# Patient Record
Sex: MALE | Race: AMERICAN INDIAN OR ALASKA NATIVE | NOT HISPANIC OR LATINO | Employment: UNEMPLOYED | URBAN - METROPOLITAN AREA
[De-identification: names, ages, dates, MRNs, and addresses within clinical notes are randomized per-mention and may not be internally consistent; named-entity substitution may affect disease eponyms.]

---

## 2017-10-06 ENCOUNTER — HOSPITAL ENCOUNTER (OUTPATIENT)
Facility: MEDICAL CENTER | Age: 47
End: 2017-10-06
Attending: FAMILY MEDICINE
Payer: COMMERCIAL

## 2017-10-06 LAB
ALBUMIN SERPL BCP-MCNC: 3.9 G/DL (ref 3.2–4.9)
ALBUMIN/GLOB SERPL: 1.2 G/DL
ALP SERPL-CCNC: 48 U/L (ref 30–99)
ALT SERPL-CCNC: 59 U/L (ref 2–50)
ANION GAP SERPL CALC-SCNC: 8 MMOL/L (ref 0–11.9)
AST SERPL-CCNC: 35 U/L (ref 12–45)
BASOPHILS # BLD AUTO: 0.7 % (ref 0–1.8)
BASOPHILS # BLD: 0.03 K/UL (ref 0–0.12)
BILIRUB SERPL-MCNC: 1.4 MG/DL (ref 0.1–1.5)
BUN SERPL-MCNC: 15 MG/DL (ref 8–22)
CALCIUM SERPL-MCNC: 9.4 MG/DL (ref 8.5–10.5)
CHLORIDE SERPL-SCNC: 103 MMOL/L (ref 96–112)
CHOLEST SERPL-MCNC: 141 MG/DL (ref 100–199)
CO2 SERPL-SCNC: 23 MMOL/L (ref 20–33)
CREAT SERPL-MCNC: 0.87 MG/DL (ref 0.5–1.4)
CREAT UR-MCNC: 222 MG/DL
EOSINOPHIL # BLD AUTO: 0.15 K/UL (ref 0–0.51)
EOSINOPHIL NFR BLD: 3.3 % (ref 0–6.9)
ERYTHROCYTE [DISTWIDTH] IN BLOOD BY AUTOMATED COUNT: 42.1 FL (ref 35.9–50)
EST. AVERAGE GLUCOSE BLD GHB EST-MCNC: 289 MG/DL
GFR SERPL CREATININE-BSD FRML MDRD: >60 ML/MIN/1.73 M 2
GLOBULIN SER CALC-MCNC: 3.3 G/DL (ref 1.9–3.5)
GLUCOSE SERPL-MCNC: 276 MG/DL (ref 65–99)
HBA1C MFR BLD: 11.7 % (ref 0–5.6)
HCT VFR BLD AUTO: 49.5 % (ref 42–52)
HDLC SERPL-MCNC: 34 MG/DL
HGB BLD-MCNC: 17 G/DL (ref 14–18)
IMM GRANULOCYTES # BLD AUTO: 0.01 K/UL (ref 0–0.11)
IMM GRANULOCYTES NFR BLD AUTO: 0.2 % (ref 0–0.9)
LDLC SERPL CALC-MCNC: 59 MG/DL
LYMPHOCYTES # BLD AUTO: 2.16 K/UL (ref 1–4.8)
LYMPHOCYTES NFR BLD: 47 % (ref 22–41)
MCH RBC QN AUTO: 31.5 PG (ref 27–33)
MCHC RBC AUTO-ENTMCNC: 34.3 G/DL (ref 33.7–35.3)
MCV RBC AUTO: 91.8 FL (ref 81.4–97.8)
MICROALBUMIN UR-MCNC: 2.7 MG/DL
MICROALBUMIN/CREAT UR: 12 MG/G (ref 0–30)
MONOCYTES # BLD AUTO: 0.33 K/UL (ref 0–0.85)
MONOCYTES NFR BLD AUTO: 7.2 % (ref 0–13.4)
NEUTROPHILS # BLD AUTO: 1.92 K/UL (ref 1.82–7.42)
NEUTROPHILS NFR BLD: 41.6 % (ref 44–72)
NRBC # BLD AUTO: 0 K/UL
NRBC BLD AUTO-RTO: 0 /100 WBC
PLATELET # BLD AUTO: 264 K/UL (ref 164–446)
PMV BLD AUTO: 11.4 FL (ref 9–12.9)
POTASSIUM SERPL-SCNC: 4.2 MMOL/L (ref 3.6–5.5)
PROT SERPL-MCNC: 7.2 G/DL (ref 6–8.2)
RBC # BLD AUTO: 5.39 M/UL (ref 4.7–6.1)
SODIUM SERPL-SCNC: 134 MMOL/L (ref 135–145)
TRIGL SERPL-MCNC: 242 MG/DL (ref 0–149)
WBC # BLD AUTO: 4.6 K/UL (ref 4.8–10.8)

## 2017-10-06 PROCEDURE — 82043 UR ALBUMIN QUANTITATIVE: CPT

## 2017-10-06 PROCEDURE — 80053 COMPREHEN METABOLIC PANEL: CPT

## 2017-10-06 PROCEDURE — 82570 ASSAY OF URINE CREATININE: CPT

## 2017-10-06 PROCEDURE — 83036 HEMOGLOBIN GLYCOSYLATED A1C: CPT

## 2017-10-06 PROCEDURE — 80061 LIPID PANEL: CPT

## 2017-10-06 PROCEDURE — 85025 COMPLETE CBC W/AUTO DIFF WBC: CPT

## 2018-01-29 ENCOUNTER — HOSPITAL ENCOUNTER (OUTPATIENT)
Dept: LAB | Facility: MEDICAL CENTER | Age: 48
End: 2018-01-29
Attending: FAMILY MEDICINE
Payer: COMMERCIAL

## 2018-01-29 LAB
ALBUMIN SERPL BCP-MCNC: 4.1 G/DL (ref 3.2–4.9)
ALBUMIN/GLOB SERPL: 1.6 G/DL
ALP SERPL-CCNC: 42 U/L (ref 30–99)
ALT SERPL-CCNC: 34 U/L (ref 2–50)
ANION GAP SERPL CALC-SCNC: 9 MMOL/L (ref 0–11.9)
AST SERPL-CCNC: 20 U/L (ref 12–45)
BASOPHILS # BLD AUTO: 0.5 % (ref 0–1.8)
BASOPHILS # BLD: 0.02 K/UL (ref 0–0.12)
BILIRUB SERPL-MCNC: 0.5 MG/DL (ref 0.1–1.5)
BUN SERPL-MCNC: 20 MG/DL (ref 8–22)
CALCIUM SERPL-MCNC: 10 MG/DL (ref 8.5–10.5)
CHLORIDE SERPL-SCNC: 99 MMOL/L (ref 96–112)
CO2 SERPL-SCNC: 25 MMOL/L (ref 20–33)
CREAT SERPL-MCNC: 0.85 MG/DL (ref 0.5–1.4)
EOSINOPHIL # BLD AUTO: 0.12 K/UL (ref 0–0.51)
EOSINOPHIL NFR BLD: 2.8 % (ref 0–6.9)
ERYTHROCYTE [DISTWIDTH] IN BLOOD BY AUTOMATED COUNT: 43.4 FL (ref 35.9–50)
GLOBULIN SER CALC-MCNC: 2.6 G/DL (ref 1.9–3.5)
GLUCOSE SERPL-MCNC: 338 MG/DL (ref 65–99)
HCT VFR BLD AUTO: 48.7 % (ref 42–52)
HGB BLD-MCNC: 16.7 G/DL (ref 14–18)
IMM GRANULOCYTES # BLD AUTO: 0.01 K/UL (ref 0–0.11)
IMM GRANULOCYTES NFR BLD AUTO: 0.2 % (ref 0–0.9)
LYMPHOCYTES # BLD AUTO: 2.18 K/UL (ref 1–4.8)
LYMPHOCYTES NFR BLD: 50.8 % (ref 22–41)
MCH RBC QN AUTO: 32.4 PG (ref 27–33)
MCHC RBC AUTO-ENTMCNC: 34.3 G/DL (ref 33.7–35.3)
MCV RBC AUTO: 94.4 FL (ref 81.4–97.8)
MONOCYTES # BLD AUTO: 0.31 K/UL (ref 0–0.85)
MONOCYTES NFR BLD AUTO: 7.2 % (ref 0–13.4)
NEUTROPHILS # BLD AUTO: 1.65 K/UL (ref 1.82–7.42)
NEUTROPHILS NFR BLD: 38.5 % (ref 44–72)
NRBC # BLD AUTO: 0 K/UL
NRBC BLD-RTO: 0 /100 WBC
PLATELET # BLD AUTO: 232 K/UL (ref 164–446)
PMV BLD AUTO: 11.4 FL (ref 9–12.9)
POTASSIUM SERPL-SCNC: 4.7 MMOL/L (ref 3.6–5.5)
PROT SERPL-MCNC: 6.7 G/DL (ref 6–8.2)
RBC # BLD AUTO: 5.16 M/UL (ref 4.7–6.1)
SODIUM SERPL-SCNC: 133 MMOL/L (ref 135–145)
WBC # BLD AUTO: 4.3 K/UL (ref 4.8–10.8)

## 2018-01-29 PROCEDURE — 83525 ASSAY OF INSULIN: CPT

## 2018-01-29 PROCEDURE — 83036 HEMOGLOBIN GLYCOSYLATED A1C: CPT

## 2018-01-29 PROCEDURE — 80053 COMPREHEN METABOLIC PANEL: CPT

## 2018-01-29 PROCEDURE — 85025 COMPLETE CBC W/AUTO DIFF WBC: CPT

## 2018-01-29 PROCEDURE — 84681 ASSAY OF C-PEPTIDE: CPT

## 2018-01-30 LAB
EST. AVERAGE GLUCOSE BLD GHB EST-MCNC: 332 MG/DL
HBA1C MFR BLD: 13.2 % (ref 0–5.6)

## 2018-01-31 LAB
C PEPTIDE SERPL-MCNC: 2.5 NG/ML (ref 0.8–3.5)
INSULIN P FAST SERPL-ACNC: 9 UIU/ML (ref 3–19)

## 2018-04-05 ENCOUNTER — PATIENT OUTREACH (OUTPATIENT)
Dept: HEALTH INFORMATION MANAGEMENT | Facility: OTHER | Age: 48
End: 2018-04-05

## 2018-04-05 NOTE — PROGRESS NOTES
Attempt #:FINAL    WebIZ Checked & Epic Updated: no  HealthConnect Verified: no  Verify PCP: yes    Communication Preference Obtained: yes     Review Care Team: yes         Care Gap Scheduling (Attempt to Schedule EACH Overdue Care Gap!)  Health Maintenance Due   Topic Date Due   • IMM DTaP/Tdap/Td Vaccine (1 - Tdap) 08/10/1991           MyChart Activation: sent activation code

## 2018-05-29 ENCOUNTER — TELEPHONE (OUTPATIENT)
Dept: MEDICAL GROUP | Facility: LAB | Age: 48
End: 2018-05-29

## 2018-05-29 DIAGNOSIS — M25.569 KNEE PAIN, UNSPECIFIED CHRONICITY, UNSPECIFIED LATERALITY: ICD-10-CM

## 2018-05-29 DIAGNOSIS — M25.519 SHOULDER PAIN, UNSPECIFIED CHRONICITY, UNSPECIFIED LATERALITY: ICD-10-CM

## 2018-05-29 NOTE — TELEPHONE ENCOUNTER
1. Caller Name: Myron                                       Call Back Number: [phone]      Patient approves a detailed voicemail message: yes    Pt called states he was told you will be his new PCP since the county changed his insurance. They no longer accept his old PCP. Pt is requiring a referral to ortho for shoulder and knee pain. He stated he is in [place] til the end of July and Ortho can get him in next week. Shoulder has popping and sharp pain with light weight. Knee pain is caused due to flat feet. Pt states she wears inserts in his shoes.   [facility] Attn- Alejandra Bloom Fax [phone]  Please advise

## 2018-07-03 ENCOUNTER — OFFICE VISIT (OUTPATIENT)
Dept: MEDICAL GROUP | Facility: MEDICAL CENTER | Age: 48
End: 2018-07-03
Payer: COMMERCIAL

## 2018-07-03 VITALS
RESPIRATION RATE: 16 BRPM | OXYGEN SATURATION: 95 % | HEART RATE: 68 BPM | DIASTOLIC BLOOD PRESSURE: 90 MMHG | BODY MASS INDEX: 32.28 KG/M2 | SYSTOLIC BLOOD PRESSURE: 122 MMHG | HEIGHT: 71 IN | TEMPERATURE: 97.4 F | WEIGHT: 230.6 LBS

## 2018-07-03 DIAGNOSIS — I10 ESSENTIAL HYPERTENSION: ICD-10-CM

## 2018-07-03 DIAGNOSIS — Z76.89 ESTABLISHING CARE WITH NEW DOCTOR, ENCOUNTER FOR: ICD-10-CM

## 2018-07-03 DIAGNOSIS — G89.29 CHRONIC RIGHT SHOULDER PAIN: ICD-10-CM

## 2018-07-03 DIAGNOSIS — M25.562 KNEE PAIN, LEFT ANTERIOR: ICD-10-CM

## 2018-07-03 DIAGNOSIS — E78.00 PURE HYPERCHOLESTEROLEMIA: ICD-10-CM

## 2018-07-03 DIAGNOSIS — E11.9 CONTROLLED TYPE 2 DIABETES MELLITUS WITHOUT COMPLICATION, WITHOUT LONG-TERM CURRENT USE OF INSULIN (HCC): ICD-10-CM

## 2018-07-03 DIAGNOSIS — M25.511 CHRONIC RIGHT SHOULDER PAIN: ICD-10-CM

## 2018-07-03 DIAGNOSIS — E66.9 OBESITY (BMI 30-39.9): ICD-10-CM

## 2018-07-03 PROCEDURE — 99202 OFFICE O/P NEW SF 15 MIN: CPT | Performed by: INTERNAL MEDICINE

## 2018-07-03 RX ORDER — LOVASTATIN 20 MG/1
TABLET ORAL
COMMUNITY
End: 2020-01-13

## 2018-07-03 RX ORDER — GLIPIZIDE 10 MG/1
TABLET ORAL
COMMUNITY
End: 2018-07-03

## 2018-07-03 RX ORDER — LOSARTAN POTASSIUM AND HYDROCHLOROTHIAZIDE 25; 100 MG/1; MG/1
TABLET ORAL
COMMUNITY
End: 2020-01-13

## 2018-07-03 RX ORDER — SAXAGLIPTIN 5 MG/1
TABLET, FILM COATED ORAL DAILY
COMMUNITY
End: 2018-07-03

## 2018-07-03 RX ORDER — AMLODIPINE AND BENAZEPRIL HYDROCHLORIDE 10; 40 MG/1; MG/1
CAPSULE ORAL
COMMUNITY
End: 2018-07-03

## 2018-07-03 RX ORDER — GLIPIZIDE 10 MG/1
10 TABLET ORAL 2 TIMES DAILY
COMMUNITY
End: 2018-10-09

## 2018-07-03 ASSESSMENT — PATIENT HEALTH QUESTIONNAIRE - PHQ9: CLINICAL INTERPRETATION OF PHQ2 SCORE: 0

## 2018-07-03 NOTE — LETTER
July 3, 2018        Myron Lilly        To Whom It May Concern:    I evaluated Myron in my office today, he is going to be worked up for possible torn rotator cuff and for right knee pain. He will be unable to perform his usual work duties for an indeterminate amount of time, depending on the outcome of his evaluation. We will have more information following his MRIs and orthopedic consultation.            Sincerely,      Nayeli Adam MD

## 2018-07-03 NOTE — PROGRESS NOTES
Chief Complaint   Patient presents with   • Establish Care   • Shoulder Pain     bilat, more pain with R side x 1 year, need referral to ortho   • Knee Pain     L       HISTORY OF PRESENT ILLNESS: Patient is a 47 y.o. male patient who presents today to discuss the evaluation and management of:          1. Establishing care with new doctor, encounter for    Previously followed by Dr. Fall, not seen for about 6 months. 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     2. Chronic right shoulder pain    Patient is complaining of increasing right shoulder pain over the last several years, now it is at the point that he is unable to use his right arm. He has very limited range of motion and has difficulty even  putting it on the steering wheel to drive.  He was seen by an orthopedist, and was to have an MRI but his insurance changed so it was not done.    3. Knee pain, left anterior    Patient is complaining of knee pain, he was told by his previous fourth toe that it was bone on bone. Again, he was to have an MRI but it was not done. He is unable to do any sort of weightbearing exercise due to pain'      4. Controlled type 2 diabetes mellitus without complication, without long-term current use of insulin (HCC)    Diagnosed about one year ago, she is on multiple medications including glipizide 10 mg twice a day, metformin 1000 mg twice a day, Januvia, and Jardiance.. He is due for fasting blood work. He does check his finger sticks occasionally, when last checked it was 111.    5. Essential hypertension    Currently taking losartan/hydrochlorothiazide 100/25 daily. Diastolic pressure is minimally elevated at 90 today.    6. Pure hypercholesterolemia    On Mevacor 20 mg daily.    7. Obesity (BMI 30-39.9)    Patient has gained at least 10 pounds recently due to his inability to exercise from his knee and shoulder pain. He is unable to swim or do any weightbearing  "exercises.        Patient Active Problem List    Diagnosis Date Noted   • Elevated transaminase level 02/22/2015     Priority: High   • Chronic right shoulder pain 07/03/2018   • Knee pain, left anterior 07/03/2018   • Controlled type 2 diabetes mellitus without complication, without long-term current use of insulin (HCC) 07/03/2018   • Essential hypertension 07/03/2018   • Pure hypercholesterolemia 07/03/2018   • Obesity (BMI 30-39.9) 07/03/2018        Allergies:Patient has no known allergies.    Current meds including changes today  Current Outpatient Prescriptions   Medication Sig Dispense Refill   • SITagliptin (JANUVIA) 100 MG Tab Januvia 100 mg tablet     • Empagliflozin (JARDIANCE) 25 MG Tab Jardiance 25 mg tablet     • losartan-hydrochlorothiazide (HYZAAR) 100-25 MG per tablet losartan 100 mg-hydrochlorothiazide 25 mg tablet     • lovastatin (MEVACOR) 20 MG Tab lovastatin 20 mg tablet     • metformin (GLUCOPHAGE) 1000 MG tablet metformin 1,000 mg tablet     • Empagliflozin 25 MG Tab Take 25 mg by mouth every day.     • glipiZIDE (GLUCOTROL) 10 MG Tab Take 10 mg by mouth 2 times a day.       No current facility-administered medications for this visit.      Social History   Substance Use Topics   • Smoking status: Never Smoker   • Smokeless tobacco: Former User   • Alcohol use Yes      Comment: rare     Social History     Social History Narrative   • No narrative on file       Family History   Problem Relation Age of Onset   • Diabetes Mother    • Diabetes Father    • Hypertension Father    • Diabetes Brother        Review of Systems:  No chest pain, No shortness of breath, No dyspnea on exertion  Gastrointestinal ROS: No abdominal pain, No nausea, vomiting, diarrhea, or constipation        Exam:      Blood pressure 122/90, pulse 68, temperature 36.3 °C (97.4 °F), resp. rate 16, height 1.803 m (5' 11\"), weight 104.6 kg (230 lb 9.6 oz), SpO2 95 %.  General:  Well nourished, well developed male in NAD affect and " mood within normal limits  Head is grossly normal.  Neck: Supple without adenopathy  Pulmonary: Clear to ausculation.  Normal effort. No rales, rhonchi, or wheezing.  Cardiovascular: Regular rate and rhythm without murmur.   Extremities: no clubbing, cyanosis, or edema. Very limited range of motion right shoulder due to pain. Tenderness of biceps tendon insertion. Right knee unable to fully extend      Please note that this dictation was created using voice recognition software. I have made every reasonable attempt to correct obvious errors, but I expect that there are errors of grammar and possibly content that I did not discover before finalizing the note.    Assessment/Plan:  1. Establishing care with new doctor, encounter for        2. Chronic right shoulder pain      - MR-SHOULDER-W/O RIGHT; Future  - REFERRAL TO ORTHOPEDICS    3. Knee pain, left anterior      - MR-KNEE-W/O RIGHT; Future  - REFERRAL TO ORTHOPEDICS    4. Controlled type 2 diabetes mellitus without complication, without long-term current use of insulin (HCC)    -Stable, continue current medications  - HEMOGLOBIN A1C; Future    5. Essential hypertension    Stable, continue Hyzaar. Consider adding calcium channel blocker if BP remains high  - COMP METABOLIC PANEL; Future    6. Pure hypercholesterolemia    Stable, continue lovastatin  - LIPID PROFILE; Future    7. Obesity (BMI 30-39.9)      - Patient identified as having weight management issue.  Appropriate orders and counseling given.    Followup: Return in about 3 months (around 10/3/2018).

## 2018-07-16 ENCOUNTER — HOSPITAL ENCOUNTER (OUTPATIENT)
Dept: RADIOLOGY | Facility: MEDICAL CENTER | Age: 48
End: 2018-07-16
Attending: INTERNAL MEDICINE
Payer: COMMERCIAL

## 2018-07-16 DIAGNOSIS — G89.29 CHRONIC RIGHT SHOULDER PAIN: ICD-10-CM

## 2018-07-16 DIAGNOSIS — M25.562 KNEE PAIN, LEFT ANTERIOR: ICD-10-CM

## 2018-07-16 DIAGNOSIS — M25.511 CHRONIC RIGHT SHOULDER PAIN: ICD-10-CM

## 2018-07-16 PROCEDURE — 73221 MRI JOINT UPR EXTREM W/O DYE: CPT | Mod: RT

## 2018-07-16 PROCEDURE — 73721 MRI JNT OF LWR EXTRE W/O DYE: CPT | Mod: RT

## 2018-07-17 ENCOUNTER — TELEPHONE (OUTPATIENT)
Dept: MEDICAL GROUP | Facility: MEDICAL CENTER | Age: 48
End: 2018-07-17

## 2018-07-17 NOTE — TELEPHONE ENCOUNTER
Phone Number Called: [phone] (home)     Message: Left msg to return call.    Left Message for patient to call back: yes

## 2018-07-17 NOTE — TELEPHONE ENCOUNTER
----- Message from Nayeli Adam M.D. sent at 7/17/2018  2:50 PM PDT -----  Please advise pt his MRI of the shoulder and knee did not show any major tears, there were some minor abnormalities he can discuss with the orthopedic surgeon.   Nayeli Adam M.D. covering for Nayeli Adam M.D.

## 2018-10-02 ENCOUNTER — OFFICE VISIT (OUTPATIENT)
Dept: MEDICAL GROUP | Facility: MEDICAL CENTER | Age: 48
End: 2018-10-02
Payer: COMMERCIAL

## 2018-10-02 VITALS
HEIGHT: 71 IN | RESPIRATION RATE: 16 BRPM | HEART RATE: 84 BPM | DIASTOLIC BLOOD PRESSURE: 90 MMHG | BODY MASS INDEX: 33.46 KG/M2 | TEMPERATURE: 97.1 F | SYSTOLIC BLOOD PRESSURE: 140 MMHG | OXYGEN SATURATION: 95 % | WEIGHT: 239 LBS

## 2018-10-02 DIAGNOSIS — Z23 NEED FOR VACCINATION: ICD-10-CM

## 2018-10-02 DIAGNOSIS — M79.652 THIGH PAIN, MUSCULOSKELETAL, LEFT: ICD-10-CM

## 2018-10-02 PROCEDURE — 90686 IIV4 VACC NO PRSV 0.5 ML IM: CPT | Performed by: INTERNAL MEDICINE

## 2018-10-02 PROCEDURE — 90471 IMMUNIZATION ADMIN: CPT | Performed by: INTERNAL MEDICINE

## 2018-10-02 PROCEDURE — 99213 OFFICE O/P EST LOW 20 MIN: CPT | Mod: 25 | Performed by: INTERNAL MEDICINE

## 2018-10-02 NOTE — LETTER
October 2, 2018        Myron Sanches was seen in my office today for evaluation of left upper thigh pain and swelling.  It is my opinion that he has a partially torn or completely torn left quadriceps muscle.  He should be off of work until the results of his evaluation including MRI are complete.  This should be done on or before October 10, 2018.      Sincerely,          Nayeli Adam MD

## 2018-10-03 ENCOUNTER — PATIENT MESSAGE (OUTPATIENT)
Dept: MEDICAL GROUP | Facility: MEDICAL CENTER | Age: 48
End: 2018-10-03

## 2018-10-03 ENCOUNTER — HOSPITAL ENCOUNTER (OUTPATIENT)
Dept: RADIOLOGY | Facility: MEDICAL CENTER | Age: 48
End: 2018-10-03
Attending: INTERNAL MEDICINE
Payer: COMMERCIAL

## 2018-10-03 DIAGNOSIS — M79.652 THIGH PAIN, MUSCULOSKELETAL, LEFT: ICD-10-CM

## 2018-10-03 DIAGNOSIS — S76.112S QUADRICEPS TENDON RUPTURE, LEFT, SEQUELA: ICD-10-CM

## 2018-10-03 PROCEDURE — 73718 MRI LOWER EXTREMITY W/O DYE: CPT | Mod: LT

## 2018-10-03 NOTE — PROGRESS NOTES
Chief Complaint   Patient presents with   • Muscle Pain     L quad,possible torn 2.5 weeks, had problem 2 years ago     Chief complaint: Left upper thigh pain    HISTORY OF PRESENT ILLNESS: Patient is a 48 y.o. male patient who presents today to discuss the evaluation and management of:          1. Thigh pain, musculoskeletal, left    Patient is complaining of pain in his left upper thigh since September 14, 2018.  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  Since then he has had pain and weakness, he is unable to climb stairs, stand up from a sitting position, and has had a mass in his upper thigh.  He was seen by Workmen's Comp. physician that day and was told he likely had a torn quadriceps muscle/tendon, however that he needed to go to his PCP for imaging.    Patient states he had a very similar injury about 2-1/2 years ago when he was chasing a suspect.  He had a sudden severe pain and heard a large pop or crack.  He was unable to walk or run for about 6 weeks, he had severe swelling and pain similar to what he has now.  He never had imaging or treatment, and it gradually resolved.    2. Need for vaccination    Due for flu shot        Patient Active Problem List    Diagnosis Date Noted   • Elevated transaminase level 02/22/2015     Priority: High   • Thigh pain, musculoskeletal, left 10/02/2018   • Chronic right shoulder pain 07/03/2018   • Knee pain, left anterior 07/03/2018   • Controlled type 2 diabetes mellitus without complication, without long-term current use of insulin (HCC) 07/03/2018   • Essential hypertension 07/03/2018   • Pure hypercholesterolemia 07/03/2018   • Obesity (BMI 30-39.9) 07/03/2018        Allergies:Patient has no known allergies.    Current meds including changes today  Current Outpatient Prescriptions   Medication Sig Dispense Refill   • SITagliptin (JANUVIA) 100 MG Tab Januvia  "100 mg tablet     • Empagliflozin (JARDIANCE) 25 MG Tab Jardiance 25 mg tablet     • losartan-hydrochlorothiazide (HYZAAR) 100-25 MG per tablet losartan 100 mg-hydrochlorothiazide 25 mg tablet     • lovastatin (MEVACOR) 20 MG Tab lovastatin 20 mg tablet     • metformin (GLUCOPHAGE) 1000 MG tablet metformin 1,000 mg tablet     • Empagliflozin 25 MG Tab Take 25 mg by mouth every day.     • glipiZIDE (GLUCOTROL) 10 MG Tab Take 10 mg by mouth 2 times a day.       No current facility-administered medications for this visit.      Social History   Substance Use Topics   • Smoking status: Never Smoker   • Smokeless tobacco: Former User   • Alcohol use Yes      Comment: rare     Social History     Social History Narrative   • No narrative on file       Family History   Problem Relation Age of Onset   • Diabetes Mother    • Diabetes Father    • Hypertension Father    • Diabetes Brother            Exam:      Blood pressure 140/90, pulse 84, temperature 36.2 °C (97.1 °F), temperature source Temporal, resp. rate 16, height 1.803 m (5' 11\"), weight 108.4 kg (239 lb), SpO2 95 %.  General:  Well nourished, well developed male in NAD affect and mood within normal limits  Head is grossly normal.  Extremities: no clubbing, cyanosis, or edema.  Left upper thigh with lemon-sized swelling, he is unable to lift left leg off the exam table due to pain and weakness  Neuro: moves all extremities symmetrically    Please note that this dictation was created using voice recognition software. I have made every reasonable attempt to correct obvious errors, but I expect that there are errors of grammar and possibly content that I did not discover before finalizing the note.    Assessment/Plan:  1. Thigh pain, musculoskeletal, left    -Clinically, patient has a partial or complete tear of his quadriceps tendon.  He requires imaging to document this for purposes of his Workmen's Comp.  Following this we will refer to orthopedics for further " evaluation and treatment.  - MR-FEMUR-W/O LEFT; Future    2. Need for vaccination      - Flu Quad Inj >3 Year Pre-Filled PF    Patient has multiple other medical problems which are poorly managed, including hypertension and diabetes.  He has an existing appointment in 1 week to follow-up regarding these.  He is going to have his previously ordered blood work done prior to the appointment.      Followup: No Follow-up on file.

## 2018-10-04 NOTE — TELEPHONE ENCOUNTER
From: Myron Lilly  To: Nayeli Adam M.D.  Sent: 10/3/2018 6:41 PM PDT  Subject: Test Result Question    I would like to continue to go through my insurance and I can figure out the reimbursement later.    Do I make that appointment? I can’t remeber how it worked last time.    Thank you for getting my results back to me so quickly.

## 2018-10-06 ENCOUNTER — HOSPITAL ENCOUNTER (OUTPATIENT)
Dept: LAB | Facility: MEDICAL CENTER | Age: 48
End: 2018-10-06
Attending: INTERNAL MEDICINE
Payer: COMMERCIAL

## 2018-10-06 DIAGNOSIS — E11.9 CONTROLLED TYPE 2 DIABETES MELLITUS WITHOUT COMPLICATION, WITHOUT LONG-TERM CURRENT USE OF INSULIN (HCC): ICD-10-CM

## 2018-10-06 DIAGNOSIS — E78.00 PURE HYPERCHOLESTEROLEMIA: ICD-10-CM

## 2018-10-06 DIAGNOSIS — I10 ESSENTIAL HYPERTENSION: ICD-10-CM

## 2018-10-06 LAB
ALBUMIN SERPL BCP-MCNC: 4.2 G/DL (ref 3.2–4.9)
ALBUMIN/GLOB SERPL: 1.6 G/DL
ALP SERPL-CCNC: 27 U/L (ref 30–99)
ALT SERPL-CCNC: 38 U/L (ref 2–50)
ANION GAP SERPL CALC-SCNC: 7 MMOL/L (ref 0–11.9)
AST SERPL-CCNC: 30 U/L (ref 12–45)
BILIRUB SERPL-MCNC: 1.4 MG/DL (ref 0.1–1.5)
BUN SERPL-MCNC: 19 MG/DL (ref 8–22)
CALCIUM SERPL-MCNC: 9.6 MG/DL (ref 8.5–10.5)
CHLORIDE SERPL-SCNC: 103 MMOL/L (ref 96–112)
CHOLEST SERPL-MCNC: 110 MG/DL (ref 100–199)
CO2 SERPL-SCNC: 30 MMOL/L (ref 20–33)
CREAT SERPL-MCNC: 1.08 MG/DL (ref 0.5–1.4)
EST. AVERAGE GLUCOSE BLD GHB EST-MCNC: 174 MG/DL
GLOBULIN SER CALC-MCNC: 2.7 G/DL (ref 1.9–3.5)
GLUCOSE SERPL-MCNC: 137 MG/DL (ref 65–99)
HBA1C MFR BLD: 7.7 % (ref 0–5.6)
HDLC SERPL-MCNC: 33 MG/DL
LDLC SERPL CALC-MCNC: 55 MG/DL
POTASSIUM SERPL-SCNC: 3.6 MMOL/L (ref 3.6–5.5)
PROT SERPL-MCNC: 6.9 G/DL (ref 6–8.2)
SODIUM SERPL-SCNC: 140 MMOL/L (ref 135–145)
TRIGL SERPL-MCNC: 111 MG/DL (ref 0–149)

## 2018-10-06 PROCEDURE — 36415 COLL VENOUS BLD VENIPUNCTURE: CPT

## 2018-10-06 PROCEDURE — 80053 COMPREHEN METABOLIC PANEL: CPT

## 2018-10-06 PROCEDURE — 80061 LIPID PANEL: CPT

## 2018-10-06 PROCEDURE — 83036 HEMOGLOBIN GLYCOSYLATED A1C: CPT

## 2018-10-09 ENCOUNTER — OFFICE VISIT (OUTPATIENT)
Dept: MEDICAL GROUP | Facility: MEDICAL CENTER | Age: 48
End: 2018-10-09
Payer: COMMERCIAL

## 2018-10-09 VITALS
OXYGEN SATURATION: 94 % | TEMPERATURE: 97.9 F | DIASTOLIC BLOOD PRESSURE: 82 MMHG | HEIGHT: 71 IN | SYSTOLIC BLOOD PRESSURE: 110 MMHG | RESPIRATION RATE: 16 BRPM | HEART RATE: 80 BPM | BODY MASS INDEX: 33.65 KG/M2 | WEIGHT: 240.4 LBS

## 2018-10-09 DIAGNOSIS — E11.9 CONTROLLED TYPE 2 DIABETES MELLITUS WITHOUT COMPLICATION, WITHOUT LONG-TERM CURRENT USE OF INSULIN (HCC): ICD-10-CM

## 2018-10-09 DIAGNOSIS — E78.00 PURE HYPERCHOLESTEROLEMIA: ICD-10-CM

## 2018-10-09 DIAGNOSIS — E66.9 OBESITY (BMI 30-39.9): ICD-10-CM

## 2018-10-09 DIAGNOSIS — S76.112D QUADRICEPS MUSCLE RUPTURE, LEFT, SUBSEQUENT ENCOUNTER: ICD-10-CM

## 2018-10-09 PROBLEM — M25.562 KNEE PAIN, LEFT ANTERIOR: Status: RESOLVED | Noted: 2018-07-03 | Resolved: 2018-10-09

## 2018-10-09 PROBLEM — M79.652 THIGH PAIN, MUSCULOSKELETAL, LEFT: Status: RESOLVED | Noted: 2018-10-02 | Resolved: 2018-10-09

## 2018-10-09 PROCEDURE — 99214 OFFICE O/P EST MOD 30 MIN: CPT | Performed by: INTERNAL MEDICINE

## 2018-10-09 RX ORDER — GLIPIZIDE 10 MG/1
10 TABLET ORAL DAILY
COMMUNITY
End: 2020-01-13

## 2018-10-09 NOTE — PROGRESS NOTES
Chief Complaint   Patient presents with   • Results     labs     Chief complaint: 3-month follow-up of diabetes and high cholesterol.    HISTORY OF PRESENT ILLNESS: Patient is a 48 y.o. male patient who presents today to discuss the evaluation and management of:          1. Quadriceps muscle rupture, left, subsequent encounter    I saw patient last week for left thigh pain, patient had an MRI which showed a torn left rectus femoris quadriceps tendon.  He is scheduled with orthopedics in 2 days.  He is probably going to require extensive surgery for repair.    2. Controlled type 2 diabetes mellitus without complication, without long-term current use of insulin (HCC)    Patient was diagnosed in 2016, he has had historically very poor control.  He currently is taking 4 medications, including metformin 1000 mg every morning, glipizide 10 mg every morning, Januvia 100 mg every morning, and Jardiance 25 mg every morning.  With this his blood sugar is much better controlled.  Recent hemoglobin A1c was 7.7 with fasting blood sugar 137.    3. Pure hypercholesterolemia      Results for ANNMARIE FONTANA (MRN [id number]) as of 10/9/2018 09:38   Ref. Range 10/6/2018 08:46   Cholesterol,Tot Latest Ref Range: 100 - 199 mg/dL 110   Triglycerides Latest Ref Range: 0 - 149 mg/dL 111   HDL Latest Ref Range: >=40 mg/dL 33 (A)   LDL Latest Ref Range: <100 mg/dL 55     On Mevacor 20 mg daily.    4. Obesity (BMI 30-39.9)    Unfortunately, patient has gained 10 pounds in the last 3 months.  He attributes this to inactivity.  In addition to his torn quadriceps muscle, he has chronic knee pain and left shoulder pain.  He has been undergoing physical therapy for his shoulder and it is actually doing better.        Patient Active Problem List    Diagnosis Date Noted   • Quadriceps muscle rupture, left, subsequent encounter 10/09/2018   • Chronic right shoulder pain 07/03/2018   • Controlled type 2 diabetes mellitus without complication, without  "long-term current use of insulin (HCC) 07/03/2018   • Essential hypertension 07/03/2018   • Pure hypercholesterolemia 07/03/2018   • Obesity (BMI 30-39.9) 07/03/2018        Allergies:Patient has no known allergies.    Current meds including changes today  Current Outpatient Prescriptions   Medication Sig Dispense Refill   • glipiZIDE (GLUCOTROL) 10 MG Tab Take 10 mg by mouth every day.     • SITagliptin (JANUVIA) 100 MG Tab Januvia 100 mg tablet     • Empagliflozin (JARDIANCE) 25 MG Tab Jardiance 25 mg tablet     • losartan-hydrochlorothiazide (HYZAAR) 100-25 MG per tablet losartan 100 mg-hydrochlorothiazide 25 mg tablet     • lovastatin (MEVACOR) 20 MG Tab lovastatin 20 mg tablet     • metformin (GLUCOPHAGE) 1000 MG tablet metformin 1,000 mg tablet       No current facility-administered medications for this visit.      Social History   Substance Use Topics   • Smoking status: Never Smoker   • Smokeless tobacco: Former User   • Alcohol use Yes      Comment: rare     Social History     Social History Narrative   • No narrative on file       Family History   Problem Relation Age of Onset   • Diabetes Mother    • Diabetes Father    • Hypertension Father    • Diabetes Brother        Review of Systems:  No chest pain, No shortness of breath, No dyspnea on exertion  Gastrointestinal ROS: No abdominal pain, No nausea, vomiting, diarrhea, or constipation        Exam:      Blood pressure 110/82, pulse 80, temperature 36.6 °C (97.9 °F), temperature source Temporal, resp. rate 16, height 1.803 m (5' 11\"), weight 109 kg (240 lb 6.4 oz), SpO2 94 %.  General:  Well nourished, well developed male in NAD affect and mood within normal limits  Head is grossly normal.  Neck: Supple without adenopathy  Pulmonary: Clear to ausculation.  Normal effort. No rales, rhonchi, or wheezing.  Cardiovascular: Regular rate and rhythm without murmur.   Extremities: no clubbing, cyanosis, or edema.  Neuro: moves all extremities " symmetrically    Please note that this dictation was created using voice recognition software. I have made every reasonable attempt to correct obvious errors, but I expect that there are errors of grammar and possibly content that I did not discover before finalizing the note.    Assessment/Plan:  1. Quadriceps muscle rupture, left, subsequent encounter    -Has orthopedic follow-up scheduled in 2 days.  Letter written, see media excusing him from work until then    2. Controlled type 2 diabetes mellitus without complication, without long-term current use of insulin (HCC)      -Patient believes he had hepatitis B vaccine when he was in the  many years ago.  -Reasonable control, continue current medications.  At some point when he is able to exercise and lose weight again, he may be able to drop 1 of his medications.  - HEMOGLOBIN A1C; Future  - BASIC METABOLIC PANEL; Future  - MICROALBUMIN CREAT RATIO URINE; Future    3. Pure hypercholesterolemia    Stable, continue Mevacor    4. Obesity (BMI 30-39.9)        Followup: Return in about 6 months (around 4/9/2019).   Patient will have labs done prior to his follow-up in 6 months.

## 2018-10-09 NOTE — LETTER
October 9, 2018        Myron Espinozaos      To whom it may concern:    Myron was seen in my office today.  He was recently diagnosed with a torn rectus femoris (quadriceps) tendon rupture.  He will need to be off from work through October 11, 2018, when he has an orthopedic appointment for further evaluation and treatment.        Sincerely,      Nayeli Adam MD

## 2018-10-18 ENCOUNTER — HOSPITAL ENCOUNTER (OUTPATIENT)
Dept: RADIOLOGY | Facility: MEDICAL CENTER | Age: 48
End: 2018-10-18
Attending: ORTHOPAEDIC SURGERY
Payer: COMMERCIAL

## 2018-10-18 DIAGNOSIS — S76.012A STRAIN OF HIP AND THIGH, LEFT, INITIAL ENCOUNTER: ICD-10-CM

## 2018-10-18 DIAGNOSIS — S76.912A STRAIN OF HIP AND THIGH, LEFT, INITIAL ENCOUNTER: ICD-10-CM

## 2018-10-18 PROCEDURE — 73721 MRI JNT OF LWR EXTRE W/O DYE: CPT | Mod: LT

## 2018-10-25 ENCOUNTER — PATIENT MESSAGE (OUTPATIENT)
Dept: MEDICAL GROUP | Facility: MEDICAL CENTER | Age: 48
End: 2018-10-25

## 2018-10-30 ENCOUNTER — OFFICE VISIT (OUTPATIENT)
Dept: MEDICAL GROUP | Facility: MEDICAL CENTER | Age: 48
End: 2018-10-30
Payer: COMMERCIAL

## 2018-10-30 VITALS
RESPIRATION RATE: 16 BRPM | HEIGHT: 71 IN | TEMPERATURE: 97.9 F | SYSTOLIC BLOOD PRESSURE: 138 MMHG | BODY MASS INDEX: 33.88 KG/M2 | WEIGHT: 242 LBS | OXYGEN SATURATION: 93 % | HEART RATE: 84 BPM | DIASTOLIC BLOOD PRESSURE: 92 MMHG

## 2018-10-30 DIAGNOSIS — S76.112D QUADRICEPS MUSCLE RUPTURE, LEFT, SUBSEQUENT ENCOUNTER: ICD-10-CM

## 2018-10-30 DIAGNOSIS — M62.559 ATROPHY OF QUADRICEPS FEMORIS MUSCLE: ICD-10-CM

## 2018-10-30 PROCEDURE — 99213 OFFICE O/P EST LOW 20 MIN: CPT | Performed by: INTERNAL MEDICINE

## 2018-10-30 NOTE — PROGRESS NOTES
Chief Complaint   Patient presents with   • Muscle Pain     L, FMLA     Chief complaint: Torn left quadriceps muscle  HISTORY OF PRESENT ILLNESS: Patient is a 48 y.o. male patient who presents today to discuss the evaluation and management of:          1. Quadriceps muscle rupture, left, subsequent encounter    Myron is here today to follow-up on his torn left quadriceps muscle.  He saw the orthopedic surgeon, who advised him that because the tendon is intact at both his patella and femur, there is no surgical correction that can be done.  The problem is in the belly of his muscle, that he has shredded/torn the fibers there.  At this time, the only intervention would be physical therapy to try to build up the other 3 muscles of the quadriceps.    2. Atrophy of quadriceps femoris muscle    Physical therapist told patient that he has significant atrophy of his other 3 quadriceps muscles.  This is resulted in instability in his kneecap.  Patient is a , and is required to be able to run, stand for long periods.  He is requesting FMLA for 6 weeks while he is undergoing PT.        Patient Active Problem List    Diagnosis Date Noted   • Atrophy of quadriceps femoris muscle 10/30/2018   • Quadriceps muscle rupture, left, subsequent encounter 10/09/2018   • Chronic right shoulder pain 07/03/2018   • Controlled type 2 diabetes mellitus without complication, without long-term current use of insulin ([facility]) 07/03/2018   • Essential hypertension 07/03/2018   • Pure hypercholesterolemia 07/03/2018   • Obesity (BMI 30-39.9) 07/03/2018        Allergies:Patient has no known allergies.    Current meds including changes today  Current Outpatient Prescriptions   Medication Sig Dispense Refill   • glipiZIDE (GLUCOTROL) 10 MG Tab Take 10 mg by mouth every day.     • SITagliptin (JANUVIA) 100 MG Tab Januvia 100 mg tablet     • Empagliflozin (JARDIANCE) 25 MG Tab Jardiance 25 mg tablet     • losartan-hydrochlorothiazide (HYZAAR)  "100-25 MG per tablet losartan 100 mg-hydrochlorothiazide 25 mg tablet     • lovastatin (MEVACOR) 20 MG Tab lovastatin 20 mg tablet     • metformin (GLUCOPHAGE) 1000 MG tablet metformin 1,000 mg tablet       No current facility-administered medications for this visit.      Social History   Substance Use Topics   • Smoking status: Never Smoker   • Smokeless tobacco: Former User   • Alcohol use Yes      Comment: rare     Social History     Social History Narrative   • No narrative on file       Family History   Problem Relation Age of Onset   • Diabetes Mother    • Diabetes Father    • Hypertension Father    • Diabetes Brother              Exam:      Blood pressure 138/92, pulse 84, temperature 36.6 °C (97.9 °F), temperature source Temporal, resp. rate 16, height 1.803 m (5' 11\"), weight 109.8 kg (242 lb), SpO2 93 %.  General:  Well nourished, well developed male in NAD affect and mood within normal limits  Head is grossly normal.  Extremities: no clubbing, cyanosis, or edema.  Firm mass upper thigh unchanged  Neuro: moves all extremities symmetrically    Please note that this dictation was created using voice recognition software. I have made every reasonable attempt to correct obvious errors, but I expect that there are errors of grammar and possibly content that I did not discover before finalizing the note.    Assessment/Plan:  1. Quadriceps muscle rupture, left, subsequent encounter    [facility] paperwork was filled out, with return to work date set as December 3, 2018.  Patient will let me know if he requires an extension of this.    2. Atrophy of quadriceps femoris muscle        Followup: No Follow-up on file.        "

## 2018-10-31 DIAGNOSIS — Z01.812 PRE-OPERATIVE LABORATORY EXAMINATION: ICD-10-CM

## 2018-10-31 DIAGNOSIS — Z01.810 PRE-OPERATIVE CARDIOVASCULAR EXAMINATION: ICD-10-CM

## 2018-10-31 LAB
ERYTHROCYTE [DISTWIDTH] IN BLOOD BY AUTOMATED COUNT: 41.7 FL (ref 35.9–50)
HCT VFR BLD AUTO: 51.6 % (ref 42–52)
HGB BLD-MCNC: 18.3 G/DL (ref 14–18)
MCH RBC QN AUTO: 32.1 PG (ref 27–33)
MCHC RBC AUTO-ENTMCNC: 35.5 G/DL (ref 33.7–35.3)
MCV RBC AUTO: 90.5 FL (ref 81.4–97.8)
PLATELET # BLD AUTO: 278 K/UL (ref 164–446)
PMV BLD AUTO: 10.6 FL (ref 9–12.9)
RBC # BLD AUTO: 5.7 M/UL (ref 4.7–6.1)
WBC # BLD AUTO: 6.3 K/UL (ref 4.8–10.8)

## 2018-10-31 PROCEDURE — 85027 COMPLETE CBC AUTOMATED: CPT

## 2018-10-31 PROCEDURE — 93005 ELECTROCARDIOGRAM TRACING: CPT

## 2018-10-31 PROCEDURE — 80048 BASIC METABOLIC PNL TOTAL CA: CPT

## 2018-10-31 PROCEDURE — 93010 ELECTROCARDIOGRAM REPORT: CPT | Performed by: INTERNAL MEDICINE

## 2018-10-31 PROCEDURE — 36415 COLL VENOUS BLD VENIPUNCTURE: CPT

## 2018-10-31 RX ORDER — SAXAGLIPTIN 5 MG/1
5 TABLET, FILM COATED ORAL DAILY
COMMUNITY
End: 2020-02-18

## 2018-10-31 NOTE — OR NURSING
Pre admit apt: Pt. Instructed to continue regularly prescribed medications through day before surgery.  Instructed to take the following medications, the day of surgery, with a sip of water per anesthesia protocol: lovastatin

## 2018-11-01 LAB
ANION GAP SERPL CALC-SCNC: 11 MMOL/L (ref 0–11.9)
BUN SERPL-MCNC: 16 MG/DL (ref 8–22)
CALCIUM SERPL-MCNC: 10.2 MG/DL (ref 8.5–10.5)
CHLORIDE SERPL-SCNC: 102 MMOL/L (ref 96–112)
CO2 SERPL-SCNC: 27 MMOL/L (ref 20–33)
CREAT SERPL-MCNC: 1.02 MG/DL (ref 0.5–1.4)
EKG IMPRESSION: NORMAL
GLUCOSE SERPL-MCNC: 158 MG/DL (ref 65–99)
POTASSIUM SERPL-SCNC: 3.4 MMOL/L (ref 3.6–5.5)
SODIUM SERPL-SCNC: 140 MMOL/L (ref 135–145)

## 2018-11-05 ENCOUNTER — HOSPITAL ENCOUNTER (OUTPATIENT)
Facility: MEDICAL CENTER | Age: 48
End: 2018-11-05
Attending: ORTHOPAEDIC SURGERY | Admitting: ORTHOPAEDIC SURGERY
Payer: COMMERCIAL

## 2018-11-05 VITALS
RESPIRATION RATE: 16 BRPM | DIASTOLIC BLOOD PRESSURE: 94 MMHG | HEIGHT: 72 IN | WEIGHT: 241.18 LBS | BODY MASS INDEX: 32.67 KG/M2 | SYSTOLIC BLOOD PRESSURE: 134 MMHG | HEART RATE: 66 BPM | OXYGEN SATURATION: 92 % | TEMPERATURE: 97.2 F

## 2018-11-05 LAB — GLUCOSE BLD-MCNC: 134 MG/DL (ref 65–99)

## 2018-11-05 PROCEDURE — 160009 HCHG ANES TIME/MIN: Performed by: ORTHOPAEDIC SURGERY

## 2018-11-05 PROCEDURE — 160036 HCHG PACU - EA ADDL 30 MINS PHASE I: Performed by: ORTHOPAEDIC SURGERY

## 2018-11-05 PROCEDURE — 160046 HCHG PACU - 1ST 60 MINS PHASE II: Performed by: ORTHOPAEDIC SURGERY

## 2018-11-05 PROCEDURE — 502581 HCHG PACK, SHOULDER ARTHROSCOPY: Performed by: ORTHOPAEDIC SURGERY

## 2018-11-05 PROCEDURE — A6222 GAUZE <=16 IN NO W/SAL W/O B: HCPCS | Performed by: ORTHOPAEDIC SURGERY

## 2018-11-05 PROCEDURE — 160002 HCHG RECOVERY MINUTES (STAT): Performed by: ORTHOPAEDIC SURGERY

## 2018-11-05 PROCEDURE — 501445 HCHG STAPLER, SKIN DISP: Performed by: ORTHOPAEDIC SURGERY

## 2018-11-05 PROCEDURE — A9270 NON-COVERED ITEM OR SERVICE: HCPCS | Performed by: ANESTHESIOLOGY

## 2018-11-05 PROCEDURE — 700101 HCHG RX REV CODE 250

## 2018-11-05 PROCEDURE — 500423 HCHG DRESSING, ABD COMBINE: Performed by: ORTHOPAEDIC SURGERY

## 2018-11-05 PROCEDURE — 160025 RECOVERY II MINUTES (STATS): Performed by: ORTHOPAEDIC SURGERY

## 2018-11-05 PROCEDURE — 500447 HCHG DRESSING, TEGADERM 8X12: Performed by: ORTHOPAEDIC SURGERY

## 2018-11-05 PROCEDURE — 160048 HCHG OR STATISTICAL LEVEL 1-5: Performed by: ORTHOPAEDIC SURGERY

## 2018-11-05 PROCEDURE — 700111 HCHG RX REV CODE 636 W/ 250 OVERRIDE (IP): Performed by: ANESTHESIOLOGY

## 2018-11-05 PROCEDURE — 160035 HCHG PACU - 1ST 60 MINS PHASE I: Performed by: ORTHOPAEDIC SURGERY

## 2018-11-05 PROCEDURE — 500144 HCHG CANNULA KIT, UNIV GREY-SHOULDER: Performed by: ORTHOPAEDIC SURGERY

## 2018-11-05 PROCEDURE — 500112 HCHG BONE WAX: Performed by: ORTHOPAEDIC SURGERY

## 2018-11-05 PROCEDURE — 160029 HCHG SURGERY MINUTES - 1ST 30 MINS LEVEL 4: Performed by: ORTHOPAEDIC SURGERY

## 2018-11-05 PROCEDURE — 160047 HCHG PACU  - EA ADDL 30 MINS PHASE II: Performed by: ORTHOPAEDIC SURGERY

## 2018-11-05 PROCEDURE — 700102 HCHG RX REV CODE 250 W/ 637 OVERRIDE(OP): Performed by: ANESTHESIOLOGY

## 2018-11-05 PROCEDURE — 160041 HCHG SURGERY MINUTES - EA ADDL 1 MIN LEVEL 4: Performed by: ORTHOPAEDIC SURGERY

## 2018-11-05 PROCEDURE — 700111 HCHG RX REV CODE 636 W/ 250 OVERRIDE (IP)

## 2018-11-05 PROCEDURE — 82962 GLUCOSE BLOOD TEST: CPT

## 2018-11-05 PROCEDURE — 501838 HCHG SUTURE GENERAL: Performed by: ORTHOPAEDIC SURGERY

## 2018-11-05 PROCEDURE — 502240 HCHG MISC OR SUPPLY RC 0272: Performed by: ORTHOPAEDIC SURGERY

## 2018-11-05 RX ORDER — CLINDAMYCIN PHOSPHATE 600 MG/50ML
INJECTION, SOLUTION INTRAVENOUS
Status: COMPLETED
Start: 2018-11-05 | End: 2018-11-05

## 2018-11-05 RX ORDER — HYDRALAZINE HYDROCHLORIDE 20 MG/ML
5 INJECTION INTRAMUSCULAR; INTRAVENOUS
Status: DISCONTINUED | OUTPATIENT
Start: 2018-11-05 | End: 2018-11-05 | Stop reason: HOSPADM

## 2018-11-05 RX ORDER — OXYCODONE HCL 5 MG/5 ML
10 SOLUTION, ORAL ORAL
Status: COMPLETED | OUTPATIENT
Start: 2018-11-05 | End: 2018-11-05

## 2018-11-05 RX ORDER — BACITRACIN 50000 [IU]/1
INJECTION, POWDER, FOR SOLUTION INTRAMUSCULAR
Status: DISCONTINUED | OUTPATIENT
Start: 2018-11-05 | End: 2018-11-05 | Stop reason: HOSPADM

## 2018-11-05 RX ORDER — MEPERIDINE HYDROCHLORIDE 25 MG/ML
12.5 INJECTION INTRAMUSCULAR; INTRAVENOUS; SUBCUTANEOUS
Status: DISCONTINUED | OUTPATIENT
Start: 2018-11-05 | End: 2018-11-05 | Stop reason: HOSPADM

## 2018-11-05 RX ORDER — SODIUM CHLORIDE, SODIUM LACTATE, POTASSIUM CHLORIDE, CALCIUM CHLORIDE 600; 310; 30; 20 MG/100ML; MG/100ML; MG/100ML; MG/100ML
INJECTION, SOLUTION INTRAVENOUS ONCE
Status: COMPLETED | OUTPATIENT
Start: 2018-11-05 | End: 2018-11-05

## 2018-11-05 RX ORDER — CLINDAMYCIN PHOSPHATE 600 MG/50ML
600 INJECTION, SOLUTION INTRAVENOUS
Status: COMPLETED | OUTPATIENT
Start: 2018-11-05 | End: 2018-11-05

## 2018-11-05 RX ORDER — HYDROMORPHONE HYDROCHLORIDE 2 MG/ML
0.6 INJECTION, SOLUTION INTRAMUSCULAR; INTRAVENOUS; SUBCUTANEOUS
Status: DISCONTINUED | OUTPATIENT
Start: 2018-11-05 | End: 2018-11-05 | Stop reason: HOSPADM

## 2018-11-05 RX ORDER — LABETALOL HYDROCHLORIDE 5 MG/ML
5 INJECTION, SOLUTION INTRAVENOUS
Status: DISCONTINUED | OUTPATIENT
Start: 2018-11-05 | End: 2018-11-05 | Stop reason: HOSPADM

## 2018-11-05 RX ORDER — CLINDAMYCIN PHOSPHATE 150 MG/ML
600 INJECTION, SOLUTION INTRAVENOUS
Status: DISCONTINUED | OUTPATIENT
Start: 2018-11-05 | End: 2018-11-05

## 2018-11-05 RX ORDER — HYDROMORPHONE HYDROCHLORIDE 2 MG/ML
0.2 INJECTION, SOLUTION INTRAMUSCULAR; INTRAVENOUS; SUBCUTANEOUS
Status: DISCONTINUED | OUTPATIENT
Start: 2018-11-05 | End: 2018-11-05 | Stop reason: HOSPADM

## 2018-11-05 RX ORDER — IPRATROPIUM BROMIDE AND ALBUTEROL SULFATE 2.5; .5 MG/3ML; MG/3ML
3 SOLUTION RESPIRATORY (INHALATION)
Status: DISCONTINUED | OUTPATIENT
Start: 2018-11-05 | End: 2018-11-05 | Stop reason: HOSPADM

## 2018-11-05 RX ORDER — LIDOCAINE HYDROCHLORIDE 10 MG/ML
INJECTION, SOLUTION INFILTRATION; PERINEURAL
Status: COMPLETED
Start: 2018-11-05 | End: 2018-11-05

## 2018-11-05 RX ORDER — MORPHINE SULFATE 10 MG/ML
INJECTION, SOLUTION INTRAMUSCULAR; INTRAVENOUS
Status: DISCONTINUED | OUTPATIENT
Start: 2018-11-05 | End: 2018-11-05 | Stop reason: HOSPADM

## 2018-11-05 RX ORDER — SODIUM CHLORIDE, SODIUM LACTATE, POTASSIUM CHLORIDE, CALCIUM CHLORIDE 600; 310; 30; 20 MG/100ML; MG/100ML; MG/100ML; MG/100ML
INJECTION, SOLUTION INTRAVENOUS CONTINUOUS
Status: DISCONTINUED | OUTPATIENT
Start: 2018-11-05 | End: 2018-11-05 | Stop reason: HOSPADM

## 2018-11-05 RX ORDER — HALOPERIDOL 5 MG/ML
1 INJECTION INTRAMUSCULAR
Status: DISCONTINUED | OUTPATIENT
Start: 2018-11-05 | End: 2018-11-05 | Stop reason: HOSPADM

## 2018-11-05 RX ORDER — DIPHENHYDRAMINE HYDROCHLORIDE 50 MG/ML
12.5 INJECTION INTRAMUSCULAR; INTRAVENOUS
Status: DISCONTINUED | OUTPATIENT
Start: 2018-11-05 | End: 2018-11-05 | Stop reason: HOSPADM

## 2018-11-05 RX ORDER — OXYCODONE HYDROCHLORIDE 10 MG/1
10 TABLET ORAL
Status: DISCONTINUED | OUTPATIENT
Start: 2018-11-05 | End: 2018-11-05 | Stop reason: HOSPADM

## 2018-11-05 RX ORDER — ROPIVACAINE HYDROCHLORIDE 5 MG/ML
INJECTION, SOLUTION EPIDURAL; INFILTRATION; PERINEURAL
Status: DISCONTINUED | OUTPATIENT
Start: 2018-11-05 | End: 2018-11-05 | Stop reason: HOSPADM

## 2018-11-05 RX ORDER — ONDANSETRON 2 MG/ML
4 INJECTION INTRAMUSCULAR; INTRAVENOUS
Status: DISCONTINUED | OUTPATIENT
Start: 2018-11-05 | End: 2018-11-05 | Stop reason: HOSPADM

## 2018-11-05 RX ORDER — OXYCODONE HCL 5 MG/5 ML
5 SOLUTION, ORAL ORAL
Status: COMPLETED | OUTPATIENT
Start: 2018-11-05 | End: 2018-11-05

## 2018-11-05 RX ORDER — HYDROMORPHONE HYDROCHLORIDE 2 MG/ML
0.4 INJECTION, SOLUTION INTRAMUSCULAR; INTRAVENOUS; SUBCUTANEOUS
Status: DISCONTINUED | OUTPATIENT
Start: 2018-11-05 | End: 2018-11-05 | Stop reason: HOSPADM

## 2018-11-05 RX ORDER — OXYCODONE HYDROCHLORIDE 5 MG/1
5 TABLET ORAL
Status: DISCONTINUED | OUTPATIENT
Start: 2018-11-05 | End: 2018-11-05 | Stop reason: HOSPADM

## 2018-11-05 RX ORDER — KETOROLAC TROMETHAMINE 30 MG/ML
INJECTION, SOLUTION INTRAMUSCULAR; INTRAVENOUS
Status: DISCONTINUED | OUTPATIENT
Start: 2018-11-05 | End: 2018-11-05 | Stop reason: HOSPADM

## 2018-11-05 RX ADMIN — OXYCODONE HYDROCHLORIDE 5 MG: 5 SOLUTION ORAL at 11:19

## 2018-11-05 RX ADMIN — SODIUM CHLORIDE, SODIUM LACTATE, POTASSIUM CHLORIDE, CALCIUM CHLORIDE: 600; 310; 30; 20 INJECTION, SOLUTION INTRAVENOUS at 07:54

## 2018-11-05 RX ADMIN — CLINDAMYCIN IN 5 PERCENT DEXTROSE 600 MG: 12 INJECTION, SOLUTION INTRAVENOUS at 07:52

## 2018-11-05 RX ADMIN — LIDOCAINE HYDROCHLORIDE 0.2 ML: 10 INJECTION, SOLUTION INFILTRATION; PERINEURAL at 07:52

## 2018-11-05 ASSESSMENT — PAIN SCALES - GENERAL
PAINLEVEL_OUTOF10: 6
PAINLEVEL_OUTOF10: 5
PAINLEVEL_OUTOF10: 5
PAINLEVEL_OUTOF10: 6
PAINLEVEL_OUTOF10: 5
PAINLEVEL_OUTOF10: 5

## 2018-11-05 NOTE — OR NURSING
1145: Report from ERIN Salinas.  Patient sitting up in bed, reports he has some pain in shoulder but states it is tolerable.  Radial pulse in right arm is 2+, fingers pink and warm, patient can feel sensations in fingers, cap refill <3 seconds.    1200: IS provided to encourage deep breathing and help improve O2 sats.   1205: Report to ERIN Salinas.

## 2018-11-05 NOTE — OR NURSING
1214 PT RECEIVED IN STAGE 2, REPORT RECEIVED.  PT DRESSED, TRANSFERRED TO RECLINER.  1220 VSS. PT REPORTS RIGHT SHOULDER PAIN 5/10 AND TOLERABLE. TOLERATING PO FLUIDS. CONTINUES IS. S.O. AT CHAIRSIDE. 1235 PT TO ROOM AIR. 1315 PT MAINTAINS O2 SAT >90% DURING STAY IN STAGE 2. PT MEETS CRITERIA FOR D/C TO HOME.

## 2018-11-05 NOTE — OR NURSING
1048 To PACU from OR via gurney. Pt drowsy, respirations spontaneous and non-labored. Icepack applied over c/d/i right shoulder surgical dressings, immobilizer in place. Fingers are pink and warm, radial pulse palpated, brisk cap refill observed.  1105 Pt reports 6/10 pain to his right shoulder, denies nausea.  1120 PO and IV medications given.  1135 Pt reports 5/10 tolerable pain, denies nausea. Pt's contact updated.  1145 Report to ERIN Hernandes.  1210 No changes. Pt working with IS, still requiring 1 liter of oxygen. Report to ERIN Davis.

## 2018-11-05 NOTE — DISCHARGE INSTRUCTIONS
ACTIVITY: Rest and take it easy for the first 24 hours.  A responsible adult is recommended to remain with you during that time.  It is normal to feel sleepy.  We encourage you to not do anything that requires balance, judgment or coordination.    MILD FLU-LIKE SYMPTOMS ARE NORMAL. YOU MAY EXPERIENCE GENERALIZED MUSCLE ACHES, THROAT IRRITATION, HEADACHE AND/OR SOME NAUSEA.    FOR 24 HOURS DO NOT:  Drive, operate machinery or run household appliances.  Drink beer or alcoholic beverages.   Make important decisions or sign legal documents.    SPECIAL INSTRUCTIONS:   1. Discharge home   2. Discharge home on home meds   3. Discharge home on Percocet, ASA, Meloxicam   4. Outpatient physical therapy to begin in 2-4 days.   5.  Follow up [facility] 10-14 days   6. Call for Fevers, drainage, redness, shortness of breath, or increasing extremity swelling particularly in the calf.   7. Start Aspirin 325mg per day. Use for thirty days.   8. Remove bandage in 5 days.  Replace sooner for drainage and notify office.   9.  Wear MARKO (compressive stocking) for 2 weeks on both lower extremities.   10. Sling to the right upper extremity    DIET: To avoid nausea, slowly advance diet as tolerated, avoiding spicy or greasy foods for the first day.  Add more substantial food to your diet according to your physician's instructions.  Babies can be fed formula or breast milk as soon as they are hungry.  INCREASE FLUIDS AND FIBER TO AVOID CONSTIPATION.    SURGICAL DRESSING/BATHING: May shower with dressings covered beginning on post op day 2 (Wednesday)    FOLLOW-UP APPOINTMENT:  A follow-up appointment should be arranged with your doctor; call to schedule.    You should CALL YOUR PHYSICIAN if you develop:  Fever greater than 101 degrees F.  Pain not relieved by medication, or persistent nausea or vomiting.  Excessive bleeding (blood soaking through dressing) or unexpected drainage from the wound.  Extreme redness or swelling around the  incision site, drainage of pus or foul smelling drainage.  Inability to urinate or empty your bladder within 8 hours.  Problems with breathing or chest pain.    You should call 911 if you develop problems with breathing or chest pain.  If you are unable to contact your doctor or surgical center, you should go to the nearest emergency room or urgent care center.    Physician's telephone #: [phone] (Dr Weinstein)    If any questions arise, call your doctor.  If your doctor is not available, please feel free to call the Surgical Center at [phone].  The Center is open Monday through Friday from 7AM to 7PM.  You can also call the HEALTH HOTLINE open 24 hours/day, 7 days/week and speak to a nurse at [phone], or toll free at [phone].    A registered nurse may call you a few days after your surgery to see how you are doing after your procedure.    MEDICATIONS: Resume taking daily medication.  Take prescribed pain medication with food.  If no medication is prescribed, you may take non-aspirin pain medication if needed.  PAIN MEDICATION CAN BE VERY CONSTIPATING.  Take a stool softener or laxative such as senokot, pericolace, or milk of magnesia if needed.    Prescription given preop.  Last pain medication given at 11:20 am.    If your physician has prescribed pain medication that includes Acetaminophen (Tylenol), do not take additional Acetaminophen (Tylenol) while taking the prescribed medication.          Depression / Suicide Risk    As you are discharged from this [facility] facility, it is important to learn how to keep safe from harming yourself.    Recognize the warning signs:  · Abrupt changes in personality, positive or negative- including increase in energy   · Giving away possessions  · Change in eating patterns- significant weight changes-  positive or negative  · Change in sleeping patterns- unable to sleep or sleeping all the time   · Unwillingness or inability to  communicate  · Depression  · Unusual sadness, discouragement and loneliness  · Talk of wanting to die  · Neglect of personal appearance   · Rebelliousness- reckless behavior  · Withdrawal from people/activities they love  · Confusion- inability to concentrate     If you or a loved one observes any of these behaviors or has concerns about self-harm, here's what you can do:  · Talk about it- your feelings and reasons for harming yourself  · Remove any means that you might use to hurt yourself (examples: pills, rope, extension cords, firearm)  · Get professional help from the community (Mental Health, Substance Abuse, psychological counseling)  · Do not be alone:Call your Safe Contact- someone whom you trust who will be there for you.  · Call your local CRISIS HOTLINE 056-7218 or 493-810-3021  · Call your local Children's Mobile Crisis Response Team [place] (972) 433-1132 or www.yepme.com  · Call the toll free National Suicide Prevention Hotlines   · National Suicide Prevention Lifeline 982-007-VMAA (2721)  · National Hope Line Network 800-SUICIDE (739-6557)

## 2018-11-05 NOTE — OP REPORT
Date of Surgery:  11/5/2018    PREOPERATIVE DIAGNOSES   Right shoulder pain.  Right shoulder impingement.  Right shoulder acromioclavicular arthritis.  Right shoulder superior labral tear  Right shoulder coracohumeral impingement       POSTOPERATIVE DIAGNOSES:  Right shoulder pain.  Right shoulder impingement.  Right shoulder acromioclavicular arthritis.  Right shoulder superior labral tear  Right shoulder coracohumeral impingement      PROCEDURES:  Right shoulder arthroscopic debridement of the labrum.  Right shoulder arthroscopic partial synovectomy.  Right shoulder open distal clavicle excision.  Right shoulder open subacromial decompression.  Right shoulder arthroscopic lateral coracoid decompression  Right shoulder open biceps tenodesis.        SURGEON:  Wesly Weinstein MD.     ASSISTANT: Paola Jimenez     ANESTHESIA:  General.     ANESTHESIOLOGIST: Dr. VelázquezS:  Stable subacromial decompression.  Stable distal clavicle excision.  Stable arthroscopic labral debridement.  Noncompetent insertional tendinopathy of the biceps on the superior glenoid.  Stable biceps tenodesis.  Stable lateral coracoid decompression       COMPLICATIONS:  None.       PROCEDURE IN DETAIL:  The patient was taken to the operating room where he    underwent general anesthetic and then was positioned on the beach-chair    apparatus.  All bony prominences padded.  Right upper extremity was isolated,    prepped using Hibiclens, alcohol, ChloraPrep, draped using sterile technique.    Perioperative antibiotics were given.  An appropriate time-out was    undertaken.  The spider was utilized for stabilization.  Surgery initiated    with instilling 30 mL of saline at the joint and then placement of a posterior   and an anterior portal under direct localization.     Diagnostic arthroscopy showed intact articular surfaces.     The labrum had significant degenerative changes and therefore was treated with   a shaver chondroplasty and  Oratec/Arthrocare stabilization.The superior labrum had a deficiency in it and was hypermobile secondary to  insertional tendonopathy.  For this reason, a decision was undertaken to tenotomize the biceps intraarticularly.  This was done and stabilized with the shaver and Arthrocare.    Lateral coracoid was then addressed using Oratec/Arthrocare and shaver chondroplasty.  A lateral coracoid decompression using the motorized shaver was compared needed.  There was mild subscapularis tendinopathy at the insertion.    At this point, the cannulas were removed.  The portals were closed using interrupted nylon and the shoulder addressed with a mini open approach.    Skin and subcutaneous tissue were incised.      Subperiosteal dissection of the distal clavicle was followed by resection of the distal 1 cm after elevation of the periosteum.  This was treated with hemostasis, Bovie, periarticular anesthetization and then the fascia over this repaired after distal clavicle    excision using 0 Monocryl.  Periarticular anesthetization was completed as noted.     The deltoid was split in line with the anterolateral corner and subacromial    bursa was identified and excised.  The subacromial decompression had been accomplished with the undersurface decompression of the CA ligament insertion and then an osteotomy of the acromion using an oscillating saw and hand rasp.       The biceps tendon was addressed through the bicipital groove with incision and   creation of a carlos hole proximally.  The bicipital groove was burred as well.  The suture was passed over bone bridges anteriorly, stitched into the biceps   tendon with Krackow whipstitch and then subsequent repair of the biceps into    the keyhole with a tenodesis and external oversew with #1 FiberWire.     Thorough irrigation was undertaken and anesthetization was undertaken using    ropivacaine, morphine and Toradol.  The patient was then placed into arm at    the side position  and closure undertaken after hemostasis obtained.     The deltoid was repaired using 0 Monocryl.  The subcutaneous 0 and 3-0    Monocryl were utilized.  Steri-Strips were used on the skin.  The patient    placed into a sterile bandage, awoken from his anesthetic and taken to the    recovery room, tolerated the procedure very well with no signs of    complications.        ____________________________________     FAY PULIDO MD

## 2018-11-12 ENCOUNTER — APPOINTMENT (OUTPATIENT)
Dept: RADIOLOGY | Facility: MEDICAL CENTER | Age: 48
End: 2018-11-12
Attending: EMERGENCY MEDICINE
Payer: COMMERCIAL

## 2018-11-12 ENCOUNTER — HOSPITAL ENCOUNTER (OUTPATIENT)
Facility: MEDICAL CENTER | Age: 48
End: 2018-11-13
Attending: EMERGENCY MEDICINE | Admitting: HOSPITALIST
Payer: COMMERCIAL

## 2018-11-12 DIAGNOSIS — R06.01 ORTHOPNEA: ICD-10-CM

## 2018-11-12 DIAGNOSIS — R07.9 CHEST PAIN, UNSPECIFIED TYPE: ICD-10-CM

## 2018-11-12 PROBLEM — E78.00 HIGH CHOLESTEROL: Status: ACTIVE | Noted: 2018-11-12

## 2018-11-12 LAB
ALBUMIN SERPL BCP-MCNC: 4.3 G/DL (ref 3.2–4.9)
ALBUMIN/GLOB SERPL: 1.5 G/DL
ALP SERPL-CCNC: 38 U/L (ref 30–99)
ALT SERPL-CCNC: 41 U/L (ref 2–50)
ANION GAP SERPL CALC-SCNC: 7 MMOL/L (ref 0–11.9)
APTT PPP: 24.8 SEC (ref 24.7–36)
AST SERPL-CCNC: 27 U/L (ref 12–45)
BASOPHILS # BLD AUTO: 0.6 % (ref 0–1.8)
BASOPHILS # BLD: 0.03 K/UL (ref 0–0.12)
BILIRUB SERPL-MCNC: 1.2 MG/DL (ref 0.1–1.5)
BNP SERPL-MCNC: 18 PG/ML (ref 0–100)
BUN SERPL-MCNC: 19 MG/DL (ref 8–22)
CALCIUM SERPL-MCNC: 8.7 MG/DL (ref 8.4–10.2)
CHLORIDE SERPL-SCNC: 103 MMOL/L (ref 96–112)
CO2 SERPL-SCNC: 25 MMOL/L (ref 20–33)
CREAT SERPL-MCNC: 0.9 MG/DL (ref 0.5–1.4)
EKG IMPRESSION: NORMAL
EOSINOPHIL # BLD AUTO: 0.31 K/UL (ref 0–0.51)
EOSINOPHIL NFR BLD: 6.4 % (ref 0–6.9)
ERYTHROCYTE [DISTWIDTH] IN BLOOD BY AUTOMATED COUNT: 40.9 FL (ref 35.9–50)
GLOBULIN SER CALC-MCNC: 2.9 G/DL (ref 1.9–3.5)
GLUCOSE BLD-MCNC: 164 MG/DL (ref 65–99)
GLUCOSE BLD-MCNC: 251 MG/DL (ref 65–99)
GLUCOSE SERPL-MCNC: 158 MG/DL (ref 65–99)
HCT VFR BLD AUTO: 47.8 % (ref 42–52)
HGB BLD-MCNC: 16.8 G/DL (ref 14–18)
IMM GRANULOCYTES # BLD AUTO: 0.02 K/UL (ref 0–0.11)
IMM GRANULOCYTES NFR BLD AUTO: 0.4 % (ref 0–0.9)
INR PPP: 0.89 (ref 0.87–1.13)
LIPASE SERPL-CCNC: 43 U/L (ref 7–58)
LYMPHOCYTES # BLD AUTO: 1.97 K/UL (ref 1–4.8)
LYMPHOCYTES NFR BLD: 40.6 % (ref 22–41)
MAGNESIUM SERPL-MCNC: 2.1 MG/DL (ref 1.5–2.5)
MAGNESIUM SERPL-MCNC: 2.2 MG/DL (ref 1.5–2.5)
MCH RBC QN AUTO: 31.6 PG (ref 27–33)
MCHC RBC AUTO-ENTMCNC: 35.1 G/DL (ref 33.7–35.3)
MCV RBC AUTO: 90 FL (ref 81.4–97.8)
MONOCYTES # BLD AUTO: 0.4 K/UL (ref 0–0.85)
MONOCYTES NFR BLD AUTO: 8.2 % (ref 0–13.4)
NEUTROPHILS # BLD AUTO: 2.12 K/UL (ref 1.82–7.42)
NEUTROPHILS NFR BLD: 43.8 % (ref 44–72)
NRBC # BLD AUTO: 0 K/UL
NRBC BLD-RTO: 0 /100 WBC
PHOSPHATE SERPL-MCNC: 2.1 MG/DL (ref 2.5–4.5)
PLATELET # BLD AUTO: 255 K/UL (ref 164–446)
PMV BLD AUTO: 9.9 FL (ref 9–12.9)
POTASSIUM SERPL-SCNC: 3.5 MMOL/L (ref 3.6–5.5)
PROT SERPL-MCNC: 7.2 G/DL (ref 6–8.2)
PROTHROMBIN TIME: 12 SEC (ref 12–14.6)
RBC # BLD AUTO: 5.31 M/UL (ref 4.7–6.1)
SODIUM SERPL-SCNC: 135 MMOL/L (ref 135–145)
T4 FREE SERPL-MCNC: 1.12 NG/DL (ref 0.58–1.64)
TROPONIN I SERPL-MCNC: <0.02 NG/ML (ref 0–0.04)
TSH SERPL DL<=0.005 MIU/L-ACNC: 0.82 UIU/ML (ref 0.38–5.33)
WBC # BLD AUTO: 4.9 K/UL (ref 4.8–10.8)

## 2018-11-12 PROCEDURE — 80053 COMPREHEN METABOLIC PANEL: CPT

## 2018-11-12 PROCEDURE — 700111 HCHG RX REV CODE 636 W/ 250 OVERRIDE (IP): Performed by: HOSPITALIST

## 2018-11-12 PROCEDURE — 36415 COLL VENOUS BLD VENIPUNCTURE: CPT

## 2018-11-12 PROCEDURE — 93005 ELECTROCARDIOGRAM TRACING: CPT | Performed by: EMERGENCY MEDICINE

## 2018-11-12 PROCEDURE — 84484 ASSAY OF TROPONIN QUANT: CPT | Mod: 91

## 2018-11-12 PROCEDURE — A9270 NON-COVERED ITEM OR SERVICE: HCPCS | Performed by: HOSPITALIST

## 2018-11-12 PROCEDURE — 71275 CT ANGIOGRAPHY CHEST: CPT

## 2018-11-12 PROCEDURE — 99219 PR INITIAL OBSERVATION CARE,LEVL II: CPT | Performed by: HOSPITALIST

## 2018-11-12 PROCEDURE — 71045 X-RAY EXAM CHEST 1 VIEW: CPT

## 2018-11-12 PROCEDURE — 99285 EMERGENCY DEPT VISIT HI MDM: CPT

## 2018-11-12 PROCEDURE — 85025 COMPLETE CBC W/AUTO DIFF WBC: CPT

## 2018-11-12 PROCEDURE — 700102 HCHG RX REV CODE 250 W/ 637 OVERRIDE(OP): Performed by: HOSPITALIST

## 2018-11-12 PROCEDURE — 84443 ASSAY THYROID STIM HORMONE: CPT

## 2018-11-12 PROCEDURE — 83690 ASSAY OF LIPASE: CPT

## 2018-11-12 PROCEDURE — G0378 HOSPITAL OBSERVATION PER HR: HCPCS

## 2018-11-12 PROCEDURE — 85610 PROTHROMBIN TIME: CPT

## 2018-11-12 PROCEDURE — 84100 ASSAY OF PHOSPHORUS: CPT

## 2018-11-12 PROCEDURE — 700117 HCHG RX CONTRAST REV CODE 255: Performed by: EMERGENCY MEDICINE

## 2018-11-12 PROCEDURE — 85730 THROMBOPLASTIN TIME PARTIAL: CPT

## 2018-11-12 PROCEDURE — 93005 ELECTROCARDIOGRAM TRACING: CPT

## 2018-11-12 PROCEDURE — 82962 GLUCOSE BLOOD TEST: CPT | Mod: 91

## 2018-11-12 PROCEDURE — 83735 ASSAY OF MAGNESIUM: CPT

## 2018-11-12 PROCEDURE — 83880 ASSAY OF NATRIURETIC PEPTIDE: CPT

## 2018-11-12 PROCEDURE — 84439 ASSAY OF FREE THYROXINE: CPT

## 2018-11-12 RX ORDER — ASPIRIN 81 MG/1
324 TABLET, CHEWABLE ORAL DAILY
Status: DISCONTINUED | OUTPATIENT
Start: 2018-11-12 | End: 2018-11-13 | Stop reason: HOSPADM

## 2018-11-12 RX ORDER — ASPIRIN 325 MG
325 TABLET ORAL DAILY
COMMUNITY
End: 2020-01-13

## 2018-11-12 RX ORDER — HYDROMORPHONE HYDROCHLORIDE 1 MG/ML
0.25 INJECTION, SOLUTION INTRAMUSCULAR; INTRAVENOUS; SUBCUTANEOUS
Status: DISCONTINUED | OUTPATIENT
Start: 2018-11-12 | End: 2018-11-13 | Stop reason: HOSPADM

## 2018-11-12 RX ORDER — ASPIRIN 325 MG
325 TABLET ORAL DAILY
Status: DISCONTINUED | OUTPATIENT
Start: 2018-11-12 | End: 2018-11-13

## 2018-11-12 RX ORDER — HEPARIN SODIUM 5000 [USP'U]/ML
5000 INJECTION, SOLUTION INTRAVENOUS; SUBCUTANEOUS EVERY 8 HOURS
Status: DISCONTINUED | OUTPATIENT
Start: 2018-11-12 | End: 2018-11-13 | Stop reason: HOSPADM

## 2018-11-12 RX ORDER — HYDROCHLOROTHIAZIDE 25 MG/1
25 TABLET ORAL
Status: DISCONTINUED | OUTPATIENT
Start: 2018-11-12 | End: 2018-11-13 | Stop reason: HOSPADM

## 2018-11-12 RX ORDER — AMOXICILLIN 250 MG
2 CAPSULE ORAL 2 TIMES DAILY
Status: DISCONTINUED | OUTPATIENT
Start: 2018-11-12 | End: 2018-11-13 | Stop reason: HOSPADM

## 2018-11-12 RX ORDER — ASPIRIN 600 MG/1
300 SUPPOSITORY RECTAL DAILY
Status: DISCONTINUED | OUTPATIENT
Start: 2018-11-12 | End: 2018-11-13 | Stop reason: HOSPADM

## 2018-11-12 RX ORDER — ACETAMINOPHEN 325 MG/1
650 TABLET ORAL EVERY 6 HOURS PRN
Status: DISCONTINUED | OUTPATIENT
Start: 2018-11-12 | End: 2018-11-13 | Stop reason: HOSPADM

## 2018-11-12 RX ORDER — POLYETHYLENE GLYCOL 3350 17 G/17G
1 POWDER, FOR SOLUTION ORAL
Status: DISCONTINUED | OUTPATIENT
Start: 2018-11-12 | End: 2018-11-13 | Stop reason: HOSPADM

## 2018-11-12 RX ORDER — DEXTROSE MONOHYDRATE 25 G/50ML
25 INJECTION, SOLUTION INTRAVENOUS
Status: DISCONTINUED | OUTPATIENT
Start: 2018-11-12 | End: 2018-11-13 | Stop reason: HOSPADM

## 2018-11-12 RX ORDER — OXYCODONE HYDROCHLORIDE 5 MG/1
5 TABLET ORAL
Status: DISCONTINUED | OUTPATIENT
Start: 2018-11-12 | End: 2018-11-13 | Stop reason: HOSPADM

## 2018-11-12 RX ORDER — BISACODYL 10 MG
10 SUPPOSITORY, RECTAL RECTAL
Status: DISCONTINUED | OUTPATIENT
Start: 2018-11-12 | End: 2018-11-13 | Stop reason: HOSPADM

## 2018-11-12 RX ORDER — LOSARTAN POTASSIUM AND HYDROCHLOROTHIAZIDE 25; 100 MG/1; MG/1
1 TABLET ORAL DAILY
Status: DISCONTINUED | OUTPATIENT
Start: 2018-11-12 | End: 2018-11-12

## 2018-11-12 RX ORDER — ASPIRIN 325 MG
325 TABLET ORAL DAILY
Status: DISCONTINUED | OUTPATIENT
Start: 2018-11-12 | End: 2018-11-13 | Stop reason: HOSPADM

## 2018-11-12 RX ORDER — OXYCODONE HYDROCHLORIDE 5 MG/1
2.5 TABLET ORAL
Status: DISCONTINUED | OUTPATIENT
Start: 2018-11-12 | End: 2018-11-13 | Stop reason: HOSPADM

## 2018-11-12 RX ORDER — OXYCODONE HYDROCHLORIDE AND ACETAMINOPHEN 5; 325 MG/1; MG/1
1-2 TABLET ORAL EVERY 4 HOURS PRN
Status: SHIPPED | COMMUNITY
End: 2018-11-12

## 2018-11-12 RX ORDER — LOVASTATIN 20 MG/1
20 TABLET ORAL
Status: DISCONTINUED | OUTPATIENT
Start: 2018-11-12 | End: 2018-11-13 | Stop reason: HOSPADM

## 2018-11-12 RX ORDER — HYDROCODONE BITARTRATE AND ACETAMINOPHEN 5; 325 MG/1; MG/1
1-2 TABLET ORAL EVERY 4 HOURS PRN
COMMUNITY
End: 2018-11-20

## 2018-11-12 RX ORDER — MELOXICAM 7.5 MG/1
7.5 TABLET ORAL DAILY
COMMUNITY
Start: 2018-11-05 | End: 2023-06-27

## 2018-11-12 RX ORDER — LOSARTAN POTASSIUM 25 MG/1
100 TABLET ORAL
Status: DISCONTINUED | OUTPATIENT
Start: 2018-11-12 | End: 2018-11-13 | Stop reason: HOSPADM

## 2018-11-12 RX ADMIN — IOHEXOL 75 ML: 350 INJECTION, SOLUTION INTRAVENOUS at 12:28

## 2018-11-12 RX ADMIN — INSULIN HUMAN 5 UNITS: 100 INJECTION, SOLUTION PARENTERAL at 20:54

## 2018-11-12 RX ADMIN — OXYCODONE HYDROCHLORIDE 2.5 MG: 5 TABLET ORAL at 17:08

## 2018-11-12 RX ADMIN — LOVASTATIN 20 MG: 20 TABLET ORAL at 20:57

## 2018-11-12 RX ADMIN — HEPARIN SODIUM 5000 UNITS: 5000 INJECTION, SOLUTION INTRAVENOUS; SUBCUTANEOUS at 20:56

## 2018-11-12 ASSESSMENT — ENCOUNTER SYMPTOMS
FEVER: 0
NERVOUS/ANXIOUS: 0
SHORTNESS OF BREATH: 1
BACK PAIN: 0
BLURRED VISION: 0
SORE THROAT: 0
TINGLING: 0
HEADACHES: 0
NAUSEA: 0
CONSTIPATION: 0
CHILLS: 0
DIZZINESS: 0
NECK PAIN: 0
STRIDOR: 0
EYE PAIN: 0
WHEEZING: 0
PHOTOPHOBIA: 0
MEMORY LOSS: 0
COUGH: 0
SENSORY CHANGE: 0
ORTHOPNEA: 0
DOUBLE VISION: 0
DEPRESSION: 0
PALPITATIONS: 0
CLAUDICATION: 0
HEARTBURN: 0
SPEECH CHANGE: 0
TREMORS: 0
MYALGIAS: 0
BLOOD IN STOOL: 0
VOMITING: 0
HEMOPTYSIS: 0
PND: 1
WEAKNESS: 0
SPUTUM PRODUCTION: 0

## 2018-11-12 ASSESSMENT — LIFESTYLE VARIABLES
EVER_SMOKED: NEVER
EVER_SMOKED: NEVER
ALCOHOL_USE: NO

## 2018-11-12 ASSESSMENT — PAIN SCALES - GENERAL
PAINLEVEL_OUTOF10: 0
PAINLEVEL_OUTOF10: 2
PAINLEVEL_OUTOF10: 6
PAINLEVEL_OUTOF10: 0
PAINLEVEL_OUTOF10: 4

## 2018-11-12 ASSESSMENT — PATIENT HEALTH QUESTIONNAIRE - PHQ9
2. FEELING DOWN, DEPRESSED, IRRITABLE, OR HOPELESS: NOT AT ALL
1. LITTLE INTEREST OR PLEASURE IN DOING THINGS: NOT AT ALL
1. LITTLE INTEREST OR PLEASURE IN DOING THINGS: NOT AT ALL
SUM OF ALL RESPONSES TO PHQ9 QUESTIONS 1 AND 2: 0
SUM OF ALL RESPONSES TO PHQ9 QUESTIONS 1 AND 2: 0
2. FEELING DOWN, DEPRESSED, IRRITABLE, OR HOPELESS: NOT AT ALL

## 2018-11-12 ASSESSMENT — COGNITIVE AND FUNCTIONAL STATUS - GENERAL
MOBILITY SCORE: 24
SUGGESTED CMS G CODE MODIFIER DAILY ACTIVITY: CH
SUGGESTED CMS G CODE MODIFIER MOBILITY: CH
DAILY ACTIVITIY SCORE: 24

## 2018-11-12 NOTE — ED NOTES
Med rec updated and complete  Allergies reviewed  Pt reports that he does not known the strengths of his ASPIRIN or pain medications.  Called Andrea @ [phone] to verify medications.  Pt reports no antibiotics in the last 30 days, that he had to take at home.  Pt reports no vitamins.

## 2018-11-12 NOTE — PROGRESS NOTES
Pt arrived to unit via gurney. Ambulated from gurney to bed. Tele monitor applied, vitals taken. Pt assessed. A&O x4. Admit profile and med rec complete. Discussed POC with pt, including stress test. Welcome folder provided and discussed. Communication board filled out. Questions and concerns addressed, verbalized understanding. Fall precautions in place. Pt demonstrates ability to use call light appropriately. Pt left in lowest position.

## 2018-11-12 NOTE — ASSESSMENT & PLAN NOTE
Controlled last A1c 1 month ago was 7.7  Continue Insulin-sliding scale, accu-checks and hypoglycemia protocol.  We have held his home dose of metformin, saxagliptin and Empagliflozin

## 2018-11-12 NOTE — ED PROVIDER NOTES
ED Provider Note    CHIEF COMPLAINT  Chief Complaint   Patient presents with   • Shortness of Breath     For past 4 days. Denies CP. Denies recent illness. Pt post-op rotator cuff sx 1 week ago.        HPI  Myron Lilly is a 48 y.o. male who presents some intermittent chest pressure and shortness of breath for the last 4 days.  Patient states that the shortness of breath is worse with laying flat and exertion.  He denies any leg swelling.  He is status post rotator cuff repair 1 week ago.  He is diabetic.  He states he has been walking around and wearing compression stockings.  He denies any productive cough fevers or chills.  He has not coughed up any blood.  He has no heart history.  He denies any fevers or chills.  Nothing seems to make the pain better.  Laying flat and exertion make it worse.  The patient does have a history of high blood pressure and does take medications.  He states he has not missed any doses.  He denies any smoking drinking or drug use.      REVIEW OF SYSTEMS  HEENT:  No ear pain, congestion or sore throat   EYES: no discharge redness or vision changes  CARDIAC: See history of present illness  PULMONARY: Positive dyspnea, no cough or congestion   GI: no vomiting diarrhea or abdominal pain   : no dysuria, back pain or hematuria   Neuro: no weakness, numbness aphasia or headache  Musculoskeletal: no swelling deformity or pain no joint swelling  Endocrine: no fevers, sweating, weight loss   SKIN: no rash, erythema or contusions     See history of present illness all other systems are negative      PAST MEDICAL HISTORY  Past Medical History:   Diagnosis Date   • Diabetes (HCC) 10/2018    oral meds   • Pain 10/2018    Left quad   • Arthritis     osteo-back   • High cholesterol    • Hypertension        FAMILY HISTORY  Family History   Problem Relation Age of Onset   • Diabetes Mother    • Diabetes Father    • Hypertension Father    • Diabetes Brother        SOCIAL HISTORY  Social History      Social History   • Marital status:      Spouse name: N/A   • Number of children: N/A   • Years of education: N/A     Social History Main Topics   • Smoking status: Never Smoker   • Smokeless tobacco: Former User   • Alcohol use Yes      Comment: 2 per month   • Drug use: No   • Sexual activity: Yes     Partners: Female     Other Topics Concern   •  Service Yes   • Weight Concern Yes   • Exercise No     Social History Narrative   • No narrative on file       SURGICAL HISTORY  Past Surgical History:   Procedure Laterality Date   • SHOULDER ARTHROSCOPY Right 11/5/2018    Procedure: SHOULDER ARTHROSCOPY;  Surgeon: Wesly Weinstein M.D.;  Location: [facility];  Service: Orthopedics   • SHOULDER DECOMPRESSION Right 11/5/2018    Procedure: SHOULDER DECOMPRESSION-  W/MINI OPEN SUBACROMIAL;  Surgeon: Wesly Weinstein M.D.;  Location: [facility];  Service: Orthopedics   • CLAVICLE DISTAL EXCISION Right 11/5/2018    Procedure: CLAVICLE DISTAL EXCISION, LATERAL CORACOID DECOMPRESSION;  Surgeon: Wesly Weinstein M.D.;  Location: [facility];  Service: Orthopedics   • SHOULDER ARTHROSCOPY W/ BICIPITAL TENODESIS REPAIR Right 11/5/2018    Procedure: SHOULDER ARTHROSCOPY W/ BICIPITAL TENODESIS REPAIR;  Surgeon: Wesly Weinstein M.D.;  Location: [facility];  Service: Orthopedics   • ELBOW ORIF Left 1980   • CHOLECYSTECTOMY      approx 2004       CURRENT MEDICATIONS  Home Medications     Reviewed by Bethel Dockery (Pharmacy Tech) on 11/12/18 at 1056  Med List Status: Complete   Medication Last Dose Status   aspirin (ASA) 325 MG Tab 11/12/2018 Active   Empagliflozin (JARDIANCE) 25 MG Tab 11/12/2018 Active   glipiZIDE (GLUCOTROL) 10 MG Tab 11/12/2018 Active   HYDROcodone-acetaminophen (NORCO) 5-325 MG Tab per tablet 11/11/2018 Active   losartan-hydrochlorothiazide (HYZAAR) 100-25 MG per tablet 11/12/2018 Active   lovastatin (MEVACOR) 20 MG Tab  11/12/2018 Active   meloxicam (MOBIC) 7.5 MG Tab 11/12/2018 Active   metformin (GLUCOPHAGE) 1000 MG tablet 11/12/2018 Active   SAXagliptin HCl 5 MG Tab 11/12/2018 Active                ALLERGIES  No Known Allergies    PHYSICAL EXAM  VITAL SIGNS: BP (!) 163/105   Pulse 82   Temp 36.2 °C (97.1 °F)   Resp 18   Ht 1.829 m (6')   Wt 111.5 kg (245 lb 13 oz)   SpO2 94%   BMI 33.34 kg/m²       Constitutional: Well developed, Well nourished, No acute distress, Non-toxic appearance.   HENT: Normocephalic, Atraumatic, Bilateral external ears normal, Oropharynx moist, No oral exudates, Nose normal.   Eyes: PERRLA, EOMI, Conjunctiva normal, No discharge.   Neck: Normal range of motion, No tenderness, Supple, No stridor.   Cardiovascular: Regular rate and rhythm no murmurs rubs or gallops  Thorax & Lungs: There to auscultation bilaterally without wheezes rales or rhonchi  Abdomen: Bowel sounds normal, Soft, No tenderness, No masses, No pulsatile masses.   Skin: Warm, Dry, No erythema, No rash.   Back: No tenderness, No CVA tenderness.   Extremities: Intact distal pulses, No tenderness, No cyanosis, No clubbing.  It of edema negative ccording negative Homans sign  Neurologic: Alert & oriented x 3, Normal motor function, Normal sensory function, No focal deficits noted.       RADIOLOGY/PROCEDURES  CT-CTA CHEST PULMONARY ARTERY W/ RECONS   Final Result      No CT evidence of pulmonary thromboembolism      Segmental right lower lobe atelectasis      Severe hepatic steatosis      DX-CHEST-PORTABLE (1 VIEW)   Final Result      No radiographic evidence of acute cardiopulmonary process.            COURSE & MEDICAL DECISION MAKING  Pertinent Labs & Imaging studies reviewed. (See chart for details)  Differential diagnosis: Pulmonary embolus, cardiac ischemia, pneumonia, atypical chest pain, fluid overload    Results for orders placed or performed during the hospital encounter of 11/12/18   CBC with Differential   Result Value Ref  Range    WBC 4.9 4.8 - 10.8 K/uL    RBC 5.31 4.70 - 6.10 M/uL    Hemoglobin 16.8 14.0 - 18.0 g/dL    Hematocrit 47.8 42.0 - 52.0 %    MCV 90.0 81.4 - 97.8 fL    MCH 31.6 27.0 - 33.0 pg    MCHC 35.1 33.7 - 35.3 g/dL    RDW 40.9 35.9 - 50.0 fL    Platelet Count 255 164 - 446 K/uL    MPV 9.9 9.0 - 12.9 fL    Neutrophils-Polys 43.80 (L) 44.00 - 72.00 %    Lymphocytes 40.60 22.00 - 41.00 %    Monocytes 8.20 0.00 - 13.40 %    Eosinophils 6.40 0.00 - 6.90 %    Basophils 0.60 0.00 - 1.80 %    Immature Granulocytes 0.40 0.00 - 0.90 %    Nucleated RBC 0.00 /100 WBC    Neutrophils (Absolute) 2.12 1.82 - 7.42 K/uL    Lymphs (Absolute) 1.97 1.00 - 4.80 K/uL    Monos (Absolute) 0.40 0.00 - 0.85 K/uL    Eos (Absolute) 0.31 0.00 - 0.51 K/uL    Baso (Absolute) 0.03 0.00 - 0.12 K/uL    Immature Granulocytes (abs) 0.02 0.00 - 0.11 K/uL    NRBC (Absolute) 0.00 K/uL   Complete Metabolic Panel (CMP)   Result Value Ref Range    Sodium 135 135 - 145 mmol/L    Potassium 3.5 (L) 3.6 - 5.5 mmol/L    Chloride 103 96 - 112 mmol/L    Co2 25 20 - 33 mmol/L    Anion Gap 7.0 0.0 - 11.9    Glucose 158 (H) 65 - 99 mg/dL    Bun 19 8 - 22 mg/dL    Creatinine 0.90 0.50 - 1.40 mg/dL    Calcium 8.7 8.4 - 10.2 mg/dL    AST(SGOT) 27 12 - 45 U/L    ALT(SGPT) 41 2 - 50 U/L    Alkaline Phosphatase 38 30 - 99 U/L    Total Bilirubin 1.2 0.1 - 1.5 mg/dL    Albumin 4.3 3.2 - 4.9 g/dL    Total Protein 7.2 6.0 - 8.2 g/dL    Globulin 2.9 1.9 - 3.5 g/dL    A-G Ratio 1.5 g/dL   Btype Natriuretic Peptide (BNP)   Result Value Ref Range    B Natriuretic Peptide 18 0 - 100 pg/mL   Prothrombin Time (PT/INR)   Result Value Ref Range    PT 12.0 12.0 - 14.6 sec    INR 0.89 0.87 - 1.13   APTT   Result Value Ref Range    APTT 24.8 24.7 - 36.0 sec   Lipase   Result Value Ref Range    Lipase 43 7 - 58 U/L   Troponin STAT   Result Value Ref Range    Troponin I <0.02 0.00 - 0.04 ng/mL   Magnesium   Result Value Ref Range    Magnesium 2.2 1.5 - 2.5 mg/dL   Phosphorus   Result Value  Ref Range    Phosphorus 2.1 (L) 2.5 - 4.5 mg/dL   TSH (for screening thyroid dysfunction)   Result Value Ref Range    TSH 0.820 0.380 - 5.330 uIU/mL   Free Thyroxine (add to TSH for patients with existing thyroid dysfunction)   Result Value Ref Range    Free T-4 1.12 0.58 - 1.64 ng/dL   ESTIMATED GFR   Result Value Ref Range    GFR If African American >60 >60 mL/min/1.73 m 2    GFR If Non African American >60 >60 mL/min/1.73 m 2   EKG   Result Value Ref Range    Report       [facility]    Test Date:  2018  Pt Name:    ANNMARIE FONTANA                   Department: [facility]  MRN:        [id number]                      [facility]  Gender:     Male                         Technician: [id number]  :        1970                   Requested By:ER TRIAGE PROTOCOL  Order #:    [id number]                    Reading MD: DAVID BUCK MD    Measurements  Intervals                                Axis  Rate:       64                           P:          19  CA:         176                          QRS:        258  QRSD:       106                          T:          44  QT:         412  QTc:        425    Interpretive Statements  SINUS RHYTHM  LEFT ANTERIOR FASCICULAR BLOCK  LATE PRECORDIAL R/S TRANSITION  BORDERLINE T WAVE ABNORMALITIES  Compared to ECG 10/31/2018 16:23:53  Left anterior fascicular block now present  T-wave abnormality now present  Left-axis deviation no longer present  Poor R-wave progression no longer  present    Electronically Signed On 2018 10:18:56 PST by DAVID BUCK MD         12:46 PM currently the patient is asymptomatic but he will still has PND and orthopnea with some chest pressure.  He will be admitted for a nuclear stress test and further evaluation.  The patient understands that so far his workup is negative and understands his need to stay.    12:46 PM I spoke with Dr. Pina who will admit the patient for further workup.        FINAL  IMPRESSION  1. Orthopnea    2. Chest pain, unspecified type            Electronically signed by: Shana Perkins, 11/12/2018 10:20 AM

## 2018-11-12 NOTE — H&P
Hospital Medicine History and Physical    Date of Service  11/12/2018    Chief Complaint  Chief Complaint   Patient presents with   • Shortness of Breath     For past 4 days. Denies CP. Denies recent illness. Pt post-op rotator cuff sx 1 week ago.        History of Presenting Illness  48 y.o. male with a past medical history of Type II DM presented 11/12/2018 to the ER for evaluation of intermittent chest pain that started about 4-5 days ago, patient 1 week ago underwent a rotator cuff repair, after this procedure patient says that he is having these pressure-like chest sensations all across his chest though seated with shortness of breath especially when he lies flat.  He denies having any lower extremity swelling denies any exertional dyspnea.  He is able to walk without being short of breath.  He denies having productive cough.  Patient says he does not feel right, he initially thought that this was a medication reaction from his oxycodones however feels like it may be something more.    In ER patient underwent an EKG as well as a troponin which was grossly negative for acute ACS.  However with his risk factors of diabetes patient will be admitted for a nuclear stress test for stratification.                  Primary Care Physician  Nayeli Adam M.D.    Consultants  None    Code Status  Code: Full code    Review of Systems  Review of Systems   Constitutional: Negative for chills, fever and malaise/fatigue.   HENT: Negative for congestion, hearing loss, sore throat and tinnitus.    Eyes: Negative for blurred vision, double vision, photophobia and pain.   Respiratory: Positive for shortness of breath. Negative for cough, hemoptysis, sputum production, wheezing and stridor.    Cardiovascular: Positive for chest pain and PND. Negative for palpitations, orthopnea, claudication and leg swelling.   Gastrointestinal: Negative for blood in stool, constipation, heartburn, melena, nausea and vomiting.   Genitourinary:  Negative for dysuria, frequency and urgency.   Musculoskeletal: Negative for back pain, myalgias and neck pain.   Neurological: Negative for dizziness, tingling, tremors, sensory change, speech change, weakness and headaches.   Psychiatric/Behavioral: Negative for depression, memory loss and suicidal ideas. The patient is not nervous/anxious.           Past Medical History  Past Medical History:   Diagnosis Date   • Diabetes (HCC) 10/2018    oral meds   • Pain 10/2018    Left quad   • Arthritis     osteo-back   • High cholesterol    • Hypertension        Surgical History  Past Surgical History:   Procedure Laterality Date   • SHOULDER ARTHROSCOPY Right 11/5/2018    Procedure: SHOULDER ARTHROSCOPY;  Surgeon: Wesly Weinstein M.D.;  Location: [facility];  Service: Orthopedics   • SHOULDER DECOMPRESSION Right 11/5/2018    Procedure: SHOULDER DECOMPRESSION-  W/MINI OPEN SUBACROMIAL;  Surgeon: Wesly Weinstein M.D.;  Location: [facility];  Service: Orthopedics   • CLAVICLE DISTAL EXCISION Right 11/5/2018    Procedure: CLAVICLE DISTAL EXCISION, LATERAL CORACOID DECOMPRESSION;  Surgeon: Wesly Weinstein M.D.;  Location: [facility];  Service: Orthopedics   • SHOULDER ARTHROSCOPY W/ BICIPITAL TENODESIS REPAIR Right 11/5/2018    Procedure: SHOULDER ARTHROSCOPY W/ BICIPITAL TENODESIS REPAIR;  Surgeon: Wesly Weinstein M.D.;  Location: [facility];  Service: Orthopedics   • ELBOW ORIF Left 1980   • CHOLECYSTECTOMY      approx 2004       Medications  No current facility-administered medications on file prior to encounter.      Current Outpatient Prescriptions on File Prior to Encounter   Medication Sig Dispense Refill   • SAXagliptin HCl 5 MG Tab Take 5 mg by mouth every day.     • glipiZIDE (GLUCOTROL) 10 MG Tab Take 10 mg by mouth every day.     • Empagliflozin (JARDIANCE) 25 MG Tab Jardiance 25 mg tablet     • losartan-hydrochlorothiazide (HYZAAR) 100-25 MG per tablet  losartan 100 mg-hydrochlorothiazide 25 mg tablet     • lovastatin (MEVACOR) 20 MG Tab lovastatin 20 mg tablet     • metformin (GLUCOPHAGE) 1000 MG tablet metformin 1,000 mg tablet         Family History  Family History   Problem Relation Age of Onset   • Diabetes Mother    • Diabetes Father    • Hypertension Father    • Diabetes Brother        Social History  Social History   Substance Use Topics   • Smoking status: Never Smoker   • Smokeless tobacco: Former User   • Alcohol use Yes      Comment: 2 per month       Allergies  No Known Allergies     Physical Exam  Laboratory   Hemodynamics  Temp (24hrs), Av.4 °C (97.5 °F), Min:36.2 °C (97.1 °F), Max:36.6 °C (97.8 °F)   Temperature: 36.6 °C (97.8 °F)  Pulse  Av  Min: 71  Max: 83 Heart Rate (Monitored): 79  Blood Pressure: (!) 175/104, NIBP: 149/112      Respiratory      Respiration: 17, Pulse Oximetry: 95 %        RUL Breath Sounds: Clear, RML Breath Sounds: Clear, RLL Breath Sounds: Clear, KING Breath Sounds: Clear, LLL Breath Sounds: Clear    Physical Exam   Constitutional: He is oriented to person, place, and time. He appears well-developed and well-nourished. No distress.   HENT:   Head: Normocephalic and atraumatic.   Mouth/Throat: No oropharyngeal exudate.   Eyes: Pupils are equal, round, and reactive to light. Conjunctivae are normal. Right eye exhibits no discharge. No scleral icterus.   Neck: Neck supple. No JVD present. No thyromegaly present.   Cardiovascular: Normal rate and intact distal pulses.    No murmur heard.  Pulses:       Dorsalis pedis pulses are 2+ on the right side, and 2+ on the left side.   Cap refill < 3 s   Pulmonary/Chest: Effort normal and breath sounds normal. No stridor. No respiratory distress. He has no wheezes. He has no rales.   Abdominal: Soft. Bowel sounds are normal. He exhibits no distension. There is no tenderness. There is no rebound.   Musculoskeletal: Normal range of motion. He exhibits no edema.   Neurological: He is  alert and oriented to person, place, and time. No cranial nerve deficit.   Skin: Skin is warm. He is not diaphoretic. No erythema.   Psychiatric: He has a normal mood and affect. His behavior is normal. Thought content normal.   Nursing note and vitals reviewed.        Assessment/Plan  * Chest pain   Assessment & Plan    We will ruled out cardiac etiology  Initial EKG and cardiac troponin negative, will continue to trend x2 if negative will proceed with a nuclear stress test for stratification  We will provide patient with aspirin and statin for cardiac protective measures.  TSH ordered  Lipid panel ordered     High cholesterol   Assessment & Plan    Resume lovastatin  Check lipid panel     Essential hypertension- (present on admission)   Assessment & Plan    Controlled  Resume home dose of HCTZ-Losartan combo     Controlled type 2 diabetes mellitus without complication, without long-term current use of insulin (HCC)- (present on admission)   Assessment & Plan    Controlled last A1c 1 month ago was 7.7  Continue Insulin-sliding scale, accu-checks and hypoglycemia protocol.  We have held his home dose of metformin, saxagliptin and Empagliflozin         I anticipate this patient is appropriate for observation status at this time.    Prophylaxis: lovenox    Recent Labs      11/12/18   1100   WBC  4.9   RBC  5.31   HEMOGLOBIN  16.8   HEMATOCRIT  47.8   MCV  90.0   MCH  31.6   MCHC  35.1   RDW  40.9   PLATELETCT  255   MPV  9.9     Recent Labs      11/12/18   1100   SODIUM  135   POTASSIUM  3.5*   CHLORIDE  103   CO2  25   GLUCOSE  158*   BUN  19   CREATININE  0.90   CALCIUM  8.7     Recent Labs      11/12/18   1100   ALTSGPT  41   ASTSGOT  27   ALKPHOSPHAT  38   TBILIRUBIN  1.2   LIPASE  43   GLUCOSE  158*     Recent Labs      11/12/18   1100   APTT  24.8   INR  0.89     Recent Labs      11/12/18   1100   BNPBTYPENAT  18         Lab Results   Component Value Date    TROPONINI <0.02 11/12/2018       Imaging  CT-CTA CHEST  PULMONARY ARTERY W/ RECONS   Final Result      No CT evidence of pulmonary thromboembolism      Segmental right lower lobe atelectasis      Severe hepatic steatosis      DX-CHEST-PORTABLE (1 VIEW)   Final Result      No radiographic evidence of acute cardiopulmonary process.      NM-CARDIAC STRESS TEST    (Results Pending)

## 2018-11-12 NOTE — ED NOTES
Bed assmt obtained. Receiving RN currently receiving report for another pt. Will call back shortly.

## 2018-11-12 NOTE — PROGRESS NOTES
2 RN skin assessment done with Navya KHAN; skin not WDL.     Surgical incision from a week ago on right shoulder area.

## 2018-11-12 NOTE — CARE PLAN
Problem: Safety  Goal: Will remain free from injury  Outcome: PROGRESSING AS EXPECTED  Pt is steady on feet. Discussed with patient to call for assistance if feeling SOB when wanting to ambulate.     Problem: Knowledge Deficit  Goal: Knowledge of disease process/condition, treatment plan, diagnostic tests, and medications will improve  Outcome: PROGRESSING AS EXPECTED  Discussed plan of care with patient and educated pt on stress test. All questions answered.

## 2018-11-12 NOTE — ASSESSMENT & PLAN NOTE
We will ruled out cardiac etiology  Initial EKG and cardiac troponin negative, will continue to trend x2 if negative will proceed with a nuclear stress test for stratification  We will provide patient with aspirin and statin for cardiac protective measures.  TSH ordered  Lipid panel ordered

## 2018-11-12 NOTE — ED NOTES
Pt sitting in gurney, denies any new or worsening symptoms since arrival. Call bell use reinforced.

## 2018-11-13 ENCOUNTER — APPOINTMENT (OUTPATIENT)
Dept: RADIOLOGY | Facility: MEDICAL CENTER | Age: 48
End: 2018-11-13
Attending: HOSPITALIST
Payer: COMMERCIAL

## 2018-11-13 ENCOUNTER — PATIENT OUTREACH (OUTPATIENT)
Dept: HEALTH INFORMATION MANAGEMENT | Facility: OTHER | Age: 48
End: 2018-11-13

## 2018-11-13 ENCOUNTER — APPOINTMENT (OUTPATIENT)
Dept: CARDIOLOGY | Facility: MEDICAL CENTER | Age: 48
End: 2018-11-13
Attending: HOSPITALIST
Payer: COMMERCIAL

## 2018-11-13 VITALS
BODY MASS INDEX: 33.86 KG/M2 | OXYGEN SATURATION: 93 % | RESPIRATION RATE: 16 BRPM | DIASTOLIC BLOOD PRESSURE: 86 MMHG | TEMPERATURE: 98.2 F | HEIGHT: 72 IN | HEART RATE: 61 BPM | WEIGHT: 250 LBS | SYSTOLIC BLOOD PRESSURE: 132 MMHG

## 2018-11-13 LAB
ALBUMIN SERPL BCP-MCNC: 3.9 G/DL (ref 3.2–4.9)
ALBUMIN/GLOB SERPL: 1.6 G/DL
ALP SERPL-CCNC: 33 U/L (ref 30–99)
ALT SERPL-CCNC: 36 U/L (ref 2–50)
ANION GAP SERPL CALC-SCNC: 6 MMOL/L (ref 0–11.9)
AST SERPL-CCNC: 20 U/L (ref 12–45)
BASOPHILS # BLD AUTO: 0.5 % (ref 0–1.8)
BASOPHILS # BLD: 0.03 K/UL (ref 0–0.12)
BILIRUB SERPL-MCNC: 1.5 MG/DL (ref 0.1–1.5)
BUN SERPL-MCNC: 17 MG/DL (ref 8–22)
CALCIUM SERPL-MCNC: 8.6 MG/DL (ref 8.4–10.2)
CHLORIDE SERPL-SCNC: 107 MMOL/L (ref 96–112)
CHOLEST SERPL-MCNC: 106 MG/DL (ref 100–199)
CO2 SERPL-SCNC: 24 MMOL/L (ref 20–33)
CREAT SERPL-MCNC: 0.89 MG/DL (ref 0.5–1.4)
EOSINOPHIL # BLD AUTO: 0.37 K/UL (ref 0–0.51)
EOSINOPHIL NFR BLD: 6.5 % (ref 0–6.9)
ERYTHROCYTE [DISTWIDTH] IN BLOOD BY AUTOMATED COUNT: 41.3 FL (ref 35.9–50)
GLOBULIN SER CALC-MCNC: 2.5 G/DL (ref 1.9–3.5)
GLUCOSE BLD-MCNC: 144 MG/DL (ref 65–99)
GLUCOSE SERPL-MCNC: 158 MG/DL (ref 65–99)
HCT VFR BLD AUTO: 47.1 % (ref 42–52)
HDLC SERPL-MCNC: 28 MG/DL
HGB BLD-MCNC: 16.5 G/DL (ref 14–18)
IMM GRANULOCYTES # BLD AUTO: 0.03 K/UL (ref 0–0.11)
IMM GRANULOCYTES NFR BLD AUTO: 0.5 % (ref 0–0.9)
LDLC SERPL CALC-MCNC: 30 MG/DL
LV EJECT FRACT  99904: 60
LYMPHOCYTES # BLD AUTO: 2.11 K/UL (ref 1–4.8)
LYMPHOCYTES NFR BLD: 36.8 % (ref 22–41)
MCH RBC QN AUTO: 31.8 PG (ref 27–33)
MCHC RBC AUTO-ENTMCNC: 35 G/DL (ref 33.7–35.3)
MCV RBC AUTO: 90.8 FL (ref 81.4–97.8)
MONOCYTES # BLD AUTO: 0.55 K/UL (ref 0–0.85)
MONOCYTES NFR BLD AUTO: 9.6 % (ref 0–13.4)
NEUTROPHILS # BLD AUTO: 2.64 K/UL (ref 1.82–7.42)
NEUTROPHILS NFR BLD: 46.1 % (ref 44–72)
NRBC # BLD AUTO: 0 K/UL
NRBC BLD-RTO: 0 /100 WBC
PLATELET # BLD AUTO: 241 K/UL (ref 164–446)
PMV BLD AUTO: 9.8 FL (ref 9–12.9)
POTASSIUM SERPL-SCNC: 3.3 MMOL/L (ref 3.6–5.5)
PROT SERPL-MCNC: 6.4 G/DL (ref 6–8.2)
RBC # BLD AUTO: 5.19 M/UL (ref 4.7–6.1)
SODIUM SERPL-SCNC: 137 MMOL/L (ref 135–145)
TRIGL SERPL-MCNC: 242 MG/DL (ref 0–149)
WBC # BLD AUTO: 5.7 K/UL (ref 4.8–10.8)

## 2018-11-13 PROCEDURE — 82962 GLUCOSE BLOOD TEST: CPT

## 2018-11-13 PROCEDURE — A9270 NON-COVERED ITEM OR SERVICE: HCPCS | Performed by: HOSPITALIST

## 2018-11-13 PROCEDURE — A9502 TC99M TETROFOSMIN: HCPCS

## 2018-11-13 PROCEDURE — 700111 HCHG RX REV CODE 636 W/ 250 OVERRIDE (IP)

## 2018-11-13 PROCEDURE — 93306 TTE W/DOPPLER COMPLETE: CPT

## 2018-11-13 PROCEDURE — G0378 HOSPITAL OBSERVATION PER HR: HCPCS

## 2018-11-13 PROCEDURE — 93306 TTE W/DOPPLER COMPLETE: CPT | Mod: 26 | Performed by: INTERNAL MEDICINE

## 2018-11-13 PROCEDURE — 80061 LIPID PANEL: CPT

## 2018-11-13 PROCEDURE — 93018 CV STRESS TEST I&R ONLY: CPT | Performed by: INTERNAL MEDICINE

## 2018-11-13 PROCEDURE — 80053 COMPREHEN METABOLIC PANEL: CPT

## 2018-11-13 PROCEDURE — 99217 PR OBSERVATION CARE DISCHARGE: CPT | Performed by: HOSPITALIST

## 2018-11-13 PROCEDURE — 700102 HCHG RX REV CODE 250 W/ 637 OVERRIDE(OP): Performed by: HOSPITALIST

## 2018-11-13 PROCEDURE — 85025 COMPLETE CBC W/AUTO DIFF WBC: CPT

## 2018-11-13 PROCEDURE — 78452 HT MUSCLE IMAGE SPECT MULT: CPT | Mod: 26 | Performed by: INTERNAL MEDICINE

## 2018-11-13 RX ORDER — REGADENOSON 0.08 MG/ML
INJECTION, SOLUTION INTRAVENOUS
Status: COMPLETED
Start: 2018-11-13 | End: 2018-11-13

## 2018-11-13 RX ADMIN — HYDROCHLOROTHIAZIDE 25 MG: 25 TABLET ORAL at 04:41

## 2018-11-13 RX ADMIN — ASPIRIN 325 MG ORAL TABLET 325 MG: 325 PILL ORAL at 04:40

## 2018-11-13 RX ADMIN — OXYCODONE HYDROCHLORIDE 2.5 MG: 5 TABLET ORAL at 04:43

## 2018-11-13 RX ADMIN — REGADENOSON 0.4 MG: 0.08 INJECTION, SOLUTION INTRAVENOUS at 09:32

## 2018-11-13 RX ADMIN — LOSARTAN POTASSIUM 100 MG: 25 TABLET, FILM COATED ORAL at 04:41

## 2018-11-13 ASSESSMENT — PAIN SCALES - GENERAL
PAINLEVEL_OUTOF10: 3
PAINLEVEL_OUTOF10: 5

## 2018-11-13 NOTE — PROGRESS NOTES
Assessment completed: PT is A&Ox4, ambulatory, sling immobilizer in place to right shoulder. Per pt he is NWB in right arm due to surgery last week R/T rt bicep/rotator cuff repair. LS clear, BS active x 4, states pain 4/10 to rt shoulder, decreasing following med admin on previous shift. FSBS noted 251, covered with 5 unit regular insulin. Pt educated on s/s of hypoglycemia, verbalized understanding. Pt aware he is NPO after MN prior to NM stress test in the AM. Call bell at bedside, will continue to monitor.

## 2018-11-13 NOTE — CARE PLAN
Problem: Communication  Goal: The ability to communicate needs accurately and effectively will improve  Outcome: PROGRESSING AS EXPECTED  Patient oriented to room environment, uses call bell appropriately.    Problem: Pain Management  Goal: Pain level will decrease to patient's comfort goal  Outcome: PROGRESSING AS EXPECTED  Patient with decrease in pain following PRN pain med administration.

## 2018-11-13 NOTE — PROGRESS NOTES
Tele strip at 1854 shows SR at 81.       Measurements from am strip were as follows:  KS=0.18  QRS=0.10  QT=0.38     Tele Shift Summary:     Rhythm : SR/ST  Rate : 60's-100;s  Ectopy : Per CCT Jami, pt had rare PVCs.      Telemetry monitoring strips placed in pt's chart.

## 2018-11-13 NOTE — PROGRESS NOTES
Report received from rufus Lepe awake asking when his test would be.  Pt had been npo since midnight except to take medications with sips of water.      Followed up about 0805 with pt to let him know that his stress test would be at 0830.  When asking what brought him in, he complains of sob with changing position from lying to sitting and when drinking for about 6 days since his surgery.  Sling in place correctly with +cms.  Pt denies pain in his chest, but more at his surgical site.  VSS.

## 2018-11-13 NOTE — DISCHARGE SUMMARY
Discharge Summary    CHIEF COMPLAINT ON ADMISSION  Chief Complaint   Patient presents with   • Shortness of Breath     For past 4 days. Denies CP. Denies recent illness. Pt post-op rotator cuff sx 1 week ago.        Reason for Admission  post surgery x1 week; SOB x3 days     Admission Date  11/12/2018    CODE STATUS  Full Code    HPI & HOSPITAL COURSE  This is a 48 y.o. male here with dyspnea. He had a recent shoulder surgery and had noted that he was dyspneic when laying flat. He was admitted and had an entirely normal workup. This included a CTA for PE, echocardiogram, stress test and labs. He is not hypoxic. The etiology of his dyspnea is not clear but may be from a recent thigh strain followed by shoulder surgery and pain meds. He will continue with activity and physical therapy.        Therefore, he is discharged in good and stable condition to home with close outpatient follow-up.        Discharge Date  11/13/18    FOLLOW UP ITEMS POST DISCHARGE  none    DISCHARGE DIAGNOSES  Principal Problem:    Chest pain POA: Unknown  Active Problems:    Controlled type 2 diabetes mellitus without complication, without long-term current use of insulin (HCC) POA: Yes    Essential hypertension POA: Yes    High cholesterol POA: Unknown  Resolved Problems:    * No resolved hospital problems. *      FOLLOW UP  Future Appointments  Date Time Provider Department Center   11/20/2018 3:00 PM TIERA [facility]   4/9/2019 9:20 AM [facility]  [address]  [address] [place] [address]  [phone]            MEDICATIONS ON DISCHARGE     Medication List      CONTINUE taking these medications      Instructions   aspirin 325 MG Tabs  Commonly known as:  ASA   Take 325 mg by mouth every day.  Dose:  325 mg     glipiZIDE 10 MG Tabs  Commonly known as:  GLUCOTROL   Take 10 mg by mouth every day.  Dose:  10 mg     HYDROcodone-acetaminophen 5-325 MG Tabs per  tablet  Commonly known as:  NORCO   Take 1-2 Tabs by mouth every four hours as needed.  Dose:  1-2 Tab     JARDIANCE 25 MG Tabs  Generic drug:  Empagliflozin   Jardiance 25 mg tablet     losartan-hydrochlorothiazide 100-25 MG per tablet  Commonly known as:  HYZAAR   losartan 100 mg-hydrochlorothiazide 25 mg tablet     lovastatin 20 MG Tabs  Commonly known as:  MEVACOR   lovastatin 20 mg tablet     meloxicam 7.5 MG Tabs  Commonly known as:  MOBIC   Take 7.5 mg by mouth every day.  Dose:  7.5 mg     metformin 1000 MG tablet  Commonly known as:  GLUCOPHAGE   metformin 1,000 mg tablet     SAXagliptin HCl 5 MG Tabs   Take 5 mg by mouth every day.  Dose:  5 mg            Allergies  No Known Allergies    DIET  Orders Placed This Encounter   Procedures   • Diet Order Regular     Standing Status:   Standing     Number of Occurrences:   1     Order Specific Question:   Diet:     Answer:   Regular [1]     Order Specific Question:   Miscellaneous modifications:     Answer:   No Decaf, No Caffeine(for test) [11]     Comments:   Protocol 1313 Patient to have no caffeine for 12 hours prior to exam (decaf, coffee, cola, tea, chocolate)       ACTIVITY  As tolerated.  Weight bearing as tolerated    CONSULTATIONS  none    PROCEDURES  none    LABORATORY  Lab Results   Component Value Date    SODIUM 137 11/13/2018    POTASSIUM 3.3 (L) 11/13/2018    CHLORIDE 107 11/13/2018    CO2 24 11/13/2018    GLUCOSE 158 (H) 11/13/2018    BUN 17 11/13/2018    CREATININE 0.89 11/13/2018        Lab Results   Component Value Date    WBC 5.7 11/13/2018    HEMOGLOBIN 16.5 11/13/2018    HEMATOCRIT 47.1 11/13/2018    PLATELETCT 241 11/13/2018

## 2018-11-13 NOTE — DISCHARGE INSTRUCTIONS
Discharge Instructions    Discharged to home by car with relative. Discharged via wheelchair, hospital escort: Yes.  Special equipment needed: Not Applicable    Be sure to schedule a follow-up appointment with your primary care doctor or any specialists as instructed.     Discharge Plan:   Pneumococcal Vaccine Administered/Refused: Not given - Patient refused pneumococcal vaccine  Influenza Vaccine Indication: Not indicated: Previously immunized this influenza season and > 8 years of age    I understand that a diet low in cholesterol, fat, and sodium is recommended for good health. Unless I have been given specific instructions below for another diet, I accept this instruction as my diet prescription.   Other diet: keep yourself hydrated.    Special Instructions:    Shortness of Breath  Shortness of breath means you have trouble breathing. Shortness of breath needs medical care right away.  HOME CARE   · Do not smoke.  · Avoid being around chemicals or things (paint fumes, dust) that may bother your breathing.  · Rest as needed. Slowly begin your normal activities.  · Only take medicines as told by your doctor.  · Keep all doctor visits as told.  GET HELP RIGHT AWAY IF:   · Your shortness of breath gets worse.  · You feel lightheaded, pass out (faint), or have a cough that is not helped by medicine.  · You cough up blood.  · You have pain with breathing.  · You have pain in your chest, arms, shoulders, or belly (abdomen).  · You have a fever.  · You cannot walk up stairs or exercise the way you normally do.  · You do not get better in the time expected.  · You have a hard time doing normal activities even with rest.  · You have problems with your medicines.  · You have any new symptoms.  MAKE SURE YOU:  · Understand these instructions.  · Will watch your condition.  · Will get help right away if you are not doing well or get worse.  This information is not intended to replace advice given to you by your health care  provider. Make sure you discuss any questions you have with your health care provider.  Document Released: 06/05/2009 Document Revised: 12/23/2014 Document Reviewed: 03/04/2013  [employer] Interactive Patient Education © 2017 [employer]    · Is patient discharged on Warfarin / Coumadin?   No     Depression / Suicide Risk    As you are discharged from this [facility] facility, it is important to learn how to keep safe from harming yourself.    Recognize the warning signs:  · Abrupt changes in personality, positive or negative- including increase in energy   · Giving away possessions  · Change in eating patterns- significant weight changes-  positive or negative  · Change in sleeping patterns- unable to sleep or sleeping all the time   · Unwillingness or inability to communicate  · Depression  · Unusual sadness, discouragement and loneliness  · Talk of wanting to die  · Neglect of personal appearance   · Rebelliousness- reckless behavior  · Withdrawal from people/activities they love  · Confusion- inability to concentrate     If you or a loved one observes any of these behaviors or has concerns about self-harm, here's what you can do:  · Talk about it- your feelings and reasons for harming yourself  · Remove any means that you might use to hurt yourself (examples: pills, rope, extension cords, firearm)  · Get professional help from the community (Mental Health, Substance Abuse, psychological counseling)  · Do not be alone:Call your Safe Contact- someone whom you trust who will be there for you.  · Call your local CRISIS HOTLINE 592-4386 or 843-178-5713  · Call your local Children's Mobile Crisis Response Team [place] (951) 470-7227 or www.[employer]  · Call the toll free National Suicide Prevention Hotlines   · National Suicide Prevention Lifeline 411-291-BWYM (1320)  · National Hope Line Network 800-SUICIDE (902-8107)

## 2018-11-13 NOTE — PROGRESS NOTES
Nursing care plan includes knowledge deficit, potential for discomfort, potential for compromised cardiac output. POC includes teaching, comfort measures and reassurance, and access to code cart, cardiology stand by and availability of rapid response team. Pt verbalizes good understanding of benefits and risks of pharmacological cardiac stress test. Informed consent obtained. Lexiscan given, pt developed the following symptoms SOB and headache and tingling shoulders. VS stable, major symptoms resolved. To waiting room, caffeinated fluids and/or snacks given, awaiting second scan. Nursing goals met. Will return to room after second scan.

## 2018-11-13 NOTE — PROGRESS NOTES
Patient reporting 7/10 right shoulder pain, PRN pain meds given per order. Pt up self to BR without difficulty. Will continue to monitor.

## 2018-11-14 ENCOUNTER — PATIENT OUTREACH (OUTPATIENT)
Dept: HEALTH INFORMATION MANAGEMENT | Facility: OTHER | Age: 48
End: 2018-11-14

## 2018-11-14 NOTE — PROGRESS NOTES
Pt returned from his stress test about 1030.  Pt had eaten more than half of his early lunch tray and fs not taken.  Test results back and Dr. Flores discussed them with the patient.  Discharging Patient home per physician order.  Discharged with friend to home at 1350.  Demonstrated understanding of discharge instructions, follow up appointments, home medications, no prescription, and nursing care instructions for chest pain.  Ambulating without assistance, voiding without difficulty, pain well controlled, tolerating oral medications, oxygen saturation greater than 90% , tolerating diet.   Educational handouts given and discussed.  Verbalized understanding of discharge instructions and educational handouts.  All questions answered.  Belongings with patient at time of discharge.

## 2018-11-20 ENCOUNTER — OFFICE VISIT (OUTPATIENT)
Dept: MEDICAL GROUP | Facility: MEDICAL CENTER | Age: 48
End: 2018-11-20
Payer: COMMERCIAL

## 2018-11-20 VITALS
HEIGHT: 71 IN | RESPIRATION RATE: 16 BRPM | OXYGEN SATURATION: 94 % | HEART RATE: 88 BPM | BODY MASS INDEX: 34.24 KG/M2 | WEIGHT: 244.6 LBS | DIASTOLIC BLOOD PRESSURE: 96 MMHG | TEMPERATURE: 97.1 F | SYSTOLIC BLOOD PRESSURE: 142 MMHG

## 2018-11-20 DIAGNOSIS — R07.1 CHEST PAIN ON BREATHING: ICD-10-CM

## 2018-11-20 DIAGNOSIS — S76.112D QUADRICEPS MUSCLE RUPTURE, LEFT, SUBSEQUENT ENCOUNTER: ICD-10-CM

## 2018-11-20 DIAGNOSIS — M62.559 ATROPHY OF QUADRICEPS FEMORIS MUSCLE: ICD-10-CM

## 2018-11-20 PROCEDURE — 99214 OFFICE O/P EST MOD 30 MIN: CPT | Performed by: INTERNAL MEDICINE

## 2018-11-20 NOTE — LETTER
[facility]  Nayeli Adam M.D.  [address]  [facility] [address]  Fax: [phone]   Authorization for Release/Disclosure of   Protected Health Information   Name: MYRON LILLY : 1970 SSN: [id number]   Address: [address] [place] [id number] Phone:    [phone] (home)    I authorize the entity listed below to release/disclose the PHI below to:  SELF   Provider or Entity Name:  SELF   Address   City, State, Zip   Phone:      Fax:     Reason for request: continuity of care   Information to be released: MRI 10/3/18   [  ] LAST COLONOSCOPY,  including any PATH REPORT and follow-up  [  ] LAST FIT/COLOGUARD RESULT [  ] LAST DEXA  [  ] LAST MAMMOGRAM  [  ] LAST PAP  [  ] LAST LABS [  ] RETINA EXAM REPORT  [  ] IMMUNIZATION RECORDS  [  ] Release all info      [  ] Check here and initial the line next to each item to release ALL health information INCLUDING  _____ Care and treatment for drug and / or alcohol abuse  _____ HIV testing, infection status, or AIDS  _____ Genetic Testing    DATES OF SERVICE OR TIME PERIOD TO BE DISCLOSED: _____________  I understand and acknowledge that:  * This Authorization may be revoked at any time by you in writing, except if your health information has already been used or disclosed.  * Your health information that will be used or disclosed as a result of you signing this authorization could be re-disclosed by the recipient. If this occurs, your re-disclosed health information may no longer be protected by State or Federal laws.  * You may refuse to sign this Authorization. Your refusal will not affect your ability to obtain treatment.  * This Authorization becomes effective upon signing and will  on (date) __________.      If no date is indicated, this Authorization will  one (1) year from the signature date.    Name: Myron Lilly    Signature:   Date:     2018       PLEASE FAX REQUESTED RECORDS BACK TO: [phone]

## 2018-11-20 NOTE — PROGRESS NOTES
Chief Complaint   Patient presents with   • Hospital Follow-up     chest pain     Chief complaint: Hospital follow-up of chest pain workup, disability paperwork.    HISTORY OF PRESENT ILLNESS: Patient is a 48 y.o. male patient who presents today to discuss the evaluation and management of:          1. Chest pain on breathing    Patient was admitted to hospital 11/12/2018 for shortness of breath/heavy chest.  He had had rotator cuff surgery 5 days prior.  He had extensive workup including echocardiogram, nuclear stress test, CT chest angio, all was negative.  Symptoms were likely related to narcotic use, and his shoulder surgery and sling.  Cholesterol, blood sugar were controlled while in hospital.    2. Quadriceps muscle rupture, left, subsequent encounter    Patient ruptured left quadriceps tendon September 15, 2018.  He was active duty , since then he has been told that due to inability to surgically repair his tendon that he will be permanently disabled and unable to resume his normal level of activity.      3. Atrophy of quadriceps femoris muscle    Patient is complaining of grinding or clicking when he flexes and extends his knee.  He continues to go to physical therapy in order to strengthen the other 3 of his 4 quadricep muscles.        Patient Active Problem List    Diagnosis Date Noted   • Chest pain 11/12/2018     Priority: High   • High cholesterol 11/12/2018   • Atrophy of quadriceps femoris muscle 10/30/2018   • Quadriceps muscle rupture, left, subsequent encounter 10/09/2018   • Chronic right shoulder pain 07/03/2018   • Controlled type 2 diabetes mellitus without complication, without long-term current use of insulin ([facility]) 07/03/2018   • Essential hypertension 07/03/2018   • Pure hypercholesterolemia 07/03/2018   • Obesity (BMI 30-39.9) 07/03/2018        Allergies:Patient has no known allergies.    Current meds including changes today  Current Outpatient Prescriptions   Medication Sig  "Dispense Refill   • aspirin (ASA) 325 MG Tab Take 325 mg by mouth every day.     • meloxicam (MOBIC) 7.5 MG Tab Take 7.5 mg by mouth every day.     • SAXagliptin HCl 5 MG Tab Take 5 mg by mouth every day.     • glipiZIDE (GLUCOTROL) 10 MG Tab Take 10 mg by mouth every day.     • Empagliflozin (JARDIANCE) 25 MG Tab Jardiance 25 mg tablet     • losartan-hydrochlorothiazide (HYZAAR) 100-25 MG per tablet losartan 100 mg-hydrochlorothiazide 25 mg tablet     • lovastatin (MEVACOR) 20 MG Tab lovastatin 20 mg tablet     • metformin (GLUCOPHAGE) 1000 MG tablet metformin 1,000 mg tablet       No current facility-administered medications for this visit.      Social History   Substance Use Topics   • Smoking status: Never Smoker   • Smokeless tobacco: Former User   • Alcohol use Yes      Comment: 2 per month     Social History     Social History Narrative   • No narrative on file       Family History   Problem Relation Age of Onset   • Diabetes Mother    • Diabetes Father    • Hypertension Father    • Diabetes Brother        Review of Systems:  No chest pain, No shortness of breath, No dyspnea on exertion  Gastrointestinal ROS: No abdominal pain, No nausea, vomiting, diarrhea, or constipation        Exam:      Blood pressure 142/96, pulse 88, temperature 36.2 °C (97.1 °F), resp. rate 16, height 1.803 m (5' 11\"), weight 110.9 kg (244 lb 9.6 oz), SpO2 94 %.  General:  Well nourished, well developed male in NAD affect and mood within normal limits  Head is grossly normal.  Neck: Supple without adenopathy  Pulmonary: Clear to ausculation.  Normal effort. No rales, rhonchi, or wheezing.  Cardiovascular: Regular rate and rhythm without murmur.   Extremities: no clubbing, cyanosis, or edema.  Neuro: moves all extremities symmetrically.  Palpable clicking with knee flexion and extension on the left.    Please note that this dictation was created using voice recognition software. I have made every reasonable attempt to correct obvious " errors, but I expect that there are errors of grammar and possibly content that I did not discover before finalizing the note.    Assessment/Plan:  1. Chest pain on breathing  Negative workup, patient is off narcotics.      2. 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    3. 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    Followup: Patient has pre-existing follow-up in April 2019.

## 2018-12-20 ENCOUNTER — NON-PROVIDER VISIT (OUTPATIENT)
Dept: OCCUPATIONAL MEDICINE | Facility: CLINIC | Age: 48
End: 2018-12-20

## 2018-12-20 DIAGNOSIS — Z11.1 ENCOUNTER FOR PPD TEST: ICD-10-CM

## 2018-12-20 PROCEDURE — 86580 TB INTRADERMAL TEST: CPT | Performed by: PREVENTIVE MEDICINE

## 2018-12-23 ENCOUNTER — APPOINTMENT (OUTPATIENT)
Dept: URGENT CARE | Facility: CLINIC | Age: 48
End: 2018-12-23

## 2018-12-27 ENCOUNTER — OFFICE VISIT (OUTPATIENT)
Dept: MEDICAL GROUP | Facility: MEDICAL CENTER | Age: 48
End: 2018-12-27
Payer: COMMERCIAL

## 2018-12-27 VITALS
SYSTOLIC BLOOD PRESSURE: 152 MMHG | BODY MASS INDEX: 34.52 KG/M2 | RESPIRATION RATE: 16 BRPM | HEART RATE: 76 BPM | HEIGHT: 71 IN | TEMPERATURE: 98.2 F | WEIGHT: 246.6 LBS | DIASTOLIC BLOOD PRESSURE: 94 MMHG | OXYGEN SATURATION: 95 %

## 2018-12-27 DIAGNOSIS — S76.112D QUADRICEPS MUSCLE RUPTURE, LEFT, SUBSEQUENT ENCOUNTER: ICD-10-CM

## 2018-12-27 DIAGNOSIS — I10 ESSENTIAL HYPERTENSION: ICD-10-CM

## 2018-12-27 PROCEDURE — 99213 OFFICE O/P EST LOW 20 MIN: CPT | Performed by: INTERNAL MEDICINE

## 2018-12-27 NOTE — LETTER
December 27, 2018        Myron Lilly      To whom it may concern:    I saw Myron in my office today for follow-up of his ruptured left quadriceps tendon.  Patient continues to be significantly disabled due to weakness in his left leg.  He is unable to perform his usual work duties.  He should remain off of work completely pending the outcome of his upcoming disability judgment in January.            Sincerely,        Nayeli Adam MD

## 2018-12-28 NOTE — PROGRESS NOTES
Chief Complaint   Patient presents with   • Muscle Pain     quadriceps,discuss work letter        HISTORY OF PRESENT ILLNESS: Patient is a 48 y.o. male patient who presents today to discuss the evaluation and management of:          1. Quadriceps muscle rupture, left, subsequent encounter    Patient reinjured his ruptured quadricep muscle in September 2018.  MRI has confirmed this, and patient is followed by orthopedic surgery.  He was advised that there is no surgical repair possible.  Patient worked as a , and is unable to return to active duty.  He is requesting a letter stating this while he is awaiting the outcome of a disability judgment.    2. Essential hypertension    Patient's blood pressure has been running high recently due to all of the stress.  He currently is taking losartan-hydrochlorothiazide 10/25 mg daily.  His BP is high today.  He [facility], and diastolics have been in the 80s there.        Patient Active Problem List    Diagnosis Date Noted   • Chest pain 11/12/2018     Priority: High   • High cholesterol 11/12/2018   • Atrophy of quadriceps femoris muscle 10/30/2018   • Quadriceps muscle rupture, left, subsequent encounter 10/09/2018   • Chronic right shoulder pain 07/03/2018   • Controlled type 2 diabetes mellitus without complication, without long-term current use of insulin ([facility]) 07/03/2018   • Essential hypertension 07/03/2018   • Pure hypercholesterolemia 07/03/2018   • Obesity (BMI 30-39.9) 07/03/2018        Allergies:Patient has no known allergies.    Current meds including changes today  Current Outpatient Prescriptions   Medication Sig Dispense Refill   • aspirin (ASA) 325 MG Tab Take 325 mg by mouth every day.     • meloxicam (MOBIC) 7.5 MG Tab Take 7.5 mg by mouth every day.     • SAXagliptin HCl 5 MG Tab Take 5 mg by mouth every day.     • glipiZIDE (GLUCOTROL) 10 MG Tab Take 10 mg by mouth every  "day.     • Empagliflozin (JARDIANCE) 25 MG Tab Jardiance 25 mg tablet     • losartan-hydrochlorothiazide (HYZAAR) 100-25 MG per tablet losartan 100 mg-hydrochlorothiazide 25 mg tablet     • lovastatin (MEVACOR) 20 MG Tab lovastatin 20 mg tablet     • metformin (GLUCOPHAGE) 1000 MG tablet metformin 1,000 mg tablet       No current facility-administered medications for this visit.      Social History   Substance Use Topics   • Smoking status: Never Smoker   • Smokeless tobacco: Former User   • Alcohol use Yes      Comment: 2 per month     Social History     Social History Narrative   • No narrative on file       Family History   Problem Relation Age of Onset   • Diabetes Mother    • Diabetes Father    • Hypertension Father    • Diabetes Brother                Exam:      Blood pressure 152/94, pulse 76, temperature 36.8 °C (98.2 °F), temperature source Temporal, resp. rate 16, height 1.803 m (5' 11\"), weight 111.9 kg (246 lb 9.6 oz), SpO2 95 %.  General:  Well nourished, well developed male in NAD affect and mood within normal limits  Head is grossly normal.  Neck: Supple without adenopathy  Pulmonary: Clear to ausculation.  Normal effort. No rales, rhonchi, or wheezing.  Cardiovascular: Regular rate and rhythm without murmur.   Extremities: no clubbing, cyanosis, or edema.  Neuro: moves all extremities symmetrically    Please note that this dictation was created using voice recognition software. I have made every reasonable attempt to correct obvious errors, but I expect that there are errors of grammar and possibly content that I did not discover before finalizing the note.    Assessment/Plan:  1. Quadriceps muscle rupture, left, subsequent encounter    Letter was written as requested for patient.  He will continue with his physical therapy and follow-up with the orthopedic surgeon.    2. Essential hypertension    No changes made at this time, patient has follow-up in April.  Hopefully then he will be off work and " situational stressors will have resolved.    Followup: Pre-existing follow-up in April

## 2019-01-08 ENCOUNTER — NON-PROVIDER VISIT (OUTPATIENT)
Dept: OCCUPATIONAL MEDICINE | Facility: CLINIC | Age: 49
End: 2019-01-08

## 2019-01-08 DIAGNOSIS — Z11.1 ENCOUNTER FOR PPD TEST: Primary | ICD-10-CM

## 2019-01-08 PROCEDURE — 86580 TB INTRADERMAL TEST: CPT | Performed by: PREVENTIVE MEDICINE

## 2019-01-10 ENCOUNTER — NON-PROVIDER VISIT (OUTPATIENT)
Dept: OCCUPATIONAL MEDICINE | Facility: CLINIC | Age: 49
End: 2019-01-10

## 2019-01-10 DIAGNOSIS — Z11.1 ENCOUNTER FOR PPD SKIN TEST READING: ICD-10-CM

## 2019-01-10 LAB — TB WHEAL 3D P 5 TU DIAM: NORMAL MM

## 2019-01-15 ENCOUNTER — NON-PROVIDER VISIT (OUTPATIENT)
Dept: OCCUPATIONAL MEDICINE | Facility: CLINIC | Age: 49
End: 2019-01-15

## 2019-01-15 DIAGNOSIS — Z11.1 ENCOUNTER FOR PPD TEST: ICD-10-CM

## 2019-01-15 PROCEDURE — 86580 TB INTRADERMAL TEST: CPT | Performed by: PREVENTIVE MEDICINE

## 2019-01-17 ENCOUNTER — NON-PROVIDER VISIT (OUTPATIENT)
Dept: OCCUPATIONAL MEDICINE | Facility: CLINIC | Age: 49
End: 2019-01-17

## 2019-01-17 DIAGNOSIS — Z11.1 ENCOUNTER FOR PPD SKIN TEST READING: ICD-10-CM

## 2019-01-17 LAB — TB WHEAL 3D P 5 TU DIAM: NORMAL MM

## 2020-01-13 ENCOUNTER — OFFICE VISIT (OUTPATIENT)
Dept: MEDICAL GROUP | Facility: MEDICAL CENTER | Age: 50
End: 2020-01-13
Attending: NURSE PRACTITIONER
Payer: MEDICAID

## 2020-01-13 VITALS
DIASTOLIC BLOOD PRESSURE: 80 MMHG | WEIGHT: 243.2 LBS | HEIGHT: 74 IN | RESPIRATION RATE: 16 BRPM | TEMPERATURE: 97.3 F | HEART RATE: 82 BPM | OXYGEN SATURATION: 95 % | SYSTOLIC BLOOD PRESSURE: 120 MMHG | BODY MASS INDEX: 31.21 KG/M2

## 2020-01-13 DIAGNOSIS — Z13.228 SCREENING FOR METABOLIC DISORDER: ICD-10-CM

## 2020-01-13 DIAGNOSIS — Z11.3 ROUTINE SCREENING FOR STI (SEXUALLY TRANSMITTED INFECTION): ICD-10-CM

## 2020-01-13 DIAGNOSIS — Z13.21 ENCOUNTER FOR VITAMIN DEFICIENCY SCREENING: ICD-10-CM

## 2020-01-13 DIAGNOSIS — E78.00 PURE HYPERCHOLESTEROLEMIA: ICD-10-CM

## 2020-01-13 DIAGNOSIS — Z13.0 SCREENING FOR DEFICIENCY ANEMIA: ICD-10-CM

## 2020-01-13 DIAGNOSIS — E66.9 OBESITY (BMI 30-39.9): ICD-10-CM

## 2020-01-13 DIAGNOSIS — E11.9 CONTROLLED TYPE 2 DIABETES MELLITUS WITHOUT COMPLICATION, WITHOUT LONG-TERM CURRENT USE OF INSULIN (HCC): ICD-10-CM

## 2020-01-13 DIAGNOSIS — I10 ESSENTIAL HYPERTENSION: ICD-10-CM

## 2020-01-13 DIAGNOSIS — Z76.89 ENCOUNTER TO ESTABLISH CARE: ICD-10-CM

## 2020-01-13 PROCEDURE — 99213 OFFICE O/P EST LOW 20 MIN: CPT | Performed by: NURSE PRACTITIONER

## 2020-01-13 RX ORDER — OXYCODONE HYDROCHLORIDE AND ACETAMINOPHEN 5; 325 MG/1; MG/1
TABLET ORAL
COMMUNITY
End: 2020-01-13

## 2020-01-13 RX ORDER — GLIPIZIDE 10 MG/1
10 TABLET ORAL DAILY
COMMUNITY
End: 2020-05-19

## 2020-01-13 RX ORDER — CYCLOBENZAPRINE HCL 10 MG
TABLET ORAL
COMMUNITY
End: 2020-01-13

## 2020-01-13 RX ORDER — HYDROCODONE BITARTRATE AND ACETAMINOPHEN 5; 325 MG/1; MG/1
TABLET ORAL
COMMUNITY
End: 2020-01-13

## 2020-01-13 RX ORDER — AMLODIPINE AND BENAZEPRIL HYDROCHLORIDE 10; 40 MG/1; MG/1
1 CAPSULE ORAL DAILY
COMMUNITY
End: 2020-02-18

## 2020-01-13 RX ORDER — BLOOD-GLUCOSE METER
EACH MISCELLANEOUS
COMMUNITY
End: 2023-06-27

## 2020-01-13 RX ORDER — LOVASTATIN 20 MG/1
20 TABLET ORAL DAILY
COMMUNITY
End: 2020-02-18

## 2020-01-13 RX ORDER — LOSARTAN POTASSIUM AND HYDROCHLOROTHIAZIDE 25; 100 MG/1; MG/1
1 TABLET ORAL DAILY
COMMUNITY
End: 2020-03-17

## 2020-01-13 RX ORDER — ASPIRIN 325 MG
TABLET ORAL
COMMUNITY
End: 2020-01-13

## 2020-01-13 RX ORDER — LOSARTAN POTASSIUM 100 MG/1
TABLET ORAL
COMMUNITY
End: 2020-01-13

## 2020-01-13 RX ORDER — MELOXICAM 7.5 MG/1
TABLET ORAL
COMMUNITY
End: 2020-01-13

## 2020-01-13 ASSESSMENT — ENCOUNTER SYMPTOMS
FEVER: 0
CHILLS: 0
COUGH: 0
NAUSEA: 0
WHEEZING: 0
DEPRESSION: 0
SHORTNESS OF BREATH: 0
CONSTIPATION: 0
EYE DISCHARGE: 0
DIARRHEA: 0
BLURRED VISION: 0
SINUS PAIN: 0
DOUBLE VISION: 0
BLOOD IN STOOL: 0
TREMORS: 0
VOMITING: 0
PALPITATIONS: 0
ABDOMINAL PAIN: 0
TINGLING: 0
WEIGHT LOSS: 0
INSOMNIA: 0
DIZZINESS: 0

## 2020-01-13 ASSESSMENT — PATIENT HEALTH QUESTIONNAIRE - PHQ9: CLINICAL INTERPRETATION OF PHQ2 SCORE: 0

## 2020-01-13 NOTE — ASSESSMENT & PLAN NOTE
Reports he was always borderline DM, dx with A1c of 13 about 10 years ago.  Take meds as prescribed.  Checks his BS at home randomly at home, getting low 100s.  Denies lows.  Checks his feet regularly.  His last eye exam was approximately 6 months ago at [facility], he reports he was told his eyes were healthy.

## 2020-01-13 NOTE — PROGRESS NOTES
Chief Complaint   Patient presents with   • Establish Care       Subjective:     HPI:   Myron Lilly is a 49 y.o. male here to establish care, diabetes,  and to discuss the evaluation and management of:      Encounter to establish care  Prior PCP: Dr. Nayeli Adam    Other Providers:  none    Controlled type 2 diabetes mellitus without complication, without long-term current use of insulin ([facility])  Reports he was always borderline DM, dx with A1c of 13 about 10 years ago.  Take meds as prescribed.  Checks his BS at home randomly at home, getting low 100s.  Denies lows.  Checks his feet regularly.  His last eye exam was approximately 6 months ago at [facility], he reports he was told his eyes were healthy.    Essential hypertension  Dx: about 10 years ago.  Take meds as prescribed. Denies HA and vision changes.      Pure hypercholesterolemia  Dx about 10 years ago.  Takes lovastatin without issue.        ROS  Review of Systems   Constitutional: Negative for chills, fever, malaise/fatigue and weight loss.   HENT: Negative for congestion, ear pain and sinus pain.    Eyes: Negative for blurred vision, double vision and discharge.   Respiratory: Negative for cough, shortness of breath and wheezing.    Cardiovascular: Negative for chest pain, palpitations and leg swelling.   Gastrointestinal: Negative for abdominal pain, blood in stool, constipation, diarrhea, melena, nausea and vomiting.   Genitourinary: Negative for dysuria, frequency and urgency.   Neurological: Negative for dizziness, tingling and tremors.   Endo/Heme/Allergies: Negative.    Psychiatric/Behavioral: Negative for depression. The patient does not have insomnia.          No Known Allergies    Current medicines (including changes today)  Current Outpatient Medications   Medication Sig Dispense Refill   • glucose blood strip OneTouch Verio strips     • Blood Glucose Monitoring Suppl (ONETOUCH VERIO FLEX SYSTEM) w/Device Kit  OneTouch Verio System   USE PRN     • amlodipine-benazepril (LOTREL) 10-40 MG per capsule amlodipine 10 mg-benazepril 40 mg capsule     • SITagliptin (JANUVIA) 100 MG Tab Januvia 100 mg tablet     • Empagliflozin (JARDIANCE) 25 MG Tab Jardiance 25 mg tablet     • glipiZIDE (GLUCOTROL) 10 MG Tab glipizide 10 mg tablet     • losartan-hydrochlorothiazide (HYZAAR) 100-25 MG per tablet losartan 100 mg-hydrochlorothiazide 25 mg tablet     • lovastatin (MEVACOR) 20 MG Tab lovastatin 20 mg tablet     • meloxicam (MOBIC) 7.5 MG Tab Take 7.5 mg by mouth every day.     • SAXagliptin HCl 5 MG Tab Take 5 mg by mouth every day.     • metformin (GLUCOPHAGE) 1000 MG tablet metformin 1,000 mg tablet       No current facility-administered medications for this visit.      He  has a past medical history of Arthritis, Diabetes (HCC) (10/2018), High cholesterol, Hypertension, and Pain (10/2018).  He  has a past surgical history that includes cholecystectomy; elbow orif (Left, 1980); shoulder arthroscopy (Right, 11/5/2018); shoulder decompression (Right, 11/5/2018); clavicle distal excision (Right, 11/5/2018); and shoulder arthroscopy w/ bicipital tenodesis repair (Right, 11/5/2018).  Social History     Tobacco Use   • Smoking status: Never Smoker   • Smokeless tobacco: Former User   Substance Use Topics   • Alcohol use: Yes     Comment: 2 per month   • Drug use: No       Family History   Problem Relation Age of Onset   • Diabetes Mother    • Diabetes Father    • Hypertension Father    • Diabetes Brother    • Cancer Child         ALL at 16 months     Family Status   Relation Name Status   • Mo  Alive   • Fa  Alive   • Bro  (Not Specified)       Patient Active Problem List    Diagnosis Date Noted   • Chest pain 11/12/2018     Priority: High   • Encounter to establish care 01/13/2020   • Atrophy of quadriceps femoris muscle 10/30/2018   • Quadriceps muscle rupture, left, subsequent encounter 10/09/2018   • Chronic right shoulder pain  "07/03/2018   • Controlled type 2 diabetes mellitus without complication, without long-term current use of insulin (HCC) 07/03/2018   • Essential hypertension 07/03/2018   • Pure hypercholesterolemia 07/03/2018   • Obesity (BMI 30-39.9) 07/03/2018          Objective:     /80 (BP Location: Left arm, Patient Position: Sitting, BP Cuff Size: Adult long)   Pulse 82   Temp 36.3 °C (97.3 °F) (Temporal)   Resp 16   Ht 1.88 m (6' 2\")   Wt 110.3 kg (243 lb 3.2 oz)   SpO2 95%  Body mass index is 31.23 kg/m².    Physical Exam:  Physical Exam   Constitutional: He is oriented to person, place, and time and well-developed, well-nourished, and in no distress. No distress.   HENT:   Head: Normocephalic.   Right Ear: Tympanic membrane and external ear normal.   Left Ear: Tympanic membrane and external ear normal.   Eyes: Pupils are equal, round, and reactive to light. Conjunctivae and EOM are normal.   Neck: Normal range of motion. Neck supple. No tracheal deviation present.   Cardiovascular: Normal rate, regular rhythm, normal heart sounds and intact distal pulses.   Pulmonary/Chest: Effort normal and breath sounds normal.   Abdominal: Soft. Bowel sounds are normal.   Musculoskeletal: Normal range of motion.   Lymphadenopathy:        Head (right side): No preauricular adenopathy present.        Head (left side): No preauricular adenopathy present.     He has no cervical adenopathy.   Neurological: He is alert and oriented to person, place, and time. He has normal sensation, normal strength and intact cranial nerves. Gait normal.   Skin: Skin is warm and dry.   Psychiatric: Affect and judgment normal.          Assessment and Plan:     The following treatment plan was discussed:    1. Controlled type 2 diabetes mellitus without complication, without long-term current use of insulin (HCC)  HEMOGLOBIN A1C    MICROALBUMIN CREAT RATIO URINE    REFERRAL TO PHARMACOTHERAPY SERVICE  -Chronic problem, unclear stability.  Patient " reports tight medication compliance.  He is interested in meeting with our pharmacist to see if we can streamline his medication regimen.  Referral process discussed with patient.  Continue to check feet regularly.   2. Essential hypertension  MICROALBUMIN CREAT RATIO URINE    REFERRAL TO PHARMACOTHERAPY SERVICE  -Chronic problem, stable.  We will have him establish with our pharmacist to ensure he is on optimal regimen.   3. Pure hypercholesterolemia  Lipid Profile    REFERRAL TO PHARMACOTHERAPY SERVICE  -Chronic problem, stable.  We will have him establish with our pharmacist to ensure he is on optimal regimen.   4. Encounter to establish care     5. Obesity (BMI 30-39.9)  Patient identified as having weight management issue.  Appropriate orders and counseling given.   6. Screening for metabolic disorder  Comp Metabolic Panel    TSH WITH REFLEX TO FT4   7. Screening for deficiency anemia  CBC WITHOUT DIFFERENTIAL   8. Encounter for vitamin deficiency screening  VITAMIN D,25 HYDROXY   9. Routine screening for STI (sexually transmitted infection)  HIV AG/AB COMBO ASSAY SCREENING    RPR (SYPHILIS)    Chlamydia/GC PCR Urine Or Swab       Any change or worsening of signs or symptoms, patient encouraged to follow-up or report to emergency room for further evaluation. Patient verbalizes understanding and agrees.    Follow-Up: No follow-ups on file.  I will contact the patient with lab results.  Referral process discussed for pharmacotherapy services.  We will determine follow-up at that time.      PLEASE NOTE: This dictation was created using voice recognition software. I have made every reasonable attempt to correct obvious errors, but I expect that there are errors of grammar and possibly content that I did not discover before finalizing the note.

## 2020-02-11 ENCOUNTER — NON-PROVIDER VISIT (OUTPATIENT)
Dept: MEDICAL GROUP | Facility: MEDICAL CENTER | Age: 50
End: 2020-02-11
Attending: NURSE PRACTITIONER
Payer: MEDICAID

## 2020-02-11 VITALS
HEART RATE: 97 BPM | BODY MASS INDEX: 30.63 KG/M2 | WEIGHT: 238.6 LBS | SYSTOLIC BLOOD PRESSURE: 133 MMHG | DIASTOLIC BLOOD PRESSURE: 97 MMHG

## 2020-02-11 DIAGNOSIS — E11.9 CONTROLLED TYPE 2 DIABETES MELLITUS WITHOUT COMPLICATION, WITHOUT LONG-TERM CURRENT USE OF INSULIN (HCC): ICD-10-CM

## 2020-02-11 LAB
HBA1C MFR BLD: 7.6 % (ref 0–5.6)
INT CON NEG: ABNORMAL
INT CON POS: ABNORMAL

## 2020-02-11 PROCEDURE — 99213 OFFICE O/P EST LOW 20 MIN: CPT | Performed by: PHARMACIST

## 2020-02-11 PROCEDURE — 83036 HEMOGLOBIN GLYCOSYLATED A1C: CPT

## 2020-02-11 PROCEDURE — 83036 HEMOGLOBIN GLYCOSYLATED A1C: CPT | Performed by: NURSE PRACTITIONER

## 2020-02-11 NOTE — NON-PROVIDER
Patient Consult Note    TIME IN: 0900  TIME OUT: 1000    Primary care physician: RAFFAELE Ferreira    Reason for consult: Management of Uncontrolled Type 2 Diabetes    HPI:  Myron Lilly is a 49 y.o. old patient who comes in today for evaluation of above stated problem.    Pt is new to our service. Explained pathophysiology, marcrovascular and microvascular complications. Extensive dietary education provided. Unfortunately, pt did not get labs done and was unable to recall exactly which medications he is taking. There are duplicative medications on his medication list, such as saxagliptin and sitagliptin. D/t these barriers, medication changes could not be implemented today.    Pt has experienced hypoglycemia. Pt understands how to properly manage hypoglycemia.    Pt is relatively healthy and has not had a cardiac event. He exercises daily, but is aware his diet could be improved.    Most Recent HbA1c:   Lab Results   Component Value Date/Time    HBA1C 7.6 (A) 02/11/2020 09:00 AM      Lab Results   Component Value Date/Time    CREATININE 0.89 11/13/2018 03:08 AM              Diabetes Medication History and Current Regimen - PT IS UNSURE OF EXACT DOSES  Metformin: 1000 mg daily   SGLT-2 Inhibitor:  Empagliflozin 25 mg once daily   Sulfonylurea: Glipizide 10 mg daily  DPP-4 Inhibitor: Saxagliptin and/or sitagliptin    Pt has home glucometer and proper testing technique - yes    Pt reports blood sugars:   Before Breakfast: 100-130s    Hypoglycemia:  Pt recalls one episode when he did not eat and his BG was in the 20s    Pt's treatment of Hypoglycemia - 15:15 Rule    Foot Exam:  Monofilament exam - OVERDUE    Preventative Management  BP regimen (ACE/ARB) - benazepril and/or losartan  Statin - lovastatin  Last Retinal Scan - OVERDUE?  Last Foot Exam - OVERDUE  Last A1c - 11/2018  Last Microalbuminuria - 10/2018    Past Medical History:  Patient Active Problem List    Diagnosis Date Noted   • Chest pain  11/12/2018     Priority: High   • Encounter to establish care 01/13/2020   • Atrophy of quadriceps femoris muscle 10/30/2018   • Quadriceps muscle rupture, left, subsequent encounter 10/09/2018   • Chronic right shoulder pain 07/03/2018   • Controlled type 2 diabetes mellitus without complication, without long-term current use of insulin (HCC) 07/03/2018   • Essential hypertension 07/03/2018   • Pure hypercholesterolemia 07/03/2018   • Obesity (BMI 30-39.9) 07/03/2018       Past Surgical History:  Past Surgical History:   Procedure Laterality Date   • SHOULDER ARTHROSCOPY Right 11/5/2018    Procedure: SHOULDER ARTHROSCOPY;  Surgeon: Wesly Weinstein M.D.;  Location: [facility];  Service: Orthopedics   • SHOULDER DECOMPRESSION Right 11/5/2018    Procedure: SHOULDER DECOMPRESSION-  W/MINI OPEN SUBACROMIAL;  Surgeon: Wesly Weinstein M.D.;  Location: [facility];  Service: Orthopedics   • CLAVICLE DISTAL EXCISION Right 11/5/2018    Procedure: CLAVICLE DISTAL EXCISION, LATERAL CORACOID DECOMPRESSION;  Surgeon: Wesly Weinstein M.D.;  Location: [facility];  Service: Orthopedics   • SHOULDER ARTHROSCOPY W/ BICIPITAL TENODESIS REPAIR Right 11/5/2018    Procedure: SHOULDER ARTHROSCOPY W/ BICIPITAL TENODESIS REPAIR;  Surgeon: Wesly Weinstein M.D.;  Location: [facility];  Service: Orthopedics   • ELBOW ORIF Left 1980   • CHOLECYSTECTOMY      approx 2004       Allergies:  Patient has no known allergies.    Social History:  Social History     Socioeconomic History   • Marital status:      Spouse name: Not on file   • Number of children: Not on file   • Years of education: Not on file   • Highest education level: Not on file   Occupational History   • Not on file   Social Needs   • Financial resource strain: Not on file   • Food insecurity:     Worry: Not on file     Inability: Not on file   • Transportation needs:     Medical: Not on file     Non-medical: Not  on file   Tobacco Use   • Smoking status: Never Smoker   • Smokeless tobacco: Former User   Substance and Sexual Activity   • Alcohol use: Yes     Comment: 2 per month   • Drug use: No   • Sexual activity: Yes     Partners: Female   Lifestyle   • Physical activity:     Days per week: Not on file     Minutes per session: Not on file   • Stress: Not on file   Relationships   • Social connections:     Talks on phone: Not on file     Gets together: Not on file     Attends [employer] service: Not on file     Active member of club or organization: Not on file     Attends meetings of clubs or organizations: Not on file     Relationship status: Not on file   • Intimate partner violence:     Fear of current or ex partner: Not on file     Emotionally abused: Not on file     Physically abused: Not on file     Forced sexual activity: Not on file   Other Topics Concern   •  Service Yes   • Blood Transfusions Not Asked   • Caffeine Concern Not Asked   • Occupational Exposure Not Asked   • Hobby Hazards Not Asked   • Sleep Concern Not Asked   • Stress Concern Not Asked   • Weight Concern Yes   • Special Diet Not Asked   • Back Care Not Asked   • Exercise No   • Bike Helmet Not Asked   • Seat Belt Not Asked   • Self-Exams Not Asked   Social History Narrative   • Not on file       Family History:  Family History   Problem Relation Age of Onset   • Diabetes Mother    • Diabetes Father    • Hypertension Father    • Diabetes Brother    • Cancer Child         ALL at 16 months       Medications:    Current Outpatient Medications:   •  glucose blood strip, OneTouch Verio strips, Disp: , Rfl:   •  Blood Glucose Monitoring Suppl (ONETOUCH VERIO FLEX SYSTEM) w/Device Kit, OneTouch Verio System  USE PRN, Disp: , Rfl:   •  amlodipine-benazepril (LOTREL) 10-40 MG per capsule, Take 1 Cap by mouth every day., Disp: , Rfl:   •  SITagliptin (JANUVIA) 100 MG Tab, Januvia 100 mg tablet, Disp: , Rfl:   •  Empagliflozin (JARDIANCE) 25 MG Tab,  Take 25 mg by mouth every day., Disp: , Rfl:   •  glipiZIDE (GLUCOTROL) 10 MG Tab, Take 10 mg by mouth every day., Disp: , Rfl:   •  losartan-hydrochlorothiazide (HYZAAR) 100-25 MG per tablet, Take 1 Tab by mouth every day., Disp: , Rfl:   •  lovastatin (MEVACOR) 20 MG Tab, Take 20 mg by mouth every day., Disp: , Rfl:   •  meloxicam (MOBIC) 7.5 MG Tab, Take 7.5 mg by mouth every day., Disp: , Rfl:   •  SAXagliptin HCl 5 MG Tab, Take 5 mg by mouth every day., Disp: , Rfl:   •  metformin (GLUCOPHAGE) 1000 MG tablet, Take 1,000 mg by mouth every day., Disp: , Rfl:     Labs: Reviewed    Physical Examination:  Vital signs: /97 (BP Location: Left arm, Patient Position: Sitting, BP Cuff Size: Adult)   Pulse 97   Wt 108.2 kg (238 lb 9.6 oz)   BMI 30.63 kg/m²  Body mass index is 30.63 kg/m².    Assessment and Plan:    1. DM2    - Medication changes - none made today since pt is unable to recall which medications he's on and since he did not get labs done. Pt to bring in medications to next visit as well as get labs done. Consider d/c glipzide and DPP4i and add GLP1RA for additional cardiovascular benefit.     - Lifestyle changes - continue exercising; improve dietary habits and restricting carbohydrate consumption    Will need to address care gaps at next visit: retinal screening & monofilament exam    Get labs done (CMP, UACR, lipid panel)  Start monitoring FBG daily  Bring medications to next visit    Return in about 1 week (around 2/18/2020).    Yulia Borjas, PharmD  02/11/20    CC:   RAFFAELE Ferreira

## 2020-02-11 NOTE — NON-PROVIDER
Pharmacotherapy Hypertension Visit  02/11/20     Type of Visit:  Initial Visit  Start Time:0900  End time:1000    Myron Lilly has been referred for evaluation and management of hypertension    HPI:   Vitals:    02/11/20 0900 02/11/20 1204   BP: 130/85 133/97   BP Location: Left arm Left arm   Patient Position: Sitting Sitting   BP Cuff Size: Adult Adult   Pulse:  97   Weight: 108.2 kg (238 lb 9.6 oz)      Both arms - in future use arm with higher reading    Home BP readings:   Pt has not been checking BP at home  Any readings above  180/110:  None   Doretha symptoms of high blood pressure (TIA, Stroke, Head ache, vision changes): intermittent headaches d/t hx of TBI    Current Prescription HTN Medications - including dose:    Per Epic: amlodipine-benazepril 10-40 mg daily  Losartan-HCTZ 100-25 mg daily    Pt is on duplicative ACEi and ARB, however pt unable to confirm if he's taking both or just one. Pt to bring in all his medications to next visit.    Current side effects potentially related to antihypertensive medications (lightheaded, dizziness, leg swelling): None    Current Adherence to Blood Pressure Medications - Partial - states he may forget to take once in a while    Previously attempted BP medications:   Unknown    Any current interfering substances: None     Any current lifestyle issues affecting BP:  None    Lab Results   Component Value Date/Time    SODIUM 137 11/13/2018 03:08 AM    POTASSIUM 3.3 (L) 11/13/2018 03:08 AM    CHLORIDE 107 11/13/2018 03:08 AM    CO2 24 11/13/2018 03:08 AM    GLUCOSE 158 (H) 11/13/2018 03:08 AM    BUN 17 11/13/2018 03:08 AM    CREATININE 0.89 11/13/2018 03:08 AM         Medications Reconciled  CURRENT MEDICATIONS:   Current Outpatient Medications:   •  glucose blood, OneTouch Verio strips  •  OneTouch Verio Flex System, OneTouch Verio System  USE PRN  •  amlodipine-benazepril, 1 Cap, Oral, DAILY  •  SITagliptin, Januvia 100 mg tablet  •  Empagliflozin, 25 mg, Oral,  DAILY  •  glipiZIDE, 10 mg, Oral, DAILY  •  losartan-hydrochlorothiazide, 1 Tab, Oral, DAILY  •  lovastatin, 20 mg, Oral, DAILY  •  meloxicam, 7.5 mg, Oral, DAILY, 11/12/2018 at 0800  •  sAXagliptin HCl, 5 mg, Oral, DAILY, 11/12/2018 at 0800  •  metformin, 1,000 mg, Oral, DAILY, 11/12/2018 at 0800  ALLERGIES: Patient has no known allergies.    SOCIAL HISTORY   Social History     Tobacco Use   Smoking Status Never Smoker   Smokeless Tobacco Former User     Change in weight: Stable  Exercise habits: moderate regular exercise program  Diet: common adult    ASSESSMENT AND PLAN    BP is ELEVATED  BP Goal 130/80     BP Monitoring Recommendations - Home BP     Proper technique for blood pressure monitoring reviewed with patient.  Pt instructed to check BP at varying times through out the day 4 times per week.  Provided pt log for blood pressure monitoring and pt instructed to bring in at each visit for reviews    Lifestyle Recommendations From Today’s Visit:    Eating Plan: Concentrate on  Low simple carb    Medication recommendations from today's visit:   No changes to medications since he could not confirm exactly which BP meds he is taking and until he gets labs done prior to next visit    Follow-Up: 1 week  Yulia Borjas, PharmD    CC:  JEANINE Ferreira.R.Roxana Pinzon A.P.*

## 2020-02-11 NOTE — NON-PROVIDER
[facility] - Initial Visit  Date of Service: 02/11/20    Myron Fontana has been referred for evaluation and management of dyslipidemia    Referral Source: RAFFAELE Ferreira    HPI  History of ASCVD: No  Other Established (non-atherosclerotic) Vascular Disease, if Present: None  Age at Initial Diagnosis of Dyslipidemia: 2010    Current Prescription Lipid Lowering Medications - including dose:   Statin: Lovastatin 20 mg daily? Pt unsure of dose  Current Lipid Lowering and Related Supplements:   None  Any Current Side Effects Potentially Related to Lipid Lowering therapy?   No  Current Adherence to Lipid Lowering Therapies   Complete  Previously Attempted Interventions for Lipids - including outcome  Statin: None   Non-Statin: None  Any Previous History of Statin Intolerance?   No  Baseline Lipids Prior to Treatment:   Results for MYRON FONTANA (MRN [id number]) as of 2/11/2020 11:59   9/27/2012 09:12 10/6/2017 11:45 10/6/2018 08:46 11/13/2018 03:08   Cholesterol,Tot 168 141 110 106   Triglycerides 92 242 (H) 111 242 (H)   HDL 37 (A) 34 (A) 33 (A) 28 (A)    59 55 30     History of other CV risk factors: hx of T2DM and HTN    PAST MEDICAL HISTORY:  has a past medical history of Arthritis, Diabetes (HCC) (10/2018), High cholesterol, Hypertension, and Pain (10/2018).    PAST SURGICAL HISTORY:  has a past surgical history that includes cholecystectomy; elbow orif (Left, 1980); shoulder arthroscopy (Right, 11/5/2018); shoulder decompression (Right, 11/5/2018); clavicle distal excision (Right, 11/5/2018); and shoulder arthroscopy w/ bicipital tenodesis repair (Right, 11/5/2018).    CURRENT MEDICATIONS:   Current Outpatient Medications:   •  glucose blood, OneTouch Verio strips  •  OneTouch Verio Flex System, OneTouch Verio System  USE PRN  •  amlodipine-benazepril, amlodipine 10 mg-benazepril 40 mg capsule  •  SITagliptin, Januvia 100 mg tablet  •  Empagliflozin, Jardiance 25 mg tablet  •   glipiZIDE, glipizide 10 mg tablet  •  losartan-hydrochlorothiazide, losartan 100 mg-hydrochlorothiazide 25 mg tablet  •  lovastatin, lovastatin 20 mg tablet  •  meloxicam, 7.5 mg, Oral, DAILY, 11/12/2018 at 0800  •  sAXagliptin HCl, 5 mg, Oral, DAILY, 11/12/2018 at 0800  •  metformin, metformin 1,000 mg tablet, 11/12/2018 at 0800    ALLERGIES: Patient has no known allergies.    SOCIAL HISTORY   Social History     Tobacco Use   Smoking Status Never Smoker   Smokeless Tobacco Former User     Change in weight: Stable  Exercise habits: moderate regular exercise program  Diet: common adult    ROS  Physical Exam    DATA REVIEW:  Most Recent Lipid Panel:   Lab Results   Component Value Date    CHOLSTRLTOT 106 11/13/2018    TRIGLYCERIDE 242 (H) 11/13/2018    HDL 28 (A) 11/13/2018    LDL 30 11/13/2018       Other Pertinent Blood Work:   Lab Results   Component Value Date    SODIUM 137 11/13/2018    POTASSIUM 3.3 (L) 11/13/2018    CHLORIDE 107 11/13/2018    CO2 24 11/13/2018    ANION 6.0 11/13/2018    GLUCOSE 158 (H) 11/13/2018    BUN 17 11/13/2018    CREATININE 0.89 11/13/2018    CALCIUM 8.6 11/13/2018    ASTSGOT 20 11/13/2018    ALTSGPT 36 11/13/2018    ALKPHOSPHAT 33 11/13/2018    TBILIRUBIN 1.5 11/13/2018    ALBUMIN 3.9 11/13/2018    AGRATIO 1.6 11/13/2018    TSHULTRASEN 0.820 11/12/2018       ASSESSMENT AND PLAN  Patient Type, check all that apply:   Primary Prevention  Established Atherosclerotic Cardiovascular Disease (ASCVD)  No  Other Established (non-atherosclerotic) Vascular Disease, if Present:  None  Evidence of Heterozygous Familial Hypercholesterolemia (FH):   No  ACC/AHA Indication for Statin Therapy, manisha all that apply:  Diabetes aged 40-75: Indication for Moderate intensity statin  Calculated Risk for ASCVD, if applicable    N/A  Other Significant Risk Markers, if any, manisha all that apply   None  Goal LDL-C and nonHDL-C based on Clinic Protocol  LDL-C:   <100 mg/dL   n-HDL < 130 mg/dL  Lifestyle  Recommendations From Today’s Visit:    Eating Plan: Concentrate on  Low simple carb  Exercise: Continue current exercise regimen  Statin Therapy Recommendations from Today’s Visit:   Continue lovastatin until pt gets lipid panel done prior to next appt  Non-Statin Medications Recommendations from Today’s Visit:   None  Indication for PCSK9 Inhibitor, if applicable:  Not currently indicated  Supplements Recommended at this visit: None   Recommendations for Other Cardiovascular Risk Factors, manisha all that apply: Hypertension- BP goal < 130/80 and Diabetes/Impaired Fasting Glucose- a1C < 7.0     Follow-Up: 1 weeks    Yulia Borjas, PharmD    CC:  JOSE MIGUEL FerreiraR.N.  Pradeep, JEANINE Trinidad.*

## 2020-02-13 ENCOUNTER — HOSPITAL ENCOUNTER (OUTPATIENT)
Dept: LAB | Facility: MEDICAL CENTER | Age: 50
End: 2020-02-13
Attending: NURSE PRACTITIONER
Payer: MEDICAID

## 2020-02-13 DIAGNOSIS — Z13.228 SCREENING FOR METABOLIC DISORDER: ICD-10-CM

## 2020-02-13 DIAGNOSIS — I10 ESSENTIAL HYPERTENSION: ICD-10-CM

## 2020-02-13 DIAGNOSIS — Z13.0 SCREENING FOR DEFICIENCY ANEMIA: ICD-10-CM

## 2020-02-13 DIAGNOSIS — E78.00 PURE HYPERCHOLESTEROLEMIA: ICD-10-CM

## 2020-02-13 DIAGNOSIS — Z13.21 ENCOUNTER FOR VITAMIN DEFICIENCY SCREENING: ICD-10-CM

## 2020-02-13 DIAGNOSIS — E11.9 CONTROLLED TYPE 2 DIABETES MELLITUS WITHOUT COMPLICATION, WITHOUT LONG-TERM CURRENT USE OF INSULIN (HCC): ICD-10-CM

## 2020-02-13 DIAGNOSIS — Z11.3 ROUTINE SCREENING FOR STI (SEXUALLY TRANSMITTED INFECTION): ICD-10-CM

## 2020-02-13 LAB
25(OH)D3 SERPL-MCNC: 15 NG/ML (ref 30–100)
ALBUMIN SERPL BCP-MCNC: 5 G/DL (ref 3.2–4.9)
ALBUMIN/GLOB SERPL: 1.7 G/DL
ALP SERPL-CCNC: 43 U/L (ref 30–99)
ALT SERPL-CCNC: 54 U/L (ref 2–50)
ANION GAP SERPL CALC-SCNC: 10 MMOL/L (ref 0–11.9)
AST SERPL-CCNC: 26 U/L (ref 12–45)
BILIRUB SERPL-MCNC: 1.1 MG/DL (ref 0.1–1.5)
BUN SERPL-MCNC: 25 MG/DL (ref 8–22)
C TRACH DNA SPEC QL NAA+PROBE: NEGATIVE
CALCIUM SERPL-MCNC: 10 MG/DL (ref 8.5–10.5)
CHLORIDE SERPL-SCNC: 100 MMOL/L (ref 96–112)
CHOLEST SERPL-MCNC: 208 MG/DL (ref 100–199)
CO2 SERPL-SCNC: 30 MMOL/L (ref 20–33)
CREAT SERPL-MCNC: 1.12 MG/DL (ref 0.5–1.4)
CREAT UR-MCNC: 109.8 MG/DL
ERYTHROCYTE [DISTWIDTH] IN BLOOD BY AUTOMATED COUNT: 43.4 FL (ref 35.9–50)
EST. AVERAGE GLUCOSE BLD GHB EST-MCNC: 186 MG/DL
FASTING STATUS PATIENT QL REPORTED: NORMAL
GLOBULIN SER CALC-MCNC: 3 G/DL (ref 1.9–3.5)
GLUCOSE SERPL-MCNC: 159 MG/DL (ref 65–99)
HBA1C MFR BLD: 8.1 % (ref 0–5.6)
HCT VFR BLD AUTO: 52.6 % (ref 42–52)
HDLC SERPL-MCNC: 31 MG/DL
HGB BLD-MCNC: 18 G/DL (ref 14–18)
HIV 1+2 AB+HIV1 P24 AG SERPL QL IA: NON REACTIVE
LDLC SERPL CALC-MCNC: 118 MG/DL
MCH RBC QN AUTO: 32.1 PG (ref 27–33)
MCHC RBC AUTO-ENTMCNC: 34.2 G/DL (ref 33.7–35.3)
MCV RBC AUTO: 93.8 FL (ref 81.4–97.8)
MICROALBUMIN UR-MCNC: 2.4 MG/DL
MICROALBUMIN/CREAT UR: 22 MG/G (ref 0–30)
N GONORRHOEA DNA SPEC QL NAA+PROBE: NEGATIVE
PLATELET # BLD AUTO: 254 K/UL (ref 164–446)
PMV BLD AUTO: 10.9 FL (ref 9–12.9)
POTASSIUM SERPL-SCNC: 3.8 MMOL/L (ref 3.6–5.5)
PROT SERPL-MCNC: 8 G/DL (ref 6–8.2)
RBC # BLD AUTO: 5.61 M/UL (ref 4.7–6.1)
SODIUM SERPL-SCNC: 140 MMOL/L (ref 135–145)
SPECIMEN SOURCE: NORMAL
TREPONEMA PALLIDUM IGG+IGM AB [PRESENCE] IN SERUM OR PLASMA BY IMMUNOASSAY: NON REACTIVE
TRIGL SERPL-MCNC: 297 MG/DL (ref 0–149)
TSH SERPL DL<=0.005 MIU/L-ACNC: 1.76 UIU/ML (ref 0.38–5.33)
WBC # BLD AUTO: 4.8 K/UL (ref 4.8–10.8)

## 2020-02-13 PROCEDURE — 80053 COMPREHEN METABOLIC PANEL: CPT

## 2020-02-13 PROCEDURE — 80061 LIPID PANEL: CPT

## 2020-02-13 PROCEDURE — 85027 COMPLETE CBC AUTOMATED: CPT

## 2020-02-13 PROCEDURE — 86780 TREPONEMA PALLIDUM: CPT

## 2020-02-13 PROCEDURE — 82570 ASSAY OF URINE CREATININE: CPT

## 2020-02-13 PROCEDURE — 84443 ASSAY THYROID STIM HORMONE: CPT

## 2020-02-13 PROCEDURE — 87389 HIV-1 AG W/HIV-1&-2 AB AG IA: CPT

## 2020-02-13 PROCEDURE — 87591 N.GONORRHOEAE DNA AMP PROB: CPT

## 2020-02-13 PROCEDURE — 87491 CHLMYD TRACH DNA AMP PROBE: CPT

## 2020-02-13 PROCEDURE — 83036 HEMOGLOBIN GLYCOSYLATED A1C: CPT

## 2020-02-13 PROCEDURE — 82306 VITAMIN D 25 HYDROXY: CPT

## 2020-02-13 PROCEDURE — 82043 UR ALBUMIN QUANTITATIVE: CPT

## 2020-02-13 PROCEDURE — 36415 COLL VENOUS BLD VENIPUNCTURE: CPT

## 2020-02-14 DIAGNOSIS — E11.9 CONTROLLED TYPE 2 DIABETES MELLITUS WITHOUT COMPLICATION, WITHOUT LONG-TERM CURRENT USE OF INSULIN (HCC): Primary | ICD-10-CM

## 2020-02-18 ENCOUNTER — NON-PROVIDER VISIT (OUTPATIENT)
Dept: MEDICAL GROUP | Facility: MEDICAL CENTER | Age: 50
End: 2020-02-18
Attending: NURSE PRACTITIONER
Payer: MEDICAID

## 2020-02-18 VITALS
SYSTOLIC BLOOD PRESSURE: 146 MMHG | WEIGHT: 236 LBS | HEART RATE: 121 BPM | DIASTOLIC BLOOD PRESSURE: 98 MMHG | BODY MASS INDEX: 30.3 KG/M2

## 2020-02-18 DIAGNOSIS — I10 ESSENTIAL HYPERTENSION: ICD-10-CM

## 2020-02-18 DIAGNOSIS — E55.9 VITAMIN D DEFICIENCY: ICD-10-CM

## 2020-02-18 DIAGNOSIS — E11.8 DIABETIC COMPLICATION (HCC): ICD-10-CM

## 2020-02-18 DIAGNOSIS — E78.5 DYSLIPIDEMIA: ICD-10-CM

## 2020-02-18 PROCEDURE — 99213 OFFICE O/P EST LOW 20 MIN: CPT | Performed by: PHARMACIST

## 2020-02-18 RX ORDER — EMPAGLIFLOZIN AND METFORMIN HYDROCHLORIDE 12.5; 1 MG/1; MG/1
1 TABLET ORAL
Qty: 60 TAB | Refills: 5 | Status: SHIPPED | OUTPATIENT
Start: 2020-04-13 | End: 2020-10-05 | Stop reason: SDUPTHER

## 2020-02-18 RX ORDER — SEMAGLUTIDE 1.34 MG/ML
0.25 INJECTION, SOLUTION SUBCUTANEOUS
Qty: 1 PEN | Refills: 0 | Status: SHIPPED
Start: 2020-02-18 | End: 2020-03-17

## 2020-02-18 RX ORDER — ERGOCALCIFEROL 1.25 MG/1
50000 CAPSULE ORAL
Qty: 12 CAP | Refills: 0 | Status: SHIPPED | OUTPATIENT
Start: 2020-02-18 | End: 2020-05-12

## 2020-02-18 RX ORDER — ATORVASTATIN CALCIUM 20 MG/1
20 TABLET, FILM COATED ORAL
Qty: 30 TAB | Refills: 5 | Status: SHIPPED | OUTPATIENT
Start: 2020-02-18 | End: 2020-08-11 | Stop reason: SDUPTHER

## 2020-02-18 RX ORDER — AMLODIPINE BESYLATE 5 MG/1
5 TABLET ORAL DAILY
Qty: 30 TAB | Refills: 5 | Status: SHIPPED
Start: 2020-02-18 | End: 2020-04-28

## 2020-02-18 NOTE — PROGRESS NOTES
New Patient Consult Note  Primary care physician: RAFFAELE Ferreira    Reason for consult: Management of Uncontrolled Type 2 Diabetes    HPI:  Myron Lilly is a 49 y.o. old patient who comes in today for evaluation of above stated problem.  Full dietary education and DM pathophysiology was covered with this patient last week. He started logging his BG readings and his FBG is averaging 150-200 with his current therapy.    He was educated on dietary tips and specific considerations to apply for his HTN, DM and dyslipidemia. More dietary education will be provided in future visits.    Most recent labs shows good kidney functions, elevated cholesterol panel and low Vit D.    The 8 core defect with T2DM were explained and the MOA of each medication he is going to be on was also explained in term of these core defect. The importance of CVD primary prevention and risk was also explained.    Most Recent HbA1c:   Lab Results   Component Value Date/Time    HBA1C 8.1 (H) 02/13/2020 07:11 AM        Current Diabetes Regimen:  SGLT-2 Inhibitor:  Empagliflozin 25 mg once daily   Metformin 1000 mg daily  saxagliptin 5 mg daily  Glipizide 10 mg daily    Before Breakfast: 150-200  Before Lunch:  Before Dinner:  Before Bedtime:  Other times:  Hypoglycemia:  None      ROS:  Constitutional: No weight loss  Cardiac: No palpitations or racing heart  Resp: No shortness of breath  Neuro: No numbness or tinging in feet  Endo: No heat or cold intolerance, no polyuria or polydipsia  All other systems were reviewed and were negative.    Past Medical History:  Patient Active Problem List    Diagnosis Date Noted   • Chest pain 11/12/2018     Priority: High   • Encounter to establish care 01/13/2020   • Atrophy of quadriceps femoris muscle 10/30/2018   • Quadriceps muscle rupture, left, subsequent encounter 10/09/2018   • Chronic right shoulder pain 07/03/2018   • Controlled type 2 diabetes mellitus without complication, without  long-term current use of insulin (HCC) 07/03/2018   • Essential hypertension 07/03/2018   • Pure hypercholesterolemia 07/03/2018   • Obesity (BMI 30-39.9) 07/03/2018       Past Surgical History:  Past Surgical History:   Procedure Laterality Date   • SHOULDER ARTHROSCOPY Right 11/5/2018    Procedure: SHOULDER ARTHROSCOPY;  Surgeon: Wesly Weinstein M.D.;  Location: [facility];  Service: Orthopedics   • SHOULDER DECOMPRESSION Right 11/5/2018    Procedure: SHOULDER DECOMPRESSION-  W/MINI OPEN SUBACROMIAL;  Surgeon: Wesly Weinstein M.D.;  Location: [facility];  Service: Orthopedics   • CLAVICLE DISTAL EXCISION Right 11/5/2018    Procedure: CLAVICLE DISTAL EXCISION, LATERAL CORACOID DECOMPRESSION;  Surgeon: Wesly Weinstein M.D.;  Location: [facility];  Service: Orthopedics   • SHOULDER ARTHROSCOPY W/ BICIPITAL TENODESIS REPAIR Right 11/5/2018    Procedure: SHOULDER ARTHROSCOPY W/ BICIPITAL TENODESIS REPAIR;  Surgeon: Wesly Weinstein M.D.;  Location: [facility];  Service: Orthopedics   • ELBOW ORIF Left 1980   • CHOLECYSTECTOMY      approx 2004       Allergies:  Patient has no known allergies.    Social History:  Social History     Socioeconomic History   • Marital status:      Spouse name: Not on file   • Number of children: Not on file   • Years of education: Not on file   • Highest education level: Not on file   Occupational History   • Not on file   Social Needs   • Financial resource strain: Not on file   • Food insecurity     Worry: Not on file     Inability: Not on file   • Transportation needs     Medical: Not on file     Non-medical: Not on file   Tobacco Use   • Smoking status: Never Smoker   • Smokeless tobacco: Former User   Substance and Sexual Activity   • Alcohol use: Yes     Comment: 2 per month   • Drug use: No   • Sexual activity: Yes     Partners: Female   Lifestyle   • Physical activity     Days per week: Not on file     Minutes  per session: Not on file   • Stress: Not on file   Relationships   • Social connections     Talks on phone: Not on file     Gets together: Not on file     Attends L[employer] service: Not on file     Active member of club or organization: Not on file     Attends meetings of clubs or organizations: Not on file     Relationship status: Not on file   • Intimate partner violence     Fear of current or ex partner: Not on file     Emotionally abused: Not on file     Physically abused: Not on file     Forced sexual activity: Not on file   Other Topics Concern   •  Service Yes   • Blood Transfusions Not Asked   • Caffeine Concern Not Asked   • Occupational Exposure Not Asked   • Hobby Hazards Not Asked   • Sleep Concern Not Asked   • Stress Concern Not Asked   • Weight Concern Yes   • Special Diet Not Asked   • Back Care Not Asked   • Exercise No   • Bike Helmet Not Asked   • Seat Belt Not Asked   • Self-Exams Not Asked   Social History Narrative   • Not on file       Family History:  Family History   Problem Relation Age of Onset   • Diabetes Mother    • Diabetes Father    • Hypertension Father    • Diabetes Brother    • Cancer Child         ALL at 16 months       Medications:    Current Outpatient Medications:   •  Empagliflozin-metFORMIN HCl (SYNJARDY) 12.5-1000 MG Tab, Take 1 Tab by mouth 2 Times a Day., Disp: 60 Tab, Rfl: 5  •  Semaglutide,0.25 or 0.5MG/DOS, (OZEMPIC, 0.25 OR 0.5 MG/DOSE,) 2 MG/1.5ML Solution Pen-injector, Inject 0.25 mg as instructed every 7 days. For 2 weeks then increase to 0.5 mg weekly for 3 weeks, Disp: 1 PEN, Rfl: 0  •  atorvastatin (LIPITOR) 20 MG Tab, Take 1 Tab by mouth every bedtime., Disp: 30 Tab, Rfl: 5  •  amLODIPine (NORVASC) 5 MG Tab, Take 1 Tab by mouth every day., Disp: 30 Tab, Rfl: 5  •  ergocalciferol (DRISDOL) 31108 UNIT capsule, Take 1 Cap by mouth every 7 days., Disp: 12 Cap, Rfl: 0  •  Blood Glucose Monitoring Suppl w/Device Kit, Please dispense 100 test strips and lancets  with 5 refills E11.8, Disp: 1 Kit, Rfl: 0  •  glucose blood strip, OneTouch Verio strips, Disp: , Rfl:   •  Blood Glucose Monitoring Suppl (ONETOUCH VERIO FLEX SYSTEM) w/Device Kit, OneTouch Verio System  USE PRN, Disp: , Rfl:   •  glipiZIDE (GLUCOTROL) 10 MG Tab, Take 10 mg by mouth every day., Disp: , Rfl:   •  losartan-hydrochlorothiazide (HYZAAR) 100-25 MG per tablet, Take 1 Tab by mouth every day., Disp: , Rfl:   •  meloxicam (MOBIC) 7.5 MG Tab, Take 7.5 mg by mouth every day., Disp: , Rfl:     Labs: Reviewed    Physical Examination:  Vital signs: /98   Pulse (!) 121   Wt 107 kg (236 lb)   BMI 30.30 kg/m²  Body mass index is 30.3 kg/m².  General: No apparent distress, cooperative  Eyes: No scleral icterus or discharge  ENMT: Normal on external inspection of nose, lips, normal thyroid exam  Neck: No abnormal masses on inspection  Resp: Normal effort, clear to auscultation bilaterally   CVS: Regular rate and rhythm, S1 S2 normal, no murmur   Extremities: No edema  Abdomen: abdominal obesity present  Neuro: Alert and oriented  Skin: No rash  Psych: Normal mood and affect, intact memory and able to make informed decisions    Assessment and Plan:    DC glipizide and Saxagliptin.    Increase Metformin to 1000 mg BID and continue Jardiance. I'm working on a PA for Synjardy.    Patient will be starting Ozempic 0.25mg weekly for 2 weeks then increase to 0.5mg weekly for 3 weeks if tolerated. Plan is to optimize therapy to 1 mg weekly if tolerated. SE and expectations were explained to patient in details.    ergocalciferol (DRISDOL) 07589 UNIT capsule, once weekly x 3 months. Then repeat labs in 3 months to reevaluate.      Return in about 2 weeks (around 3/3/2020).    Thank you for allowing me to participate in the care of this patient.    Judi Henderson, PharmD  02/18/20    CC:   RAFFAELE Ferreira

## 2020-02-18 NOTE — PROGRESS NOTES
Pharmacotherapy Hypertension Visit  02/18/20     Type of Visit:  Follow Up  Time 08:45-09:45 am    Myron Lilly is here today for a follow up on hypertension management.    This patient came last week for BP management but there was no recent labs and patient wasn't sure of his medications list. He was instructed to get labs done and bring medications to next visit.  Today we have labs and updated list of medications.    Pt has a what seems to be a common cold, he is tired and has been having a fever for the past 2 days but he is starting to feel better. His  Pulse is elevated today which might be due to his current sickness.     HPI:   Vitals:    02/18/20 0855 02/18/20 0937   BP: 150/100 146/98   BP Location: Right arm    Patient Position: Sitting    Pulse:  (!) 121   Weight: 107 kg (236 lb)          Home BP readings:   None  Any readings above  180/110:  None   Doretha symptoms of high blood pressure (TIA, Stroke, Head ache, vision changes):       Current Prescription HTN Medications - including dose:    Losartan/ HCTZ 100/25 MG DAILY    Time since last HTN medication adjustment YEARS      Current side effects potentially related to antihypertensive medications (lightheaded, dizziness, leg swelling): None    Current Adherence to Blood Pressure Medications (complete, partial, completely non-adherent)    Any current interfering substances: None (caffeine IN MODERATION, NSAIDs, corticosteroids, etc)    Any current lifestyle issues affecting BP:  None    In the past 6 months have pt had the following (if no, then order) YES  EKG  Microalbumin  Electrolytes and eGFR  TSH  CBC  Fasting lipid panel  Fasting glucose    Since last visit any of the below   Creatinine increase > 0.5 NO  Potassium > 5 or < 3.5 has HX of low potassium  New edema present NO  Hyponatremia NO    Lab Results   Component Value Date/Time    SODIUM 140 02/13/2020 07:11 AM    POTASSIUM 3.8 02/13/2020 07:11 AM    CHLORIDE 100 02/13/2020 07:11 AM     CO2 30 02/13/2020 07:11 AM    GLUCOSE 159 (H) 02/13/2020 07:11 AM    BUN 25 (H) 02/13/2020 07:11 AM    CREATININE 1.12 02/13/2020 07:11 AM         Medications Reconciled  CURRENT MEDICATIONS:   Current Outpatient Medications:   •  Synjardy, 1 Tab, Oral, BID  •  Ozempic (0.25 or 0.5 MG/DOSE), 0.25 mg, Subcutaneous, Q7 DAYS  •  atorvastatin, 20 mg, Oral, QHS  •  amLODIPine, 5 mg, Oral, DAILY  •  ergocalciferol, 50,000 Units, Oral, Q7 DAYS  •  Blood Glucose Monitoring Suppl, Please dispense 100 test strips and lancets with 5 refills E11.8, Taking  •  glucose blood, OneTouch Verio strips, Taking  •  OneTouch Verio Flex System, OneTouch Verio System  USE PRN, Taking  •  glipiZIDE, 10 mg, Oral, DAILY, Taking  •  losartan-hydrochlorothiazide, 1 Tab, Oral, DAILY, Taking  •  meloxicam, 7.5 mg, Oral, DAILY, Not Taking  ALLERGIES: Patient has no known allergies.    SOCIAL HISTORY   Social History     Tobacco Use   Smoking Status Never Smoker   Smokeless Tobacco Former User     Change in weight: Stable  Exercise habits: moderate regular exercise program  Diet: common adult      ASSESSMENT AND PLAN    BP is  (elevated, at goal, etc)    BP Goal 130/80    Does pt have known end organ damage? NO (ventricular hypertension [LVH], hypertensive retinopathy, ischemic cardiovascular disease)        BP Monitoring Recommendations - Home BP     Proper technique for blood pressure monitoring reviewed with patient.  Pt instructed to check BP at varying times through out the day 4 times per week.  Provided pt log for blood pressure monitoring and pt instructed to bring in at each visit for reviews. Pt will check BID and bring log to next visit.    Lifestyle Recommendations From Today’s Visit:    Eating Plan: Concentrate on  Low sat/Trans fat, Low simple carb and DASH/Med Style diet      Medication recommendations from today's visit:   Amlodipine 5 mg daily, SE was explained. Dose will be adjusted as needed to target for a max of 10 mg  daily.    Pt to bring BP log to next f/u in 2 weeks and call with any question prior to visit date if needed.      Importance of adherence discussed    Follow-Up: 2 weeks (every 1-4 weeks until at goal)     Judi Henderson, PharmD    CC:  Roxana Fernández A.P.R.TESHA.  Roxana Fernández A.P.*

## 2020-02-18 NOTE — PROGRESS NOTES
[facility] - FollowUp Visit 08:45 -09:45 am  Date of Service: 02/18/20    Myron Lilly is here for follow up of dyslipidemia, HTN and T2DM  He used to take Lovastatin years ago but wasn't sure why it was DCd    HPI  Pertinent Interval History since last visit:   Pt came back this visit with updated labs and current medication list.  Current Prescription Lipid Lowering Medications - including dose:   Statin: None  Non-Statin: None  Current Lipid Lowering and Related Supplements:   None  Any Current Side Effects Potentially Related to Lipid Lowering therapy?   No  Any Previous History of Statin Intolerance?   NONE  Baseline Lipids Prior to Treatment:     Lab Results   Component Value Date/Time    CHOLSTRLTOT 208 (H) 02/13/2020 07:11 AM     (H) 02/13/2020 07:11 AM    HDL 31 (A) 02/13/2020 07:11 AM    TRIGLYCERIDE 297 (H) 02/13/2020 07:11 AM       Lab Results   Component Value Date/Time    SODIUM 140 02/13/2020 07:11 AM    POTASSIUM 3.8 02/13/2020 07:11 AM    CHLORIDE 100 02/13/2020 07:11 AM    CO2 30 02/13/2020 07:11 AM    GLUCOSE 159 (H) 02/13/2020 07:11 AM    BUN 25 (H) 02/13/2020 07:11 AM    CREATININE 1.12 02/13/2020 07:11 AM     Lab Results   Component Value Date/Time    ALKPHOSPHAT 43 02/13/2020 07:11 AM    ASTSGOT 26 02/13/2020 07:11 AM    ALTSGPT 54 (H) 02/13/2020 07:11 AM    TBILIRUBIN 1.1 02/13/2020 07:11 AM                SOCIAL HISTORY  Social History     Tobacco Use   Smoking Status Never Smoker   Smokeless Tobacco Former User      Change in weight: Stable  Exercise habits: moderate regular exercise program   Diet: common adult    ROS  Physical Exam    DATA REVIEW  Most Recent Lipid Panel:   Lab Results   Component Value Date    CHOLSTRLTOT 208 (H) 02/13/2020    TRIGLYCERIDE 297 (H) 02/13/2020    HDL 31 (A) 02/13/2020     (H) 02/13/2020       Other Pertinent Blood Work:   Lab Results   Component Value Date    SODIUM 140 02/13/2020    POTASSIUM 3.8 02/13/2020    CHLORIDE 100  02/13/2020    CO2 30 02/13/2020    ANION 10.0 02/13/2020    GLUCOSE 159 (H) 02/13/2020    BUN 25 (H) 02/13/2020    CREATININE 1.12 02/13/2020    CALCIUM 10.0 02/13/2020    ASTSGOT 26 02/13/2020    ALTSGPT 54 (H) 02/13/2020    ALKPHOSPHAT 43 02/13/2020    TBILIRUBIN 1.1 02/13/2020    ALBUMIN 5.0 (H) 02/13/2020    AGRATIO 1.7 02/13/2020    TSHULTRASEN 1.760 02/13/2020       Recent Imaging Studies:    None since last visit    ASSESSMENT AND PLAN  Patient Type, check all that apply:   Primary Prevention  Established Atherosclerotic Cardiovascular Disease (ASCVD)  No  Other Established (non-atherosclerotic) Vascular Disease, if Present:    None  Evidence of Heterozygous Familial Hypercholesterolemia (FH): Unknown (If yes, how was diagnosis made)  ACC/AHA Indication for Statin Therapy, manisha all that apply:   Diabetes aged 40-75: Indication for Moderate intensity statin   Calculated Risk for ASCVD, if applicable:  N/A  Other Significant Risk Markers, if any, manisha all that apply:  None  National Lipid Association (NLA) Goal (if applicable):  LDL-C:   <100 mg/dL  Lifestyle Recommendations From Today’s Visit:   Eating Plan: Concentrate on  Low sat/Trans fat, Low simple carb and DASH/Med Style diet    Statin Recommendations from Today's Visit  Atorvastatin 20 mg QHS  Non-Statin Medications Recommendations from Today’s Visit:   Increase fish in your diet, good fat with full dietary education provided today.  Indication for PCSK9 Inhibitor, if applicable:  Not currently indicated  Supplements Recommended at this visit:  Fish oil, if TG doesn't reach goal with Atorvastatin and DM management only I might consider Vasciepa  Recommendations for Other Cardiovascular Risk Factors, manisha all that apply:   Hypertension- being managed  and Diabetes/Impaired Fasting Glucose- being managed    Pt to repeat CMP and Lipid panel in 3 months    Follow-Up:   3 months to f/u on dyslipidemia    Judi Henderson, VickiD    CC:  Roxana Fernández,  Roxana Erickson A.P.*

## 2020-02-19 NOTE — RESULT ENCOUNTER NOTE
Chuck Sanches,      I got your lab results.  I know Judi gave you some results regarding your blood sugar, cholesterol, and vitamin D.  You are negative for HIV, syphilis, gonorrhea and chlamydia.  Your thyroid function is normal.  Please let me know if you have any questions.  Roxana

## 2020-03-17 ENCOUNTER — NON-PROVIDER VISIT (OUTPATIENT)
Dept: MEDICAL GROUP | Facility: MEDICAL CENTER | Age: 50
End: 2020-03-17
Attending: NURSE PRACTITIONER
Payer: MEDICAID

## 2020-03-17 VITALS
DIASTOLIC BLOOD PRESSURE: 90 MMHG | WEIGHT: 227.5 LBS | BODY MASS INDEX: 29.21 KG/M2 | HEART RATE: 77 BPM | SYSTOLIC BLOOD PRESSURE: 130 MMHG

## 2020-03-17 DIAGNOSIS — I10 ESSENTIAL HYPERTENSION: ICD-10-CM

## 2020-03-17 PROCEDURE — 99213 OFFICE O/P EST LOW 20 MIN: CPT | Performed by: PHARMACIST

## 2020-03-17 RX ORDER — LOSARTAN POTASSIUM AND HYDROCHLOROTHIAZIDE 25; 100 MG/1; MG/1
1 TABLET ORAL DAILY
Qty: 30 TAB | Refills: 2 | Status: SHIPPED | OUTPATIENT
Start: 2020-03-17 | End: 2020-06-08

## 2020-03-17 RX ORDER — SEMAGLUTIDE 1.34 MG/ML
1 INJECTION, SOLUTION SUBCUTANEOUS
Qty: 4 PEN | Refills: 2 | Status: SHIPPED | OUTPATIENT
Start: 2020-03-17 | End: 2020-08-18

## 2020-03-17 ASSESSMENT — FIBROSIS 4 INDEX: FIB4 SCORE: 0.65

## 2020-03-17 NOTE — NON-PROVIDER
Pharmacotherapy Hypertension Visit  03/17/20     Type of Visit:  Follow Up  Start Time:1530  End time:1600    Myron Lilly has been referred for evaluation and management of hypertension    HPI:   Vitals:    03/17/20 1533   BP: 130/90   Pulse: 77   Weight: 103.2 kg (227 lb 8 oz)     Both arms - in future use arm with higher reading    Home BP readings:   160/88, ?132/60s   - BP machine at home may need to be calibrated today.   - In clinic, utilizing BP monitor from home: 155/90 pulse 75    Any readings above  180/110:  None   Any symptoms of high blood pressure (TIA, Stroke, Head ache, vision changes): None    Current Prescription HTN Medications - including dose:    HCTZ 25 mg daily (previously thought pt was taking losartan-hctz 100/25 mg daily)  Amlodipine 5 mg daily    Current side effects potentially related to antihypertensive medications (lightheaded, dizziness, leg swelling): None    Current Adherence to Blood Pressure Medications -   Partial - there was confusion on when to start BP meds per discussion over the phone yesterday. Pt was reporting /100s but hasn't been taking losartan-hctz 100-25 mg. Pt was instructed to restart losartan-hctz 100-25 mg daily. Upon this visit, pt brought all of his medications. He has not been taking losartan-hctz 100-25 and has been only taking hctz 25 mg daily.      Lab Results   Component Value Date/Time    SODIUM 140 02/13/2020 07:11 AM    POTASSIUM 3.8 02/13/2020 07:11 AM    CHLORIDE 100 02/13/2020 07:11 AM    CO2 30 02/13/2020 07:11 AM    GLUCOSE 159 (H) 02/13/2020 07:11 AM    BUN 25 (H) 02/13/2020 07:11 AM    CREATININE 1.12 02/13/2020 07:11 AM        CURRENT MEDICATIONS:   Current Outpatient Medications:   •  losartan-hydrochlorothiazide, 1 Tab, Oral, DAILY  •  Ozempic (1 MG/DOSE), 1 mg, Subcutaneous, Q7 DAYS  •  Synjardy, 1 Tab, Oral, BID  •  atorvastatin, 20 mg, Oral, QHS  •  amLODIPine, 5 mg, Oral, DAILY  •  ergocalciferol, 50,000 Units, Oral, Q7 DAYS  •   Blood Glucose Monitoring Suppl, Please dispense 100 test strips and lancets with 5 refills E11.8, Taking  •  glucose blood, OneTouch Verio strips, Taking  •  OneTouch Verio Flex System, OneTouch Verio System  USE PRN, Taking  •  glipiZIDE, 10 mg, Oral, DAILY, Taking  •  meloxicam, 7.5 mg, Oral, DAILY, Not Taking  ALLERGIES: Patient has no known allergies.    SOCIAL HISTORY   Social History     Tobacco Use   Smoking Status Never Smoker   Smokeless Tobacco Former User     Change in weight: Lost 9 lbs since last visit  Exercise habits: moderate regular exercise program  Diet: diabetic diet - common adult     ASSESSMENT AND PLAN    BP is slightly elevated    BP Goal 130/80    Does pt have known end organ damage? No     BP Monitoring Recommendations - Home BP     Medication recommendations from today's visit:   Restart losartan-hctz 100-25 mg daily  Continue amlodipine 5 mg daily    Call our clinic if experiencing low BP + s/s of hypotension (dizziness, fatigue, orthostatic hypotension)    Blood Work Ordered At Today’s visit: CMP, lipid, vit D    Follow-Up: in 1 week via phone, in 6 weeks in clinic    Yulia Borjas, PharmD    CC:  RAFFAELE Ferreira

## 2020-03-17 NOTE — NON-PROVIDER
Patient Consult Note    TIME IN: 1530  TIME OUT: 1600    Primary care physician: RAFFAELE Ferreira    Reason for consult: Management of Uncontrolled Type 2 Diabetes    HPI:  Myron Lilly is a 49 y.o. old patient who comes in today for evaluation of above stated problem.    Pt has been tolerating his diabetic regimen. He has increased his Ozempic to 0.5 mg weekly and denies any n/v or other side effects. Pt lives a healthy lifestyle and exercises regularly. His FBG is close to goal.    Most Recent HbA1c:   Lab Results   Component Value Date/Time    HBA1C 8.1 (H) 02/13/2020 07:11 AM      Lab Results   Component Value Date/Time    CREATININE 1.12 02/13/2020 07:11 AM              Diabetes Medication History and Current Regimen  Metformin: 1000 mg bid - pt has not picked up Synjardy yet  GLP-1 Agent: Ozempic 0.5 mg q weekly  SGLT-2 Inhibitor:  Empagliflozin 25 mg once daily     Pt reports blood sugars:   Before Breakfast: 130-140s    Hypoglycemia:  None    Past Medical History:  Patient Active Problem List    Diagnosis Date Noted   • Chest pain 11/12/2018     Priority: High   • Encounter to establish care 01/13/2020   • Atrophy of quadriceps femoris muscle 10/30/2018   • Quadriceps muscle rupture, left, subsequent encounter 10/09/2018   • Chronic right shoulder pain 07/03/2018   • Controlled type 2 diabetes mellitus without complication, without long-term current use of insulin ([facility]) 07/03/2018   • Essential hypertension 07/03/2018   • Pure hypercholesterolemia 07/03/2018   • Obesity (BMI 30-39.9) 07/03/2018       Past Surgical History:  Past Surgical History:   Procedure Laterality Date   • SHOULDER ARTHROSCOPY Right 11/5/2018    Procedure: SHOULDER ARTHROSCOPY;  Surgeon: Wesly Weinstein M.D.;  Location: SURGERY [facility];  Service: Orthopedics   • SHOULDER DECOMPRESSION Right 11/5/2018    Procedure: SHOULDER DECOMPRESSION-  W/MINI OPEN SUBACROMIAL;  Surgeon: Wesly Weinstein M.D.;  Location:  SURGERY [facility];  Service: Orthopedics   • CLAVICLE DISTAL EXCISION Right 11/5/2018    Procedure: CLAVICLE DISTAL EXCISION, LATERAL CORACOID DECOMPRESSION;  Surgeon: Wesly Weinstein M.D.;  Location: SURGERY [facility];  Service: Orthopedics   • SHOULDER ARTHROSCOPY W/ BICIPITAL TENODESIS REPAIR Right 11/5/2018    Procedure: SHOULDER ARTHROSCOPY W/ BICIPITAL TENODESIS REPAIR;  Surgeon: Wesly Weinstein M.D.;  Location: SURGERY [facility];  Service: Orthopedics   • ELBOW ORIF Left 1980   • CHOLECYSTECTOMY      approx 2004       Allergies:  Patient has no known allergies.    Social History:  Social History     Socioeconomic History   • Marital status:      Spouse name: Not on file   • Number of children: Not on file   • Years of education: Not on file   • Highest education level: Not on file   Occupational History   • Not on file   Social Needs   • Financial resource strain: Not on file   • Food insecurity     Worry: Not on file     Inability: Not on file   • Transportation needs     Medical: Not on file     Non-medical: Not on file   Tobacco Use   • Smoking status: Never Smoker   • Smokeless tobacco: Former User   Substance and Sexual Activity   • Alcohol use: Yes     Comment: 2 per month   • Drug use: No   • Sexual activity: Yes     Partners: Female   Lifestyle   • Physical activity     Days per week: Not on file     Minutes per session: Not on file   • Stress: Not on file   Relationships   • Social connections     Talks on phone: Not on file     Gets together: Not on file     Attends [employer] service: Not on file     Active member of club or organization: Not on file     Attends meetings of clubs or organizations: Not on file     Relationship status: Not on file   • Intimate partner violence     Fear of current or ex partner: Not on file     Emotionally abused: Not on file     Physically abused: Not on file     Forced sexual activity: Not on file   Other Topics Concern   •   Service Yes   • Blood Transfusions Not Asked   • Caffeine Concern Not Asked   • Occupational Exposure Not Asked   • Hobby Hazards Not Asked   • Sleep Concern Not Asked   • Stress Concern Not Asked   • Weight Concern Yes   • Special Diet Not Asked   • Back Care Not Asked   • Exercise No   • Bike Helmet Not Asked   • Seat Belt Not Asked   • Self-Exams Not Asked   Social History Narrative   • Not on file       Family History:  Family History   Problem Relation Age of Onset   • Diabetes Mother    • Diabetes Father    • Hypertension Father    • Diabetes Brother    • Cancer Child         ALL at 16 months       Medications:    Current Outpatient Medications:   •  losartan-hydrochlorothiazide (HYZAAR) 100-25 MG per tablet, Take 1 Tab by mouth every day., Disp: 30 Tab, Rfl: 2  •  Semaglutide, 1 MG/DOSE, (OZEMPIC, 1 MG/DOSE,) 2 MG/1.5ML Solution Pen-injector, Inject 1 mg as instructed every 7 days., Disp: 4 PEN, Rfl: 2  •  Empagliflozin-metFORMIN HCl (SYNJARDY) 12.5-1000 MG Tab, Take 1 Tab by mouth 2 Times a Day., Disp: 60 Tab, Rfl: 5  •  atorvastatin (LIPITOR) 20 MG Tab, Take 1 Tab by mouth every bedtime., Disp: 30 Tab, Rfl: 5  •  amLODIPine (NORVASC) 5 MG Tab, Take 1 Tab by mouth every day., Disp: 30 Tab, Rfl: 5  •  ergocalciferol (DRISDOL) 68645 UNIT capsule, Take 1 Cap by mouth every 7 days., Disp: 12 Cap, Rfl: 0  •  Blood Glucose Monitoring Suppl w/Device Kit, Please dispense 100 test strips and lancets with 5 refills E11.8, Disp: 1 Kit, Rfl: 0  •  glucose blood strip, OneTouch Verio strips, Disp: , Rfl:   •  Blood Glucose Monitoring Suppl (ONETOUCH VERIO FLEX SYSTEM) w/Device Kit, OneTouch Verio System  USE PRN, Disp: , Rfl:   •  glipiZIDE (GLUCOTROL) 10 MG Tab, Take 10 mg by mouth every day., Disp: , Rfl:   •  meloxicam (MOBIC) 7.5 MG Tab, Take 7.5 mg by mouth every day., Disp: , Rfl:     Labs: Reviewed    Physical Examination:  Vital signs: /90   Pulse 77   Wt 103.2 kg (227 lb 8 oz)   BMI 29.21 kg/m²  Body  mass index is 29.21 kg/m².    Assessment and Plan:    1. DM2  Stop metformin and Jardiance - start Synjardy 12.5mg/1000mg BID  Increase Ozempic to 1 mg weekly after finishing the remaining 2 doses of 0.5 mg weekly    Continue monitoring FBG    Get labs done (CMP, lipids, vit D)    Return in about 6 weeks (around 4/28/2020).    Yulia Borjas, PharmD  03/17/20    CC:   RAFFAELE Ferreira

## 2020-04-28 ENCOUNTER — NON-PROVIDER VISIT (OUTPATIENT)
Dept: MEDICAL GROUP | Facility: MEDICAL CENTER | Age: 50
End: 2020-04-28
Attending: INTERNAL MEDICINE
Payer: MEDICAID

## 2020-04-28 VITALS
HEART RATE: 92 BPM | DIASTOLIC BLOOD PRESSURE: 84 MMHG | WEIGHT: 231.1 LBS | BODY MASS INDEX: 29.67 KG/M2 | SYSTOLIC BLOOD PRESSURE: 122 MMHG

## 2020-04-28 DIAGNOSIS — I10 ESSENTIAL HYPERTENSION: Primary | ICD-10-CM

## 2020-04-28 PROCEDURE — 99213 OFFICE O/P EST LOW 20 MIN: CPT | Performed by: PHARMACIST

## 2020-04-28 RX ORDER — AMLODIPINE BESYLATE 2.5 MG/1
7.5 TABLET ORAL DAILY
Qty: 90 TAB | Refills: 0 | Status: SHIPPED
Start: 2020-04-28 | End: 2020-05-19

## 2020-04-28 ASSESSMENT — FIBROSIS 4 INDEX: FIB4 SCORE: 0.65

## 2020-04-28 NOTE — PROGRESS NOTES
Patient Consult Note    TIME IN: 1015  TIME OUT: 1100    Primary care physician: RAFFAELE Ferreira    Reason for consult: Management of Uncontrolled Type 2 Diabetes    HPI:  Myron Lilly is a 49 y.o. old patient who comes in today for evaluation of above stated problem.    Pt has been tolerating optimal dose of Ozempic with occasional bouts of n/v when he overeats. Counseled pt that this is a standard ADR and to refrain from overeating.    His water intake is optimal (drinks 4 large Hydroflasks per day), however he can improve on his diet regimen. Pt tends to eat sugary cereals and pasta. Extensive dietary education given. Pt understands if FBG remains above goal, we will consider adding insulin. D/t COVID-19, it is difficulty for him to continue with his normal exercise regimen.    Most Recent HbA1c:   Lab Results   Component Value Date/Time    HBA1C 8.1 (H) 02/13/2020 07:11 AM      Lab Results   Component Value Date/Time    CREATININE 1.12 02/13/2020 07:11 AM              Diabetes Medication History and Current Regimen  Metformin/SGLT2i: Synjardy 12.5/1000 mg BID  GLP-1 Agent: Ozempic 1 mg weekly    Pt has home glucometer and proper testing technique - yes    Pt reports blood sugars:   Before Breakfast: 150s    Hypoglycemia:  None    Past Medical History:  Patient Active Problem List    Diagnosis Date Noted   • Chest pain 11/12/2018     Priority: High   • Encounter to establish care 01/13/2020   • Atrophy of quadriceps femoris muscle 10/30/2018   • Quadriceps muscle rupture, left, subsequent encounter 10/09/2018   • Chronic right shoulder pain 07/03/2018   • Controlled type 2 diabetes mellitus without complication, without long-term current use of insulin ([facility]) 07/03/2018   • Essential hypertension 07/03/2018   • Pure hypercholesterolemia 07/03/2018   • Obesity (BMI 30-39.9) 07/03/2018       Past Surgical History:  Past Surgical History:   Procedure Laterality Date   • SHOULDER ARTHROSCOPY Right  11/5/2018    Procedure: SHOULDER ARTHROSCOPY;  Surgeon: Wesly Weinstein M.D.;  Location: SURGERY [facility];  Service: Orthopedics   • SHOULDER DECOMPRESSION Right 11/5/2018    Procedure: SHOULDER DECOMPRESSION-  W/MINI OPEN SUBACROMIAL;  Surgeon: Wesly Weinstein M.D.;  Location: SURGERY [facility];  Service: Orthopedics   • CLAVICLE DISTAL EXCISION Right 11/5/2018    Procedure: CLAVICLE DISTAL EXCISION, LATERAL CORACOID DECOMPRESSION;  Surgeon: Wesly Weinstein M.D.;  Location: SURGERY [facility];  Service: Orthopedics   • SHOULDER ARTHROSCOPY W/ BICIPITAL TENODESIS REPAIR Right 11/5/2018    Procedure: SHOULDER ARTHROSCOPY W/ BICIPITAL TENODESIS REPAIR;  Surgeon: Wesly Weinstein M.D.;  Location: SURGERY [facility];  Service: Orthopedics   • ELBOW ORIF Left 1980   • CHOLECYSTECTOMY      approx 2004       Allergies:  Patient has no known allergies.    Social History:  Social History     Socioeconomic History   • Marital status:      Spouse name: Not on file   • Number of children: Not on file   • Years of education: Not on file   • Highest education level: Not on file   Occupational History   • Not on file   Social Needs   • Financial resource strain: Not on file   • Food insecurity     Worry: Not on file     Inability: Not on file   • Transportation needs     Medical: Not on file     Non-medical: Not on file   Tobacco Use   • Smoking status: Never Smoker   • Smokeless tobacco: Former User   Substance and Sexual Activity   • Alcohol use: Yes     Comment: 2 per month   • Drug use: No   • Sexual activity: Yes     Partners: Female   Lifestyle   • Physical activity     Days per week: Not on file     Minutes per session: Not on file   • Stress: Not on file   Relationships   • Social connections     Talks on phone: Not on file     Gets together: Not on file     Attends [employer] service: Not on file     Active member of club or organization: Not on file     Attends meetings of clubs  or organizations: Not on file     Relationship status: Not on file   • Intimate partner violence     Fear of current or ex partner: Not on file     Emotionally abused: Not on file     Physically abused: Not on file     Forced sexual activity: Not on file   Other Topics Concern   •  Service Yes   • Blood Transfusions Not Asked   • Caffeine Concern Not Asked   • Occupational Exposure Not Asked   • Hobby Hazards Not Asked   • Sleep Concern Not Asked   • Stress Concern Not Asked   • Weight Concern Yes   • Special Diet Not Asked   • Back Care Not Asked   • Exercise No   • Bike Helmet Not Asked   • Seat Belt Not Asked   • Self-Exams Not Asked   Social History Narrative   • Not on file       Family History:  Family History   Problem Relation Age of Onset   • Diabetes Mother    • Diabetes Father    • Hypertension Father    • Diabetes Brother    • Cancer Child         ALL at 16 months       Medications:    Current Outpatient Medications:   •  amLODIPine (NORVASC) 2.5 MG Tab, Take 3 Tabs by mouth every day., Disp: 90 Tab, Rfl: 0  •  losartan-hydrochlorothiazide (HYZAAR) 100-25 MG per tablet, Take 1 Tab by mouth every day., Disp: 30 Tab, Rfl: 2  •  Semaglutide, 1 MG/DOSE, (OZEMPIC, 1 MG/DOSE,) 2 MG/1.5ML Solution Pen-injector, Inject 1 mg as instructed every 7 days., Disp: 4 PEN, Rfl: 2  •  Empagliflozin-metFORMIN HCl (SYNJARDY) 12.5-1000 MG Tab, Take 1 Tab by mouth 2 Times a Day., Disp: 60 Tab, Rfl: 5  •  atorvastatin (LIPITOR) 20 MG Tab, Take 1 Tab by mouth every bedtime., Disp: 30 Tab, Rfl: 5  •  ergocalciferol (DRISDOL) 07079 UNIT capsule, Take 1 Cap by mouth every 7 days., Disp: 12 Cap, Rfl: 0  •  Blood Glucose Monitoring Suppl w/Device Kit, Please dispense 100 test strips and lancets with 5 refills E11.8, Disp: 1 Kit, Rfl: 0  •  glucose blood strip, OneTouch Verio strips, Disp: , Rfl:   •  Blood Glucose Monitoring Suppl (ONETOUCH VERIO FLEX SYSTEM) w/Device Kit, OneTouch Verio System  USE PRN, Disp: , Rfl:   •   glipiZIDE (GLUCOTROL) 10 MG Tab, Take 10 mg by mouth every day., Disp: , Rfl:   •  meloxicam (MOBIC) 7.5 MG Tab, Take 7.5 mg by mouth every day., Disp: , Rfl:     Labs: Reviewed    Physical Examination:  Vital signs: /84 (BP Location: Left arm, Patient Position: Sitting, BP Cuff Size: Adult)   Pulse 92   Wt 104.8 kg (231 lb 1.6 oz)   BMI 29.67 kg/m²  Body mass index is 29.67 kg/m².    Assessment and Plan:    1. DM2  Medication changes: none  - Continue regimen    Lifestyle changes  - Refrain from eating cereals and pastas in order to bring FBG to goal.  - Refrain from overeating.     Get labs done  A1C will be done @ next visit    Return in about 4 weeks (around 5/26/2020).  Judi Henderson, PharmD  Yulia Borjas, VickiD  04/28/20    CC:   RAFFAELE Ferreira

## 2020-04-28 NOTE — PROGRESS NOTES
Pharmacotherapy Hypertension Visit  04/28/20     Type of Visit:  Follow Up  Start Time:1015  End time: 1100    Myron Lilly has been referred for evaluation and management of hypertension    HPI:   Pt has issues with discrepancies in BP readings between his home monitor and in clinic (see below_.    BP Monitor in Clinic:   122/84 p 92 (L arm)  Home Monitor:   147/91 p 78 L arm   126/91 p 78 R arm   Manual BP in Clinic:  130/80 R arm manually  135/80 L arm manually    Home BP readings:  -150, DBP 100s  Any readings above  180/110:  None   Doretha symptoms of high blood pressure (TIA, Stroke, Head ache, vision changes): None    Current Prescription HTN Medications - including dose:    Amlodipine 7.5 mg daily (recent increase within the past 2 wks)  Losartan/HCTZ 100-25 mg daily    Current side effects potentially related to antihypertensive medications: Lightheadedness    Current Adherence to Blood Pressure Medications Complete    Lab Results   Component Value Date/Time    SODIUM 140 02/13/2020 07:11 AM    POTASSIUM 3.8 02/13/2020 07:11 AM    CHLORIDE 100 02/13/2020 07:11 AM    CO2 30 02/13/2020 07:11 AM    GLUCOSE 159 (H) 02/13/2020 07:11 AM    BUN 25 (H) 02/13/2020 07:11 AM    CREATININE 1.12 02/13/2020 07:11 AM        Medications Reconciled  CURRENT MEDICATIONS:   Current Outpatient Medications:   •  amLODIPine, 7.5 mg, Oral, DAILY  •  losartan-hydrochlorothiazide, 1 Tab, Oral, DAILY  •  Ozempic (1 MG/DOSE), 1 mg, Subcutaneous, Q7 DAYS  •  Synjardy, 1 Tab, Oral, BID  •  atorvastatin, 20 mg, Oral, QHS  •  ergocalciferol, 50,000 Units, Oral, Q7 DAYS  •  Blood Glucose Monitoring Suppl, Please dispense 100 test strips and lancets with 5 refills E11.8, Taking  •  glucose blood, OneTouch Verio strips, Taking  •  OneTouch Verio Flex System, OneTouch Verio System  USE PRN, Taking  •  glipiZIDE, 10 mg, Oral, DAILY, Taking  •  meloxicam, 7.5 mg, Oral, DAILY, Not Taking  ALLERGIES: Patient has no known  allergies.    SOCIAL HISTORY   Social History     Tobacco Use   Smoking Status Never Smoker   Smokeless Tobacco Former User     Change in weight: Stable  Exercise habits: Unable to continue regular exercise regimen d/t COVID-19  Diet: Suppressed appetite d/t increase in Ozempic dose, however still has opportunity in improving carb intake    ASSESSMENT AND PLAN    BP is slightly elevated  BP Goal 130/80    Lifestyle Recommendations From Today’s Visit:    Eating Plan: Concentrate on  Low sat/Trans fat and Low simple carb    Medication recommendations from today's visit:  Continue losartan/HCTZ 100-25 mg daily  Continue amlodipine 7.5 mg daily    Studies Ordered at Todays Visit: Referred pt to 24 hr ABPM d/t discrepancies in BP readings.  Blood Work Ordered At Today’s visit: Pt still needs to get labs done  Follow-Up: 4 weeks    Yulia Borjas, PharmD    CC:  RAFFAELE Ferreira Kristen M, M.*

## 2020-05-05 DIAGNOSIS — I10 ESSENTIAL HYPERTENSION: Primary | ICD-10-CM

## 2020-05-05 RX ORDER — AMLODIPINE BESYLATE 5 MG/1
7.5 TABLET ORAL DAILY
Qty: 45 TAB | Refills: 5 | Status: SHIPPED | OUTPATIENT
Start: 2020-05-05 | End: 2020-11-02 | Stop reason: SDUPTHER

## 2020-05-11 DIAGNOSIS — E55.9 VITAMIN D INSUFFICIENCY: ICD-10-CM

## 2020-05-12 RX ORDER — ERGOCALCIFEROL 1.25 MG/1
CAPSULE ORAL
Qty: 4 CAP | Refills: 0 | Status: SHIPPED | OUTPATIENT
Start: 2020-05-12 | End: 2020-05-19 | Stop reason: SDUPTHER

## 2020-05-14 ENCOUNTER — HOSPITAL ENCOUNTER (OUTPATIENT)
Dept: LAB | Facility: MEDICAL CENTER | Age: 50
End: 2020-05-14
Attending: NURSE PRACTITIONER
Payer: MEDICAID

## 2020-05-14 DIAGNOSIS — E78.5 DYSLIPIDEMIA: ICD-10-CM

## 2020-05-14 DIAGNOSIS — E55.9 VITAMIN D DEFICIENCY: ICD-10-CM

## 2020-05-14 DIAGNOSIS — I10 ESSENTIAL HYPERTENSION: ICD-10-CM

## 2020-05-14 LAB
ALBUMIN SERPL BCP-MCNC: 4.5 G/DL (ref 3.2–4.9)
ALBUMIN/GLOB SERPL: 1.5 G/DL
ALP SERPL-CCNC: 43 U/L (ref 30–99)
ALT SERPL-CCNC: 39 U/L (ref 2–50)
ANION GAP SERPL CALC-SCNC: 14 MMOL/L (ref 7–16)
AST SERPL-CCNC: 24 U/L (ref 12–45)
BILIRUB SERPL-MCNC: 0.8 MG/DL (ref 0.1–1.5)
BUN SERPL-MCNC: 23 MG/DL (ref 8–22)
CALCIUM SERPL-MCNC: 9.5 MG/DL (ref 8.5–10.5)
CHLORIDE SERPL-SCNC: 104 MMOL/L (ref 96–112)
CHOLEST SERPL-MCNC: 110 MG/DL (ref 100–199)
CO2 SERPL-SCNC: 24 MMOL/L (ref 20–33)
CREAT SERPL-MCNC: 0.83 MG/DL (ref 0.5–1.4)
FASTING STATUS PATIENT QL REPORTED: NORMAL
GLOBULIN SER CALC-MCNC: 3.1 G/DL (ref 1.9–3.5)
GLUCOSE SERPL-MCNC: 140 MG/DL (ref 65–99)
HDLC SERPL-MCNC: 34 MG/DL
LDLC SERPL CALC-MCNC: 43 MG/DL
POTASSIUM SERPL-SCNC: 3.4 MMOL/L (ref 3.6–5.5)
PROT SERPL-MCNC: 7.6 G/DL (ref 6–8.2)
SODIUM SERPL-SCNC: 142 MMOL/L (ref 135–145)
TRIGL SERPL-MCNC: 165 MG/DL (ref 0–149)

## 2020-05-14 PROCEDURE — 36415 COLL VENOUS BLD VENIPUNCTURE: CPT

## 2020-05-14 PROCEDURE — 80061 LIPID PANEL: CPT

## 2020-05-14 PROCEDURE — 80053 COMPREHEN METABOLIC PANEL: CPT

## 2020-05-14 PROCEDURE — 82306 VITAMIN D 25 HYDROXY: CPT

## 2020-05-15 LAB — 25(OH)D3 SERPL-MCNC: 37 NG/ML (ref 30–100)

## 2020-05-19 DIAGNOSIS — E55.9 VITAMIN D INSUFFICIENCY: ICD-10-CM

## 2020-05-19 DIAGNOSIS — E78.1 HIGH TRIGLYCERIDES: Primary | ICD-10-CM

## 2020-05-19 RX ORDER — ERGOCALCIFEROL 1.25 MG/1
50000 CAPSULE ORAL
Qty: 4 CAP | Refills: 2 | Status: SHIPPED | OUTPATIENT
Start: 2020-05-19 | End: 2020-07-08

## 2020-05-19 RX ORDER — ICOSAPENT ETHYL 1000 MG/1
2 CAPSULE ORAL 2 TIMES DAILY
Qty: 120 CAP | Refills: 5 | Status: SHIPPED | OUTPATIENT
Start: 2020-05-19 | End: 2020-12-01 | Stop reason: SDUPTHER

## 2020-05-26 ENCOUNTER — NON-PROVIDER VISIT (OUTPATIENT)
Dept: VASCULAR LAB | Facility: MEDICAL CENTER | Age: 50
End: 2020-05-26
Attending: NURSE PRACTITIONER
Payer: MEDICAID

## 2020-05-26 PROCEDURE — 93786 AMBL BP MNTR W/SW REC ONLY: CPT | Performed by: INTERNAL MEDICINE

## 2020-05-26 PROCEDURE — 93790 AMBL BP MNTR W/SW I&R: CPT | Performed by: INTERNAL MEDICINE

## 2020-05-26 PROCEDURE — 93788 AMBL BP MNTR W/SW A/R: CPT | Performed by: INTERNAL MEDICINE

## 2020-05-28 NOTE — PROGRESS NOTES
Ambulatory blood pressure monitor results reviewed  Full report under media tab  Reasonable data acquisition    Mean daytime: 122/77 consistent with well-controlled out of office blood pressure    Nocturnal dip: Appears maintained    Clinical correlation needed.  Clinical pharmacist to discuss at follow-up visit    Michael Bloch, MD  [facility]

## 2020-06-05 ENCOUNTER — TELEPHONE (OUTPATIENT)
Dept: MEDICAL GROUP | Facility: MEDICAL CENTER | Age: 50
End: 2020-06-05

## 2020-06-05 NOTE — TELEPHONE ENCOUNTER
[facility]    Left VM for pt to reschedule missed appts.    Yulia Borjas, VickiD

## 2020-06-08 DIAGNOSIS — I10 ESSENTIAL HYPERTENSION: ICD-10-CM

## 2020-06-08 RX ORDER — LOSARTAN POTASSIUM AND HYDROCHLOROTHIAZIDE 25; 100 MG/1; MG/1
TABLET ORAL
Qty: 30 TAB | Refills: 1 | Status: SHIPPED | OUTPATIENT
Start: 2020-06-08 | End: 2020-09-08 | Stop reason: SDUPTHER

## 2020-06-10 ENCOUNTER — NON-PROVIDER VISIT (OUTPATIENT)
Dept: MEDICAL GROUP | Facility: MEDICAL CENTER | Age: 50
End: 2020-06-10
Attending: NURSE PRACTITIONER
Payer: MEDICAID

## 2020-06-10 VITALS
SYSTOLIC BLOOD PRESSURE: 114 MMHG | BODY MASS INDEX: 28.63 KG/M2 | HEART RATE: 91 BPM | WEIGHT: 223 LBS | DIASTOLIC BLOOD PRESSURE: 85 MMHG

## 2020-06-10 DIAGNOSIS — E78.5 DYSLIPIDEMIA: Primary | ICD-10-CM

## 2020-06-10 DIAGNOSIS — E11.8 DM (DIABETES MELLITUS) WITH COMPLICATIONS (HCC): ICD-10-CM

## 2020-06-10 LAB
HBA1C MFR BLD: 6.4 % (ref 0–5.6)
INT CON NEG: ABNORMAL
INT CON POS: ABNORMAL

## 2020-06-10 PROCEDURE — 83036 HEMOGLOBIN GLYCOSYLATED A1C: CPT

## 2020-06-10 PROCEDURE — 99213 OFFICE O/P EST LOW 20 MIN: CPT | Performed by: PHARMACIST

## 2020-06-10 ASSESSMENT — FIBROSIS 4 INDEX: FIB4 SCORE: 0.71

## 2020-06-10 NOTE — PROGRESS NOTES
New Patient Consult Note 11:30-12:15 pm  Primary care physician: RAFFAELE Ferreira    Reason for consult: Management of Uncontrolled Type 2 Diabetes    HPI:  Myron Lilly is a 49 y.o. old patient who comes in today for evaluation of above stated problem.  Pt is doing very well and tolerating his medications very well. I went over all of his recent lab work with him and explained the progress. He is very appreciative and thankful.    His FBG are averaging 130-150 most of the times which is mostly due to the quarantine. Now he started going back to the gym and eating better plus walking every day and he thinks that will help bring his numbers to <120 without the addition of more medications.    His A1C dropped from 8.1 - 6.4 over the past 3 months and since he started with our services.    His lipid panel shows huge improvement and his BP is now at goal (Ambulatory blood pressure monitor result shows 122/77) consistent with home monitor and office readings.    He is up to date on all of his quality measures.    More dietary education was provided today.      Most Recent HbA1c:   Lab Results   Component Value Date/Time    HBA1C 6.4 (A) 06/10/2020 11:58 AM    HBA1C 8.1 (H) 02/13/2020 07:11 AM    HBA1C 7.6 (A) 02/11/2020 09:00 AM     Current Diabetes Regimen:  GLP-1 Agent: ozempic 1 mg weekly  Metformin + SGLT-2 Inhibitor: synjardy 12.5/1000 mg BID        Before Breakfast: 130-150  Before Lunch:  Before Dinner:  Before Bedtime:  Other times:  Hypoglycemia:  None      ROS:  Constitutional: -8lb  Cardiac: No palpitations or racing heart  Resp: No shortness of breath  Neuro: No numbness or tinging in feet  Endo: No heat or cold intolerance, no polyuria or polydipsia  All other systems were reviewed and were negative.    Past Medical History:  Patient Active Problem List    Diagnosis Date Noted   • Chest pain 11/12/2018     Priority: High   • Encounter to establish care 01/13/2020   • Atrophy of quadriceps  femoris muscle 10/30/2018   • Quadriceps muscle rupture, left, subsequent encounter 10/09/2018   • Chronic right shoulder pain 07/03/2018   • Controlled type 2 diabetes mellitus without complication, without long-term current use of insulin ([facility]) 07/03/2018   • Essential hypertension 07/03/2018   • Pure hypercholesterolemia 07/03/2018   • Obesity (BMI 30-39.9) 07/03/2018       Past Surgical History:  Past Surgical History:   Procedure Laterality Date   • SHOULDER ARTHROSCOPY Right 11/5/2018    Procedure: SHOULDER ARTHROSCOPY;  Surgeon: Wesly Weinstein M.D.;  Location: [facility];  Service: Orthopedics   • SHOULDER DECOMPRESSION Right 11/5/2018    Procedure: SHOULDER DECOMPRESSION-  W/MINI OPEN SUBACROMIAL;  Surgeon: Wesly Weinstein M.D.;  Location: [facility];  Service: Orthopedics   • CLAVICLE DISTAL EXCISION Right 11/5/2018    Procedure: CLAVICLE DISTAL EXCISION, LATERAL CORACOID DECOMPRESSION;  Surgeon: Wesly Weinstein M.D.;  Location: [facility];  Service: Orthopedics   • SHOULDER ARTHROSCOPY W/ BICIPITAL TENODESIS REPAIR Right 11/5/2018    Procedure: SHOULDER ARTHROSCOPY W/ BICIPITAL TENODESIS REPAIR;  Surgeon: Wesly Weinstein M.D.;  Location: [facility];  Service: Orthopedics   • ELBOW ORIF Left 1980   • CHOLECYSTECTOMY      approx 2004       Allergies:  Patient has no known allergies.    Social History:  Social History     Socioeconomic History   • Marital status:      Spouse name: Not on file   • Number of children: Not on file   • Years of education: Not on file   • Highest education level: Not on file   Occupational History   • Not on file   Social Needs   • Financial resource strain: Not on file   • Food insecurity     Worry: Not on file     Inability: Not on file   • Transportation needs     Medical: Not on file     Non-medical: Not on file   Tobacco Use   • Smoking status: Never Smoker   • Smokeless tobacco: Former User   Substance  and Sexual Activity   • Alcohol use: Yes     Comment: 2 per month   • Drug use: No   • Sexual activity: Yes     Partners: Female   Lifestyle   • Physical activity     Days per week: Not on file     Minutes per session: Not on file   • Stress: Not on file   Relationships   • Social connections     Talks on phone: Not on file     Gets together: Not on file     Attends Mandaen service: Not on file     Active member of club or organization: Not on file     Attends meetings of clubs or organizations: Not on file     Relationship status: Not on file   • Intimate partner violence     Fear of current or ex partner: Not on file     Emotionally abused: Not on file     Physically abused: Not on file     Forced sexual activity: Not on file   Other Topics Concern   •  Service Yes   • Blood Transfusions Not Asked   • Caffeine Concern Not Asked   • Occupational Exposure Not Asked   • Hobby Hazards Not Asked   • Sleep Concern Not Asked   • Stress Concern Not Asked   • Weight Concern Yes   • Special Diet Not Asked   • Back Care Not Asked   • Exercise No   • Bike Helmet Not Asked   • Seat Belt Not Asked   • Self-Exams Not Asked   Social History Narrative   • Not on file       Family History:  Family History   Problem Relation Age of Onset   • Diabetes Mother    • Diabetes Father    • Hypertension Father    • Diabetes Brother    • Cancer Child         ALL at 16 months       Medications:    Current Outpatient Medications:   •  losartan-hydrochlorothiazide (HYZAAR) 100-25 MG per tablet, TAKE 1 TABLET ORALLY ONCE DAILY, Disp: 30 Tab, Rfl: 1  •  Icosapent Ethyl (VASCEPA) 1 g Cap, Take 2 Caps by mouth 2 Times a Day., Disp: 120 Cap, Rfl: 5  •  vitamin D, Ergocalciferol, (DRISDOL) 1.25 MG (51648 UT) Cap capsule, Take 1 Cap by mouth every 7 days., Disp: 4 Cap, Rfl: 2  •  amLODIPine (NORVASC) 5 MG Tab, Take 1.5 Tabs by mouth every day., Disp: 45 Tab, Rfl: 5  •  Semaglutide, 1 MG/DOSE, (OZEMPIC, 1 MG/DOSE,) 2 MG/1.5ML Solution  Pen-injector, Inject 1 mg as instructed every 7 days., Disp: 4 PEN, Rfl: 2  •  Empagliflozin-metFORMIN HCl (SYNJARDY) 12.5-1000 MG Tab, Take 1 Tab by mouth 2 Times a Day., Disp: 60 Tab, Rfl: 5  •  atorvastatin (LIPITOR) 20 MG Tab, Take 1 Tab by mouth every bedtime., Disp: 30 Tab, Rfl: 5  •  Blood Glucose Monitoring Suppl w/Device Kit, Please dispense 100 test strips and lancets with 5 refills E11.8, Disp: 1 Kit, Rfl: 0  •  glucose blood strip, OneTouch Verio strips, Disp: , Rfl:   •  Blood Glucose Monitoring Suppl (ONETOUCH VERIO FLEX SYSTEM) w/Device Kit, OneTouch Verio System  USE PRN, Disp: , Rfl:   •  meloxicam (MOBIC) 7.5 MG Tab, Take 7.5 mg by mouth every day., Disp: , Rfl:     Labs: Reviewed    Physical Examination:  Vital signs: /85   Pulse 91   Wt 101.2 kg (223 lb)   BMI 28.63 kg/m²  Body mass index is 28.63 kg/m².  General: No apparent distress, cooperative  Eyes: No scleral icterus or discharge  ENMT: Normal on external inspection of nose, lips, normal thyroid exam  Neck: No abnormal masses on inspection  Resp: Normal effort, clear to auscultation bilaterally   CVS: Regular rate and rhythm, S1 S2 normal, no murmur   Extremities: No edema  Abdomen: abdominal obesity present  Neuro: Alert and oriented  Skin: No rash  Psych: Normal mood and affect, intact memory and able to make informed decisions    Assessment and Plan:    Continue current therapy    Increase exercise and better diet, if still not at goal (FBG<120) we will consider adding another class of medication.    Call with any questions or if FBG is >160  Return in about 2 months (around 8/18/2020).    Thank you for allowing me to participate in the care of this patient.    Judi Henderson, PharmD  06/10/20    CC:   RAFFAELE Ferreira

## 2020-08-05 PROCEDURE — RXMED WILLOW AMBULATORY MEDICATION CHARGE: Performed by: NURSE PRACTITIONER

## 2020-08-06 ENCOUNTER — PHARMACY VISIT (OUTPATIENT)
Dept: PHARMACY | Facility: MEDICAL CENTER | Age: 50
End: 2020-08-06
Payer: COMMERCIAL

## 2020-08-06 PROCEDURE — RXMED WILLOW AMBULATORY MEDICATION CHARGE: Performed by: NURSE PRACTITIONER

## 2020-08-11 ENCOUNTER — PHARMACY VISIT (OUTPATIENT)
Dept: PHARMACY | Facility: MEDICAL CENTER | Age: 50
End: 2020-08-11
Payer: COMMERCIAL

## 2020-08-11 PROCEDURE — RXMED WILLOW AMBULATORY MEDICATION CHARGE: Performed by: NURSE PRACTITIONER

## 2020-08-11 RX ORDER — ATORVASTATIN CALCIUM 20 MG/1
20 TABLET, FILM COATED ORAL
Qty: 30 TAB | Refills: 5 | Status: SHIPPED | OUTPATIENT
Start: 2020-08-11 | End: 2021-01-26 | Stop reason: SDUPTHER

## 2020-08-11 RX ORDER — ERGOCALCIFEROL 1.25 MG/1
CAPSULE ORAL
Qty: 4 CAP | Refills: 1 | Status: SHIPPED | OUTPATIENT
Start: 2020-05-12 | End: 2020-08-11 | Stop reason: SDUPTHER

## 2020-08-11 RX ORDER — ERGOCALCIFEROL 1.25 MG/1
CAPSULE ORAL
Qty: 4 CAP | Refills: 3 | Status: SHIPPED | OUTPATIENT
Start: 2020-08-11 | End: 2020-12-15

## 2020-08-18 ENCOUNTER — NON-PROVIDER VISIT (OUTPATIENT)
Dept: MEDICAL GROUP | Facility: MEDICAL CENTER | Age: 50
End: 2020-08-18
Attending: NURSE PRACTITIONER
Payer: MEDICAID

## 2020-08-18 VITALS
BODY MASS INDEX: 28.63 KG/M2 | SYSTOLIC BLOOD PRESSURE: 121 MMHG | HEART RATE: 67 BPM | DIASTOLIC BLOOD PRESSURE: 76 MMHG | WEIGHT: 223 LBS

## 2020-08-18 DIAGNOSIS — E11.9 TYPE 2 DIABETES MELLITUS WITHOUT COMPLICATION, WITHOUT LONG-TERM CURRENT USE OF INSULIN (HCC): Primary | ICD-10-CM

## 2020-08-18 PROCEDURE — 99213 OFFICE O/P EST LOW 20 MIN: CPT | Performed by: PHARMACIST

## 2020-08-18 ASSESSMENT — FIBROSIS 4 INDEX: FIB4 SCORE: 0.73

## 2020-08-18 NOTE — PROGRESS NOTES
New Patient Consult Note 10:00-10:45 am  Primary care physician: RAFFAELE Ferreira    Reason for consult: Management of Uncontrolled Type 2 Diabetes    HPI:  Myron Lilly is a 50 y.o. old patient who comes in today for evaluation of above stated problem.  Patient says that he is doing better, eating better and exercising more and that had some impact on his numbers. He is reporting FBG in the 150s and we tried running an A1C in the office but the machine wasn't functioning right so he is going to get that done along with other labs ASAP.    We discussed that he is still above his target according to the numbers he is reporting and he wanted to work more on diet and exercise prior to adding more medications.    More dietary education was provided.    His BP was very good in the office and it has been stable.    Most Recent HbA1c:   Lab Results   Component Value Date/Time    HBA1C 6.4 (A) 06/10/2020 11:58 AM    HBA1C 8.1 (H) 02/13/2020 07:11 AM    HBA1C 7.6 (A) 02/11/2020 09:00 AM   Current Diabetes Regimen:    GLP-1 Agent: ozempic 1 mg weekly  Metformin + SGLT-2 Inhibitor: synjardy 12.5/1000 mg BID       Before Breakfast: 150s  Before Lunch:  Before Dinner:  Before Bedtime:  Other times:  Hypoglycemia:  None      ROS:  Constitutional: No weight loss  Cardiac: No palpitations or racing heart  Resp: No shortness of breath  Neuro: No numbness or tinging in feet  Endo: No heat or cold intolerance, no polyuria or polydipsia  All other systems were reviewed and were negative.    Past Medical History:  Patient Active Problem List    Diagnosis Date Noted   • Chest pain 11/12/2018     Priority: High   • Encounter to establish care 01/13/2020   • Atrophy of quadriceps femoris muscle 10/30/2018   • Quadriceps muscle rupture, left, subsequent encounter 10/09/2018   • Chronic right shoulder pain 07/03/2018   • Controlled type 2 diabetes mellitus without complication, without long-term current use of insulin (HCC)  07/03/2018   • Essential hypertension 07/03/2018   • Pure hypercholesterolemia 07/03/2018   • Obesity (BMI 30-39.9) 07/03/2018       Past Surgical History:  Past Surgical History:   Procedure Laterality Date   • SHOULDER ARTHROSCOPY Right 11/5/2018    Procedure: SHOULDER ARTHROSCOPY;  Surgeon: Wesly Weinstein M.D.;  Location: [facility];  Service: Orthopedics   • SHOULDER DECOMPRESSION Right 11/5/2018    Procedure: SHOULDER DECOMPRESSION-  W/MINI OPEN SUBACROMIAL;  Surgeon: Wesly Weinstein M.D.;  Location: [facility];  Service: Orthopedics   • CLAVICLE DISTAL EXCISION Right 11/5/2018    Procedure: CLAVICLE DISTAL EXCISION, LATERAL CORACOID DECOMPRESSION;  Surgeon: Wesly Weinstein M.D.;  Location: [facility];  Service: Orthopedics   • SHOULDER ARTHROSCOPY W/ BICIPITAL TENODESIS REPAIR Right 11/5/2018    Procedure: SHOULDER ARTHROSCOPY W/ BICIPITAL TENODESIS REPAIR;  Surgeon: Wesly Weinstein M.D.;  Location: [facility];  Service: Orthopedics   • ELBOW ORIF Left 1980   • CHOLECYSTECTOMY      approx 2004       Allergies:  Patient has no known allergies.    Social History:  Social History     Socioeconomic History   • Marital status:      Spouse name: Not on file   • Number of children: Not on file   • Years of education: Not on file   • Highest education level: Not on file   Occupational History   • Not on file   Social Needs   • Financial resource strain: Not on file   • Food insecurity     Worry: Not on file     Inability: Not on file   • Transportation needs     Medical: Not on file     Non-medical: Not on file   Tobacco Use   • Smoking status: Never Smoker   • Smokeless tobacco: Former User   Substance and Sexual Activity   • Alcohol use: Yes     Comment: 2 per month   • Drug use: No   • Sexual activity: Yes     Partners: Female   Lifestyle   • Physical activity     Days per week: Not on file     Minutes per session: Not on file   • Stress: Not  on file   Relationships   • Social connections     Talks on phone: Not on file     Gets together: Not on file     Attends Shinto service: Not on file     Active member of club or organization: Not on file     Attends meetings of clubs or organizations: Not on file     Relationship status: Not on file   • Intimate partner violence     Fear of current or ex partner: Not on file     Emotionally abused: Not on file     Physically abused: Not on file     Forced sexual activity: Not on file   Other Topics Concern   •  Service Yes   • Blood Transfusions Not Asked   • Caffeine Concern Not Asked   • Occupational Exposure Not Asked   • Hobby Hazards Not Asked   • Sleep Concern Not Asked   • Stress Concern Not Asked   • Weight Concern Yes   • Special Diet Not Asked   • Back Care Not Asked   • Exercise No   • Bike Helmet Not Asked   • Seat Belt Not Asked   • Self-Exams Not Asked   Social History Narrative   • Not on file       Family History:  Family History   Problem Relation Age of Onset   • Diabetes Mother    • Diabetes Father    • Hypertension Father    • Diabetes Brother    • Cancer Child         ALL at 16 months       Medications:    Current Outpatient Medications:   •  atorvastatin (LIPITOR) 20 MG Tab, Take 1 Tab by mouth every bedtime., Disp: 30 Tab, Rfl: 5  •  vitamin D, Ergocalciferol, (DRISDOL) 1.25 MG (88041 UT) Cap capsule, TAKE 1 CAPSULE ORALLY EVERY SEVEN (7) DAYS, Disp: 4 Cap, Rfl: 3  •  Semaglutide, 1 MG/DOSE, (OZEMPIC, 1 MG/DOSE,) 2 MG/1.5ML Solution Pen-injector, INJECT 1MG SUBCUTANEOUSLY EVERY SEVEN (7) DAYS AS DIRECTED, Disp: 3 mL, Rfl: 6  •  losartan-hydrochlorothiazide (HYZAAR) 100-25 MG per tablet, TAKE 1 TABLET ORALLY ONCE DAILY, Disp: 30 Tab, Rfl: 1  •  Icosapent Ethyl (VASCEPA) 1 g Cap, Take 2 Caps by mouth 2 Times a Day., Disp: 120 Cap, Rfl: 5  •  amLODIPine (NORVASC) 5 MG Tab, Take 1.5 Tabs by mouth every day., Disp: 45 Tab, Rfl: 5  •  Empagliflozin-metFORMIN HCl (SYNJARDY) 12.5-1000 MG  Tab, Take 1 Tab by mouth 2 Times a Day., Disp: 60 Tab, Rfl: 5  •  Blood Glucose Monitoring Suppl w/Device Kit, Please dispense 100 test strips and lancets with 5 refills E11.8, Disp: 1 Kit, Rfl: 0  •  glucose blood strip, OneTouch Verio strips, Disp: , Rfl:   •  Blood Glucose Monitoring Suppl (ONETOUCH VERIO FLEX SYSTEM) w/Device Kit, OneTouch Verio System  USE PRN, Disp: , Rfl:   •  meloxicam (MOBIC) 7.5 MG Tab, Take 7.5 mg by mouth every day., Disp: , Rfl:     Labs: Reviewed    Physical Examination:  Vital signs: /76   Pulse 67   Wt 101.2 kg (223 lb)   BMI 28.63 kg/m²  Body mass index is 28.63 kg/m².  General: No apparent distress, cooperative  Eyes: No scleral icterus or discharge  ENMT: Normal on external inspection of nose, lips, normal thyroid exam  Neck: No abnormal masses on inspection  Resp: Normal effort, clear to auscultation bilaterally   CVS: Regular rate and rhythm, S1 S2 normal, no murmur   Extremities: No edema  Abdomen: abdominal obesity present  Neuro: Alert and oriented  Skin: No rash  Psych: Normal mood and affect, intact memory and able to make informed decisions    Assessment and Plan:  Follow the dietary changes discussed today  Increase physical activity time  Get labs done    If A1C and FBG is not lower by next visit we might add another pharmacotherapy option.      Return in about 14 weeks (around 11/24/2020).    Thank you for allowing me to participate in the care of this patient.    Judi Henderson, PharmD  08/18/20    CC:   RAFFAELE Ferreira

## 2020-09-05 ENCOUNTER — HOSPITAL ENCOUNTER (OUTPATIENT)
Dept: LAB | Facility: MEDICAL CENTER | Age: 50
End: 2020-09-05
Attending: NURSE PRACTITIONER
Payer: MEDICAID

## 2020-09-05 DIAGNOSIS — E11.9 TYPE 2 DIABETES MELLITUS WITHOUT COMPLICATION, WITHOUT LONG-TERM CURRENT USE OF INSULIN (HCC): ICD-10-CM

## 2020-09-05 DIAGNOSIS — E78.5 DYSLIPIDEMIA: ICD-10-CM

## 2020-09-05 LAB
CHOLEST SERPL-MCNC: 104 MG/DL (ref 100–199)
CREAT UR-MCNC: 179.06 MG/DL
EST. AVERAGE GLUCOSE BLD GHB EST-MCNC: 131 MG/DL
HBA1C MFR BLD: 6.2 % (ref 0–5.6)
HDLC SERPL-MCNC: 41 MG/DL
LDLC SERPL CALC-MCNC: 49 MG/DL
MICROALBUMIN UR-MCNC: 1.6 MG/DL
MICROALBUMIN/CREAT UR: 9 MG/G (ref 0–30)
TRIGL SERPL-MCNC: 71 MG/DL (ref 0–149)

## 2020-09-05 PROCEDURE — 80061 LIPID PANEL: CPT

## 2020-09-05 PROCEDURE — 83036 HEMOGLOBIN GLYCOSYLATED A1C: CPT

## 2020-09-05 PROCEDURE — 82043 UR ALBUMIN QUANTITATIVE: CPT

## 2020-09-05 PROCEDURE — 82570 ASSAY OF URINE CREATININE: CPT

## 2020-09-05 PROCEDURE — 36415 COLL VENOUS BLD VENIPUNCTURE: CPT

## 2020-09-08 ENCOUNTER — PHARMACY VISIT (OUTPATIENT)
Dept: PHARMACY | Facility: MEDICAL CENTER | Age: 50
End: 2020-09-08
Payer: COMMERCIAL

## 2020-09-08 DIAGNOSIS — I10 ESSENTIAL HYPERTENSION: ICD-10-CM

## 2020-09-08 PROCEDURE — RXMED WILLOW AMBULATORY MEDICATION CHARGE: Performed by: NURSE PRACTITIONER

## 2020-09-08 RX ORDER — LOSARTAN POTASSIUM AND HYDROCHLOROTHIAZIDE 25; 100 MG/1; MG/1
TABLET ORAL
Qty: 30 TAB | Refills: 6 | Status: SHIPPED | OUTPATIENT
Start: 2020-09-08 | End: 2021-03-18 | Stop reason: SDUPTHER

## 2020-10-05 DIAGNOSIS — E11.8 DM (DIABETES MELLITUS) WITH COMPLICATIONS (HCC): Primary | ICD-10-CM

## 2020-10-05 PROCEDURE — RXMED WILLOW AMBULATORY MEDICATION CHARGE: Performed by: NURSE PRACTITIONER

## 2020-10-05 RX ORDER — EMPAGLIFLOZIN AND METFORMIN HYDROCHLORIDE 12.5; 1 MG/1; MG/1
1 TABLET ORAL
Qty: 60 TAB | Refills: 5 | Status: SHIPPED | OUTPATIENT
Start: 2020-10-05 | End: 2021-03-18 | Stop reason: SDUPTHER

## 2020-10-06 ENCOUNTER — PHARMACY VISIT (OUTPATIENT)
Dept: PHARMACY | Facility: MEDICAL CENTER | Age: 50
End: 2020-10-06
Payer: COMMERCIAL

## 2020-10-30 PROCEDURE — RXMED WILLOW AMBULATORY MEDICATION CHARGE: Performed by: NURSE PRACTITIONER

## 2020-11-02 DIAGNOSIS — I10 ESSENTIAL HYPERTENSION: ICD-10-CM

## 2020-11-02 PROCEDURE — RXMED WILLOW AMBULATORY MEDICATION CHARGE: Performed by: NURSE PRACTITIONER

## 2020-11-02 RX ORDER — AMLODIPINE BESYLATE 5 MG/1
7.5 TABLET ORAL DAILY
Qty: 45 TAB | Refills: 5 | Status: SHIPPED | OUTPATIENT
Start: 2020-11-02 | End: 2021-04-21 | Stop reason: SDUPTHER

## 2020-11-04 ENCOUNTER — PHARMACY VISIT (OUTPATIENT)
Dept: PHARMACY | Facility: MEDICAL CENTER | Age: 50
End: 2020-11-04
Payer: COMMERCIAL

## 2020-11-25 DIAGNOSIS — E11.9 CONTROLLED TYPE 2 DIABETES MELLITUS WITHOUT COMPLICATION, WITHOUT LONG-TERM CURRENT USE OF INSULIN (HCC): ICD-10-CM

## 2020-11-25 PROCEDURE — RXMED WILLOW AMBULATORY MEDICATION CHARGE: Performed by: NURSE PRACTITIONER

## 2020-11-25 RX ORDER — SEMAGLUTIDE 1.34 MG/ML
INJECTION, SOLUTION SUBCUTANEOUS
Qty: 3 ML | Refills: 5 | Status: SHIPPED | OUTPATIENT
Start: 2020-11-25 | End: 2021-04-27 | Stop reason: SDUPTHER

## 2020-11-25 RX ORDER — ERGOCALCIFEROL 1.25 MG/1
CAPSULE ORAL
Qty: 4 CAP | Refills: 0 | OUTPATIENT
Start: 2020-11-25 | End: 2021-11-25

## 2020-11-25 NOTE — TELEPHONE ENCOUNTER
Received request via: Patient    Was the patient seen in the last year in this department? No     Does the patient have an active prescription (recently filled or refills available) for medication(s) requested? No   English

## 2020-11-30 PROCEDURE — RXMED WILLOW AMBULATORY MEDICATION CHARGE: Performed by: NURSE PRACTITIONER

## 2020-12-01 ENCOUNTER — PHARMACY VISIT (OUTPATIENT)
Dept: PHARMACY | Facility: MEDICAL CENTER | Age: 50
End: 2020-12-01
Payer: COMMERCIAL

## 2020-12-01 DIAGNOSIS — E78.1 HIGH TRIGLYCERIDES: ICD-10-CM

## 2020-12-01 DIAGNOSIS — E55.9 VITAMIN D DEFICIENCY: Primary | ICD-10-CM

## 2020-12-01 PROCEDURE — RXMED WILLOW AMBULATORY MEDICATION CHARGE: Performed by: NURSE PRACTITIONER

## 2020-12-01 RX ORDER — ERGOCALCIFEROL 1.25 MG/1
50000 CAPSULE ORAL
Qty: 4 CAP | Refills: 0 | Status: SHIPPED | OUTPATIENT
Start: 2020-12-01 | End: 2020-12-15 | Stop reason: SDUPTHER

## 2020-12-01 RX ORDER — ICOSAPENT ETHYL 1000 MG/1
2 CAPSULE ORAL 2 TIMES DAILY
Qty: 120 CAP | Refills: 5 | Status: SHIPPED | OUTPATIENT
Start: 2020-12-01 | End: 2021-06-01 | Stop reason: SDUPTHER

## 2020-12-12 ENCOUNTER — HOSPITAL ENCOUNTER (OUTPATIENT)
Dept: LAB | Facility: MEDICAL CENTER | Age: 50
End: 2020-12-12
Attending: NURSE PRACTITIONER
Payer: MEDICAID

## 2020-12-12 DIAGNOSIS — E55.9 VITAMIN D DEFICIENCY: ICD-10-CM

## 2020-12-12 LAB — 25(OH)D3 SERPL-MCNC: 47 NG/ML (ref 30–100)

## 2020-12-12 PROCEDURE — 82306 VITAMIN D 25 HYDROXY: CPT

## 2020-12-12 PROCEDURE — 36415 COLL VENOUS BLD VENIPUNCTURE: CPT

## 2020-12-15 ENCOUNTER — NON-PROVIDER VISIT (OUTPATIENT)
Dept: MEDICAL GROUP | Facility: MEDICAL CENTER | Age: 50
End: 2020-12-15
Attending: FAMILY MEDICINE
Payer: MEDICAID

## 2020-12-15 VITALS
WEIGHT: 207 LBS | SYSTOLIC BLOOD PRESSURE: 119 MMHG | BODY MASS INDEX: 26.58 KG/M2 | DIASTOLIC BLOOD PRESSURE: 82 MMHG | HEART RATE: 66 BPM

## 2020-12-15 DIAGNOSIS — E78.5 DYSLIPIDEMIA: ICD-10-CM

## 2020-12-15 DIAGNOSIS — E11.8 DM (DIABETES MELLITUS) WITH COMPLICATIONS (HCC): ICD-10-CM

## 2020-12-15 DIAGNOSIS — E55.9 VITAMIN D DEFICIENCY: Primary | ICD-10-CM

## 2020-12-15 LAB
HBA1C MFR BLD: 5.6 % (ref 0–5.6)
INT CON NEG: NORMAL
INT CON POS: NORMAL

## 2020-12-15 PROCEDURE — 99213 OFFICE O/P EST LOW 20 MIN: CPT | Performed by: PHARMACIST

## 2020-12-15 PROCEDURE — 83036 HEMOGLOBIN GLYCOSYLATED A1C: CPT

## 2020-12-15 RX ORDER — ERGOCALCIFEROL 1.25 MG/1
50000 CAPSULE ORAL
Qty: 2 CAP | Refills: 5 | Status: SHIPPED | OUTPATIENT
Start: 2020-12-15 | End: 2021-05-25 | Stop reason: SDUPTHER

## 2020-12-15 ASSESSMENT — FIBROSIS 4 INDEX: FIB4 SCORE: 0.73

## 2020-12-15 NOTE — PROGRESS NOTES
New Patient Consult Note 10:30-11:15 am  Primary care physician: RAFFAELE Ferreira    Reason for consult: Management of Uncontrolled Type 2 Diabetes    HPI:  Myron Lilly is a 50 y.o. old patient who comes in today for evaluation of above stated problem.  Pt is doing very well. He said that he has been doing ok on his diet but he had 2 weeks when he was drinking mountain dew every day but then he stopped.    He is up to date on all of his labs. He is due for a retinal exam and foot exam.    He reports increase in his energy level and being more physically active.  His BP is very stable and at goal.    His A1C was done in the office today and it shows a continuous improvement.  Most Recent HbA1c:   Lab Results   Component Value Date/Time    HBA1C 5.6 12/15/2020 10:30 AM    HBA1C 6.2 (H) 09/05/2020 09:37 AM    HBA1C 6.4 (A) 06/10/2020 11:58 AM       Current Diabetes Regimen:  GLP-1 Agent: ozempic 1 mg weekly  Metformin + SGLT-2 Inhibitor: synjardy 12.5/1000 mg BID         Before Breakfast: <120  Before Lunch:  Before Dinner:  Before Bedtime:  Other times:  Hypoglycemia:  None      ROS:  Constitutional: No weight loss  Cardiac: No palpitations or racing heart  Resp: No shortness of breath  Neuro: No numbness or tinging in feet  Endo: No heat or cold intolerance, no polyuria or polydipsia  All other systems were reviewed and were negative.    Past Medical History:  Patient Active Problem List    Diagnosis Date Noted   • Chest pain 11/12/2018     Priority: High   • Encounter to establish care 01/13/2020   • Atrophy of quadriceps femoris muscle 10/30/2018   • Quadriceps muscle rupture, left, subsequent encounter 10/09/2018   • Chronic right shoulder pain 07/03/2018   • Controlled type 2 diabetes mellitus without complication, without long-term current use of insulin (HCC) 07/03/2018   • Essential hypertension 07/03/2018   • Pure hypercholesterolemia 07/03/2018   • Obesity (BMI 30-39.9) 07/03/2018       Past  Surgical History:  Past Surgical History:   Procedure Laterality Date   • SHOULDER ARTHROSCOPY Right 11/5/2018    Procedure: SHOULDER ARTHROSCOPY;  Surgeon: Wesly Weinstein M.D.;  Location: [facility];  Service: Orthopedics   • SHOULDER DECOMPRESSION Right 11/5/2018    Procedure: SHOULDER DECOMPRESSION-  W/MINI OPEN SUBACROMIAL;  Surgeon: Wesly Weinstein M.D.;  Location: [facility];  Service: Orthopedics   • CLAVICLE DISTAL EXCISION Right 11/5/2018    Procedure: CLAVICLE DISTAL EXCISION, LATERAL CORACOID DECOMPRESSION;  Surgeon: Wesly Weinstein M.D.;  Location: [facility];  Service: Orthopedics   • SHOULDER ARTHROSCOPY W/ BICIPITAL TENODESIS REPAIR Right 11/5/2018    Procedure: SHOULDER ARTHROSCOPY W/ BICIPITAL TENODESIS REPAIR;  Surgeon: Wesly Weinstein M.D.;  Location: [facility];  Service: Orthopedics   • ELBOW ORIF Left 1980   • CHOLECYSTECTOMY      approx 2004       Allergies:  Patient has no known allergies.    Social History:  Social History     Socioeconomic History   • Marital status:      Spouse name: Not on file   • Number of children: Not on file   • Years of education: Not on file   • Highest education level: Not on file   Occupational History   • Not on file   Social Needs   • Financial resource strain: Not on file   • Food insecurity     Worry: Not on file     Inability: Not on file   • Transportation needs     Medical: Not on file     Non-medical: Not on file   Tobacco Use   • Smoking status: Never Smoker   • Smokeless tobacco: Former User   Substance and Sexual Activity   • Alcohol use: Yes     Comment: 2 per month   • Drug use: No   • Sexual activity: Yes     Partners: Female   Lifestyle   • Physical activity     Days per week: Not on file     Minutes per session: Not on file   • Stress: Not on file   Relationships   • Social connections     Talks on phone: Not on file     Gets together: Not on file     Attends Cheondoism  service: Not on file     Active member of club or organization: Not on file     Attends meetings of clubs or organizations: Not on file     Relationship status: Not on file   • Intimate partner violence     Fear of current or ex partner: Not on file     Emotionally abused: Not on file     Physically abused: Not on file     Forced sexual activity: Not on file   Other Topics Concern   •  Service Yes   • Blood Transfusions Not Asked   • Caffeine Concern Not Asked   • Occupational Exposure Not Asked   • Hobby Hazards Not Asked   • Sleep Concern Not Asked   • Stress Concern Not Asked   • Weight Concern Yes   • Special Diet Not Asked   • Back Care Not Asked   • Exercise No   • Bike Helmet Not Asked   • Seat Belt Not Asked   • Self-Exams Not Asked   Social History Narrative   • Not on file       Family History:  Family History   Problem Relation Age of Onset   • Diabetes Mother    • Diabetes Father    • Hypertension Father    • Diabetes Brother    • Cancer Child         ALL at 16 months       Medications:    Current Outpatient Medications:   •  ergocalciferol (DRISDOL) 87670 UNIT capsule, Take 1 Capsule by mouth every 14 days., Disp: 2 Cap, Rfl: 5  •  Icosapent Ethyl (VASCEPA) 1 g Cap, Take 2 Caps by mouth 2 Times a Day., Disp: 120 Cap, Rfl: 5  •  Semaglutide, 1 MG/DOSE, (OZEMPIC, 1 MG/DOSE,) 2 MG/1.5ML Solution Pen-injector, INJECT 1MG SUBCUTANEOUSLY EVERY SEVEN (7) DAYS AS DIRECTED, Disp: 3 mL, Rfl: 5  •  amLODIPine (NORVASC) 5 MG Tab, Take 1.5 Tabs by mouth every day., Disp: 45 Tab, Rfl: 5  •  Empagliflozin-metFORMIN HCl (SYNJARDY) 12.5-1000 MG Tab, Take 1 Tab by mouth 2 Times a Day., Disp: 60 Tab, Rfl: 5  •  losartan-hydrochlorothiazide (HYZAAR) 100-25 MG per tablet, TAKE 1 TABLET ORALLY ONCE DAILY, Disp: 30 Tab, Rfl: 6  •  atorvastatin (LIPITOR) 20 MG Tab, Take 1 Tab by mouth every bedtime., Disp: 30 Tab, Rfl: 5  •  Blood Glucose Monitoring Suppl w/Device Kit, Please dispense 100 test strips and lancets with 5  refills E11.8, Disp: 1 Kit, Rfl: 0  •  glucose blood strip, OneTouch Verio strips, Disp: , Rfl:   •  Blood Glucose Monitoring Suppl (ONETOUCH VERIO FLEX SYSTEM) w/Device Kit, OneTouch Verio System  USE PRN, Disp: , Rfl:   •  meloxicam (MOBIC) 7.5 MG Tab, Take 7.5 mg by mouth every day., Disp: , Rfl:     Labs: Reviewed    Physical Examination:  Vital signs: /82   Pulse 66   Wt 93.9 kg (207 lb)   BMI 26.58 kg/m²  Body mass index is 26.58 kg/m².  General: No apparent distress, cooperative  Eyes: No scleral icterus or discharge  ENMT: Normal on external inspection of nose, lips, normal thyroid exam  Neck: No abnormal masses on inspection  Resp: Normal effort, clear to auscultation bilaterally   CVS: Regular rate and rhythm, S1 S2 normal, no murmur   Extremities: No edema  Abdomen: abdominal obesity present  Neuro: Alert and oriented  Skin: No rash  Psych: Normal mood and affect, intact memory and able to make informed decisions    Assessment and Plan:    Continue current therapy    Get labs done prior to next visit.    Return in about 4 months (around 4/14/2021).    Thank you for allowing me to participate in the care of this patient.    Judi Henderson, PharmD BC-ADM Board Certified Advance Diabetes Specialist  12/15/20    CC:   RAFFAELE Ferreira

## 2020-12-23 PROCEDURE — RXMED WILLOW AMBULATORY MEDICATION CHARGE: Performed by: NURSE PRACTITIONER

## 2020-12-24 NOTE — RESULT ENCOUNTER NOTE
Chuck Sanches–  I got your lab results.  Your vitamin D level has returned to normal.  In order to maintain this I recommend taking an over-the-counter vitamin D3 supplement, 2000 units daily.  Unfortunately Medicaid will not pay for this.  Vitamin D is important for bone health, our mood, and our immune system.  Please let me know if you have any questions or concerns.  Roxana

## 2020-12-28 PROCEDURE — RXMED WILLOW AMBULATORY MEDICATION CHARGE: Performed by: FAMILY MEDICINE

## 2020-12-30 ENCOUNTER — PHARMACY VISIT (OUTPATIENT)
Dept: PHARMACY | Facility: MEDICAL CENTER | Age: 50
End: 2020-12-30
Payer: COMMERCIAL

## 2020-12-30 PROCEDURE — RXMED WILLOW AMBULATORY MEDICATION CHARGE: Performed by: NURSE PRACTITIONER

## 2021-01-06 ENCOUNTER — PHARMACY VISIT (OUTPATIENT)
Dept: PHARMACY | Facility: MEDICAL CENTER | Age: 51
End: 2021-01-06
Payer: COMMERCIAL

## 2021-01-15 PROCEDURE — RXMED WILLOW AMBULATORY MEDICATION CHARGE: Performed by: NURSE PRACTITIONER

## 2021-01-20 PROCEDURE — RXMED WILLOW AMBULATORY MEDICATION CHARGE: Performed by: NURSE PRACTITIONER

## 2021-01-22 PROCEDURE — RXMED WILLOW AMBULATORY MEDICATION CHARGE: Performed by: FAMILY MEDICINE

## 2021-01-26 ENCOUNTER — PHARMACY VISIT (OUTPATIENT)
Dept: PHARMACY | Facility: MEDICAL CENTER | Age: 51
End: 2021-01-26
Payer: COMMERCIAL

## 2021-01-26 DIAGNOSIS — E78.5 DYSLIPIDEMIA: Primary | ICD-10-CM

## 2021-01-26 PROCEDURE — RXMED WILLOW AMBULATORY MEDICATION CHARGE: Performed by: NURSE PRACTITIONER

## 2021-01-26 RX ORDER — ATORVASTATIN CALCIUM 20 MG/1
20 TABLET, FILM COATED ORAL
Qty: 30 TAB | Refills: 5 | Status: SHIPPED | OUTPATIENT
Start: 2021-01-26 | End: 2021-06-28 | Stop reason: SDUPTHER

## 2021-02-02 ENCOUNTER — PHARMACY VISIT (OUTPATIENT)
Dept: PHARMACY | Facility: MEDICAL CENTER | Age: 51
End: 2021-02-02
Payer: COMMERCIAL

## 2021-02-15 PROCEDURE — RXMED WILLOW AMBULATORY MEDICATION CHARGE: Performed by: NURSE PRACTITIONER

## 2021-02-16 PROCEDURE — RXMED WILLOW AMBULATORY MEDICATION CHARGE: Performed by: FAMILY MEDICINE

## 2021-02-16 PROCEDURE — RXMED WILLOW AMBULATORY MEDICATION CHARGE: Performed by: NURSE PRACTITIONER

## 2021-02-17 ENCOUNTER — PHARMACY VISIT (OUTPATIENT)
Dept: PHARMACY | Facility: MEDICAL CENTER | Age: 51
End: 2021-02-17
Payer: COMMERCIAL

## 2021-02-25 NOTE — NON-PROVIDER
Patient was seen today for a self pay tb skin test. This is the second placement for the patient. He did miss the read for the first placement. Patient was told when we had to come back. 1/10/19 AFTER 10:30 am or 1/11/19 BEFORE 10:30 am. Patient did sign the tb instructions. When offered a copy patient did deny the copy since he put it in his phone as a reminder.      DISPLAY PLAN FREE TEXT DISPLAY PLAN FREE TEXT DISPLAY PLAN FREE TEXT

## 2021-03-02 PROCEDURE — RXMED WILLOW AMBULATORY MEDICATION CHARGE: Performed by: NURSE PRACTITIONER

## 2021-03-08 ENCOUNTER — PHARMACY VISIT (OUTPATIENT)
Dept: PHARMACY | Facility: MEDICAL CENTER | Age: 51
End: 2021-03-08
Payer: COMMERCIAL

## 2021-03-15 PROCEDURE — RXMED WILLOW AMBULATORY MEDICATION CHARGE: Performed by: FAMILY MEDICINE

## 2021-03-15 PROCEDURE — RXMED WILLOW AMBULATORY MEDICATION CHARGE: Performed by: NURSE PRACTITIONER

## 2021-03-18 DIAGNOSIS — E11.8 DM (DIABETES MELLITUS) WITH COMPLICATIONS (HCC): ICD-10-CM

## 2021-03-18 DIAGNOSIS — I10 ESSENTIAL HYPERTENSION: ICD-10-CM

## 2021-03-18 PROCEDURE — RXMED WILLOW AMBULATORY MEDICATION CHARGE: Performed by: NURSE PRACTITIONER

## 2021-03-18 RX ORDER — EMPAGLIFLOZIN AND METFORMIN HYDROCHLORIDE 12.5; 1 MG/1; MG/1
1 TABLET ORAL
Qty: 60 TABLET | Refills: 5 | Status: SHIPPED | OUTPATIENT
Start: 2021-03-18 | End: 2021-09-08 | Stop reason: SDUPTHER

## 2021-03-18 RX ORDER — LOSARTAN POTASSIUM AND HYDROCHLOROTHIAZIDE 25; 100 MG/1; MG/1
TABLET ORAL
Qty: 30 TABLET | Refills: 5 | Status: SHIPPED | OUTPATIENT
Start: 2021-03-18 | End: 2021-09-08 | Stop reason: SDUPTHER

## 2021-03-22 PROCEDURE — RXMED WILLOW AMBULATORY MEDICATION CHARGE: Performed by: NURSE PRACTITIONER

## 2021-03-23 ENCOUNTER — PHARMACY VISIT (OUTPATIENT)
Dept: PHARMACY | Facility: MEDICAL CENTER | Age: 51
End: 2021-03-23
Payer: COMMERCIAL

## 2021-04-05 PROCEDURE — RXMED WILLOW AMBULATORY MEDICATION CHARGE: Performed by: NURSE PRACTITIONER

## 2021-04-06 ENCOUNTER — PHARMACY VISIT (OUTPATIENT)
Dept: PHARMACY | Facility: MEDICAL CENTER | Age: 51
End: 2021-04-06
Payer: COMMERCIAL

## 2021-04-13 PROCEDURE — RXMED WILLOW AMBULATORY MEDICATION CHARGE: Performed by: FAMILY MEDICINE

## 2021-04-13 PROCEDURE — RXMED WILLOW AMBULATORY MEDICATION CHARGE: Performed by: NURSE PRACTITIONER

## 2021-04-14 ENCOUNTER — PHARMACY VISIT (OUTPATIENT)
Dept: PHARMACY | Facility: MEDICAL CENTER | Age: 51
End: 2021-04-14
Payer: COMMERCIAL

## 2021-04-19 PROCEDURE — RXMED WILLOW AMBULATORY MEDICATION CHARGE: Performed by: NURSE PRACTITIONER

## 2021-04-21 ENCOUNTER — PHARMACY VISIT (OUTPATIENT)
Dept: PHARMACY | Facility: MEDICAL CENTER | Age: 51
End: 2021-04-21
Payer: COMMERCIAL

## 2021-04-21 DIAGNOSIS — I10 ESSENTIAL HYPERTENSION: ICD-10-CM

## 2021-04-21 PROCEDURE — RXMED WILLOW AMBULATORY MEDICATION CHARGE: Performed by: NURSE PRACTITIONER

## 2021-04-21 RX ORDER — AMLODIPINE BESYLATE 5 MG/1
7.5 TABLET ORAL DAILY
Qty: 45 TABLET | Refills: 5 | Status: SHIPPED | OUTPATIENT
Start: 2021-04-21 | End: 2021-10-11 | Stop reason: SDUPTHER

## 2021-04-22 ENCOUNTER — PHARMACY VISIT (OUTPATIENT)
Dept: PHARMACY | Facility: MEDICAL CENTER | Age: 51
End: 2021-04-22
Payer: COMMERCIAL

## 2021-04-26 ENCOUNTER — HOSPITAL ENCOUNTER (OUTPATIENT)
Dept: LAB | Facility: MEDICAL CENTER | Age: 51
End: 2021-04-26
Attending: FAMILY MEDICINE
Payer: MEDICAID

## 2021-04-26 DIAGNOSIS — E78.5 DYSLIPIDEMIA: ICD-10-CM

## 2021-04-26 DIAGNOSIS — E11.8 DM (DIABETES MELLITUS) WITH COMPLICATIONS (HCC): ICD-10-CM

## 2021-04-26 DIAGNOSIS — E55.9 VITAMIN D DEFICIENCY: ICD-10-CM

## 2021-04-26 LAB
ALBUMIN SERPL BCP-MCNC: 4.3 G/DL (ref 3.2–4.9)
ALBUMIN/GLOB SERPL: 1.5 G/DL
ALP SERPL-CCNC: 43 U/L (ref 30–99)
ALT SERPL-CCNC: 26 U/L (ref 2–50)
ANION GAP SERPL CALC-SCNC: 7 MMOL/L (ref 7–16)
AST SERPL-CCNC: 23 U/L (ref 12–45)
BILIRUB SERPL-MCNC: 1 MG/DL (ref 0.1–1.5)
BUN SERPL-MCNC: 14 MG/DL (ref 8–22)
CALCIUM SERPL-MCNC: 9.1 MG/DL (ref 8.5–10.5)
CHLORIDE SERPL-SCNC: 105 MMOL/L (ref 96–112)
CHOLEST SERPL-MCNC: 122 MG/DL (ref 100–199)
CO2 SERPL-SCNC: 28 MMOL/L (ref 20–33)
CREAT SERPL-MCNC: 0.78 MG/DL (ref 0.5–1.4)
CREAT UR-MCNC: 125.79 MG/DL
FASTING STATUS PATIENT QL REPORTED: NORMAL
GLOBULIN SER CALC-MCNC: 2.9 G/DL (ref 1.9–3.5)
GLUCOSE SERPL-MCNC: 107 MG/DL (ref 65–99)
HDLC SERPL-MCNC: 41 MG/DL
LDLC SERPL CALC-MCNC: 59 MG/DL
MICROALBUMIN UR-MCNC: 1.9 MG/DL
MICROALBUMIN/CREAT UR: 15 MG/G (ref 0–30)
POTASSIUM SERPL-SCNC: 3.9 MMOL/L (ref 3.6–5.5)
PROT SERPL-MCNC: 7.2 G/DL (ref 6–8.2)
SODIUM SERPL-SCNC: 140 MMOL/L (ref 135–145)
TRIGL SERPL-MCNC: 108 MG/DL (ref 0–149)

## 2021-04-26 PROCEDURE — 82570 ASSAY OF URINE CREATININE: CPT

## 2021-04-26 PROCEDURE — 82306 VITAMIN D 25 HYDROXY: CPT

## 2021-04-26 PROCEDURE — 80061 LIPID PANEL: CPT

## 2021-04-26 PROCEDURE — 82043 UR ALBUMIN QUANTITATIVE: CPT

## 2021-04-26 PROCEDURE — 36415 COLL VENOUS BLD VENIPUNCTURE: CPT

## 2021-04-26 PROCEDURE — 80053 COMPREHEN METABOLIC PANEL: CPT

## 2021-04-27 ENCOUNTER — NON-PROVIDER VISIT (OUTPATIENT)
Dept: MEDICAL GROUP | Facility: MEDICAL CENTER | Age: 51
End: 2021-04-27
Attending: FAMILY MEDICINE
Payer: MEDICAID

## 2021-04-27 VITALS
SYSTOLIC BLOOD PRESSURE: 122 MMHG | BODY MASS INDEX: 27.22 KG/M2 | DIASTOLIC BLOOD PRESSURE: 83 MMHG | HEART RATE: 64 BPM | WEIGHT: 212 LBS

## 2021-04-27 DIAGNOSIS — E11.9 CONTROLLED TYPE 2 DIABETES MELLITUS WITHOUT COMPLICATION, WITHOUT LONG-TERM CURRENT USE OF INSULIN (HCC): ICD-10-CM

## 2021-04-27 DIAGNOSIS — E11.8 DM (DIABETES MELLITUS) WITH COMPLICATIONS (HCC): ICD-10-CM

## 2021-04-27 LAB
25(OH)D3 SERPL-MCNC: 41 NG/ML (ref 30–80)
HBA1C MFR BLD: 5.6 % (ref 0–5.6)
INT CON NEG: NORMAL
INT CON POS: NORMAL

## 2021-04-27 PROCEDURE — 83036 HEMOGLOBIN GLYCOSYLATED A1C: CPT

## 2021-04-27 PROCEDURE — 99213 OFFICE O/P EST LOW 20 MIN: CPT | Performed by: NURSE PRACTITIONER

## 2021-04-27 ASSESSMENT — FIBROSIS 4 INDEX: FIB4 SCORE: 0.85

## 2021-04-27 NOTE — NON-PROVIDER
Patient Consult Note    TIME IN: 10:00 am  TIME OUT: 10:30 am    Primary care physician: RAFFAELE Ferreira    Reason for consult: Management of Uncontrolled Type 2 Diabetes    HPI:  Myron Lilly is a 50 y.o. old patient who comes in today for evaluation of above stated problem.    [facility] He states that since establishing with our clinic that he feels much better and healthier.    Pt presents to clinic today doing very well. He is very active and also practices portion moderation with his meals and snacks.     Pt's A1c was done in the office today and remains at 5.6% which is WNL. Counseled pt that this is a normal A1c. Congratulated pt on this and encouraged him to continue this trend.    Most Recent HbA1c:   Lab Results   Component Value Date/Time    HBA1C 5.6 04/27/2021 10:14 AM      Lab Results   Component Value Date/Time    CREATININE 0.78 04/26/2021 07:30 AM        Diabetes Medication History and Current Regimen  Metformin + SGLT-2 Inhibitor: Synjardy 12.5-1000 mg BID   GLP-1 Agent: Ozempic 1 mg once weekly    Pt has home glucometer and proper testing technique - Yes    Pt reports blood sugars: Pt only tests BG when he feels off - cannot recall last reading.    Hypoglycemia awareness - Yes  Nocturnal hypoglycemia- Denies  Hypoglycemia:  None    Pt's treatment of Hypoglycemia - Counseled  - 15:15 Rule    Current Exercise - Lifts weights, bikes, walks. Spends ~5 hrs doing physical activity/week  Exercise Goal - Continue current regimen.    Dietary - Pt practices portion size moderation. He drinks water. Will indulge w/ pizza once every 2 weeks or so. Eats eggs and sausage for breakfast. Will snack on popcorn or goldfish. Mashed potatoes and pork chop for dinner last night.    Foot Exam:  Monofilament exam - Pt deferred today    Preventative Management  BP regimen (ACE/ARB) - Losartan 100 mg once daily  ASA - N/a  Statin - Atorvastatin 20 mg once daily  Last Retinal Scan -  Pt has yet to schedule. He will schedule prior to f/u  Last Foot Exam - Pt checks his feet daily  Last A1c -   Lab Results   Component Value Date/Time    HBA1C 5.6 04/27/2021 10:14 AM      Last Microalbuminuria -    Ref. Range 4/26/2021 07:30   Micro Alb Creat Ratio Latest Ref Range: 0 - 30 mg/g 15   Creatinine, Urine Latest Units: mg/dL 125.79   Microalbumin, Urine Random Latest Units: mg/dL 1.9       Past Medical History:  Patient Active Problem List    Diagnosis Date Noted   • Chest pain 11/12/2018   • Encounter to establish care 01/13/2020   • Atrophy of quadriceps femoris muscle 10/30/2018   • Quadriceps muscle rupture, left, subsequent encounter 10/09/2018   • Chronic right shoulder pain 07/03/2018   • Controlled type 2 diabetes mellitus without complication, without long-term current use of insulin (HCC) 07/03/2018   • Essential hypertension 07/03/2018   • Pure hypercholesterolemia 07/03/2018   • Obesity (BMI 30-39.9) 07/03/2018       Past Surgical History:  Past Surgical History:   Procedure Laterality Date   • SHOULDER ARTHROSCOPY Right 11/5/2018    Procedure: SHOULDER ARTHROSCOPY;  Surgeon: Wesly Weinstein M.D.;  Location: [facility];  Service: Orthopedics   • SHOULDER DECOMPRESSION Right 11/5/2018    Procedure: SHOULDER DECOMPRESSION-  W/MINI OPEN SUBACROMIAL;  Surgeon: Wesly Weinstein M.D.;  Location: [facility];  Service: Orthopedics   • CLAVICLE DISTAL EXCISION Right 11/5/2018    Procedure: CLAVICLE DISTAL EXCISION, LATERAL CORACOID DECOMPRESSION;  Surgeon: Wesly Weinstein M.D.;  Location: [facility];  Service: Orthopedics   • SHOULDER ARTHROSCOPY W/ BICIPITAL TENODESIS REPAIR Right 11/5/2018    Procedure: SHOULDER ARTHROSCOPY W/ BICIPITAL TENODESIS REPAIR;  Surgeon: Wesly Weinstein M.D.;  Location: [facility];  Service: Orthopedics   • ELBOW ORIF Left 1980   • CHOLECYSTECTOMY      approx 2004       Allergies:  Patient has no known  allergies.    Social History:  Social History     Socioeconomic History   • Marital status:      Spouse name: Not on file   • Number of children: Not on file   • Years of education: Not on file   • Highest education level: Not on file   Occupational History   • Not on file   Tobacco Use   • Smoking status: Never Smoker   • Smokeless tobacco: Former User   Substance and Sexual Activity   • Alcohol use: Yes     Comment: 2 per month   • Drug use: No   • Sexual activity: Yes     Partners: Female   Other Topics Concern   •  Service Yes   • Blood Transfusions Not Asked   • Caffeine Concern Not Asked   • Occupational Exposure Not Asked   • Hobby Hazards Not Asked   • Sleep Concern Not Asked   • Stress Concern Not Asked   • Weight Concern Yes   • Special Diet Not Asked   • Back Care Not Asked   • Exercise No   • Bike Helmet Not Asked   • Seat Belt Not Asked   • Self-Exams Not Asked   Social History Narrative   • Not on file     Social Determinants of Health     Financial Resource Strain:    • Difficulty of Paying Living Expenses:    Food Insecurity:    • Worried About Running Out of Food in the Last Year:    • Ran Out of Food in the Last Year:    Transportation Needs:    • Lack of Transportation (Medical):    • Lack of Transportation (Non-Medical):    Physical Activity:    • Days of Exercise per Week:    • Minutes of Exercise per Session:    Stress:    • Feeling of Stress :    Social Connections:    • Frequency of Communication with Friends and Family:    • Frequency of Social Gatherings with Friends and Family:    • Attends Synagogue Services:    • Active Member of Clubs or Organizations:    • Attends Club or Organization Meetings:    • Marital Status:    Intimate Partner Violence:    • Fear of Current or Ex-Partner:    • Emotionally Abused:    • Physically Abused:    • Sexually Abused:        Family History:  Family History   Problem Relation Age of Onset   • Diabetes Mother    • Diabetes Father    •  Hypertension Father    • Diabetes Brother    • Cancer Child         ALL at 16 months       Medications:    Current Outpatient Medications:   •  Semaglutide, 1 MG/DOSE, (OZEMPIC, 1 MG/DOSE,) 2 MG/1.5ML Solution Pen-injector, INJECT 1MG SUBCUTANEOUSLY EVERY SEVEN (7) DAYS AS DIRECTED, Disp: 3 mL, Rfl: 5  •  amLODIPine (NORVASC) 5 MG Tab, Take 1.5 Tabs by mouth every day., Disp: 45 tablet, Rfl: 5  •  losartan-hydrochlorothiazide (HYZAAR) 100-25 MG per tablet, TAKE 1 TABLET ORALLY ONCE DAILY, Disp: 30 tablet, Rfl: 5  •  Empagliflozin-metFORMIN HCl (SYNJARDY) 12.5-1000 MG Tab, Take 1 Tab by mouth 2 Times a Day., Disp: 60 tablet, Rfl: 5  •  atorvastatin (LIPITOR) 20 MG Tab, Take 1 Tab by mouth every bedtime., Disp: 30 Tab, Rfl: 5  •  ergocalciferol (DRISDOL) 51024 UNIT capsule, Take 1 Capsule by mouth every 14 days., Disp: 2 Cap, Rfl: 5  •  Icosapent Ethyl (VASCEPA) 1 g Cap, Take 2 Caps by mouth 2 Times a Day., Disp: 120 Cap, Rfl: 5  •  Blood Glucose Monitoring Suppl w/Device Kit, Please dispense 100 test strips and lancets with 5 refills E11.8, Disp: 1 Kit, Rfl: 0  •  glucose blood strip, OneTouch Verio strips, Disp: , Rfl:   •  Blood Glucose Monitoring Suppl (ONETOUCH VERIO FLEX SYSTEM) w/Device Kit, OneTouch Verio System  USE PRN, Disp: , Rfl:   •  meloxicam (MOBIC) 7.5 MG Tab, Take 7.5 mg by mouth every day., Disp: , Rfl:     Labs: Reviewed    Physical Examination:  Vital signs: /83 (BP Location: Right arm, Patient Position: Sitting, BP Cuff Size: Adult)   Pulse 64   Wt 96.2 kg (212 lb)   BMI 27.22 kg/m²  Body mass index is 27.22 kg/m².    Assessment and Plan:    1. DM2  · Pt's A1c remains w/in goal range. Since establishing w/ our clinic he has made significant lifestyle modification.  · Pt is tolerating his medications w/ no c/o SE.  · Pt has not had to test his BG for some time - he cannot recall his last reading as it has been so long. Pt only tests when he feels s/sx of hyper/hypoglycemia.    -  Medication changes - Continue current therapy     - Lifestyle changes - Continue current diet and exercise    FU: 3 months for repeat A1c    Kirill Ramirez, PharmD  04/27/21    CC:   RAFFAELE Ferreira

## 2021-05-03 PROCEDURE — RXMED WILLOW AMBULATORY MEDICATION CHARGE: Performed by: NURSE PRACTITIONER

## 2021-05-06 ENCOUNTER — PHARMACY VISIT (OUTPATIENT)
Dept: PHARMACY | Facility: MEDICAL CENTER | Age: 51
End: 2021-05-06
Payer: COMMERCIAL

## 2021-05-10 PROCEDURE — RXMED WILLOW AMBULATORY MEDICATION CHARGE: Performed by: NURSE PRACTITIONER

## 2021-05-11 PROCEDURE — RXMED WILLOW AMBULATORY MEDICATION CHARGE: Performed by: FAMILY MEDICINE

## 2021-05-12 ENCOUNTER — PHARMACY VISIT (OUTPATIENT)
Dept: PHARMACY | Facility: MEDICAL CENTER | Age: 51
End: 2021-05-12
Payer: COMMERCIAL

## 2021-05-25 ENCOUNTER — PHARMACY VISIT (OUTPATIENT)
Dept: PHARMACY | Facility: MEDICAL CENTER | Age: 51
End: 2021-05-25
Payer: COMMERCIAL

## 2021-05-25 DIAGNOSIS — E55.9 VITAMIN D DEFICIENCY: ICD-10-CM

## 2021-05-25 PROCEDURE — RXMED WILLOW AMBULATORY MEDICATION CHARGE: Performed by: NURSE PRACTITIONER

## 2021-05-25 RX ORDER — ERGOCALCIFEROL 1.25 MG/1
50000 CAPSULE ORAL
Qty: 2 CAPSULE | Refills: 5 | Status: SHIPPED | OUTPATIENT
Start: 2021-05-25 | End: 2021-12-10 | Stop reason: SDUPTHER

## 2021-06-01 DIAGNOSIS — E78.1 HIGH TRIGLYCERIDES: ICD-10-CM

## 2021-06-01 PROCEDURE — RXMED WILLOW AMBULATORY MEDICATION CHARGE: Performed by: NURSE PRACTITIONER

## 2021-06-01 RX ORDER — ICOSAPENT ETHYL 1000 MG/1
2 CAPSULE ORAL 2 TIMES DAILY
Qty: 120 CAPSULE | Refills: 5 | Status: SHIPPED | OUTPATIENT
Start: 2021-06-01 | End: 2021-12-13 | Stop reason: SDUPTHER

## 2021-06-01 RX ORDER — ICOSAPENT ETHYL 1000 MG/1
2 CAPSULE ORAL 2 TIMES DAILY
Qty: 120 CAPSULE | Refills: 5 | Status: CANCELLED | OUTPATIENT
Start: 2021-06-01

## 2021-06-02 ENCOUNTER — PHARMACY VISIT (OUTPATIENT)
Dept: PHARMACY | Facility: MEDICAL CENTER | Age: 51
End: 2021-06-02
Payer: COMMERCIAL

## 2021-06-05 ENCOUNTER — PHARMACY VISIT (OUTPATIENT)
Dept: PHARMACY | Facility: MEDICAL CENTER | Age: 51
End: 2021-06-05
Payer: COMMERCIAL

## 2021-06-05 PROCEDURE — RXMED WILLOW AMBULATORY MEDICATION CHARGE: Performed by: NURSE PRACTITIONER

## 2021-06-28 DIAGNOSIS — E78.5 DYSLIPIDEMIA: ICD-10-CM

## 2021-06-28 RX ORDER — ATORVASTATIN CALCIUM 20 MG/1
20 TABLET, FILM COATED ORAL
Qty: 90 TABLET | Refills: 3 | Status: SHIPPED | OUTPATIENT
Start: 2021-06-28 | End: 2022-11-03 | Stop reason: SDUPTHER

## 2021-07-17 ENCOUNTER — PHARMACY VISIT (OUTPATIENT)
Dept: PHARMACY | Facility: MEDICAL CENTER | Age: 51
End: 2021-07-17
Payer: COMMERCIAL

## 2021-07-17 PROCEDURE — RXMED WILLOW AMBULATORY MEDICATION CHARGE: Performed by: NURSE PRACTITIONER

## 2021-08-04 ENCOUNTER — NON-PROVIDER VISIT (OUTPATIENT)
Dept: MEDICAL GROUP | Facility: MEDICAL CENTER | Age: 51
End: 2021-08-04
Attending: NURSE PRACTITIONER
Payer: MEDICAID

## 2021-08-04 VITALS
DIASTOLIC BLOOD PRESSURE: 74 MMHG | BODY MASS INDEX: 26.58 KG/M2 | SYSTOLIC BLOOD PRESSURE: 123 MMHG | WEIGHT: 207 LBS | HEART RATE: 69 BPM

## 2021-08-04 DIAGNOSIS — E11.8 DM (DIABETES MELLITUS) WITH COMPLICATIONS (HCC): ICD-10-CM

## 2021-08-04 LAB
HBA1C MFR BLD: 5.4 % (ref 0–5.6)
INT CON NEG: NORMAL
INT CON POS: NORMAL

## 2021-08-04 PROCEDURE — 83036 HEMOGLOBIN GLYCOSYLATED A1C: CPT

## 2021-08-04 ASSESSMENT — FIBROSIS 4 INDEX: FIB4 SCORE: 0.85

## 2021-08-04 NOTE — PROGRESS NOTES
New Patient Consult Note  Primary care physician: RAFFAELE Ferreira    Reason for consult: Management of Controlled Type 2 Diabetes    HPI:  Myron Lilly is a 50 y.o. old patient who comes in today for evaluation of above stated problem.    Pt continues to follow diet and exercise goals   A1c has continued to drop from 5.6 to 5.4 and weight has dropped from 212 to 207 lbs, encouraged pt to continue current lifestyle modifications.    Discussed monofilament exam, will complete at next appt  All labs current     Pt adherent w/ current medication regimen and happy with progress  Pt is optimized on Synjardy and Ozempic    Most Recent HbA1c:   Lab Results   Component Value Date/Time    HBA1C 5.4 08/04/2021 01:50 PM    HBA1C 5.6 04/27/2021 10:14 AM    HBA1C 5.6 12/15/2020 10:30 AM         Current Diabetes Regimen:  GLP-1 Agent: ozempic 1 mg weekly Wed  Metformin + SGLT-2 Inhibitor: Synjardy 12.5/1000 BID      Pt does not check BG at home    Hypoglycemia:  None      ROS:  Constitutional: No weight loss  Cardiac: No palpitations or racing heart  Resp: No shortness of breath  Neuro: No numbness or tinging in feet  Endo: No heat or cold intolerance, no polyuria or polydipsia  All other systems were reviewed and were negative.    Past Medical History:  Patient Active Problem List    Diagnosis Date Noted   • Encounter to establish care 01/13/2020   • Chest pain 11/12/2018   • Atrophy of quadriceps femoris muscle 10/30/2018   • Quadriceps muscle rupture, left, subsequent encounter 10/09/2018   • Chronic right shoulder pain 07/03/2018   • Controlled type 2 diabetes mellitus without complication, without long-term current use of insulin (HCC) 07/03/2018   • Essential hypertension 07/03/2018   • Pure hypercholesterolemia 07/03/2018   • Obesity (BMI 30-39.9) 07/03/2018       Past Surgical History:  Past Surgical History:   Procedure Laterality Date   • SHOULDER ARTHROSCOPY Right 11/5/2018    Procedure: SHOULDER  ARTHROSCOPY;  Surgeon: Wesly Weinstein M.D.;  Location: SURGERY [facility];  Service: Orthopedics   • SHOULDER DECOMPRESSION Right 11/5/2018    Procedure: SHOULDER DECOMPRESSION-  W/MINI OPEN SUBACROMIAL;  Surgeon: Wesly Weinstein M.D.;  Location: SURGERY [facility];  Service: Orthopedics   • CLAVICLE DISTAL EXCISION Right 11/5/2018    Procedure: CLAVICLE DISTAL EXCISION, LATERAL CORACOID DECOMPRESSION;  Surgeon: Wesly Weinstein M.D.;  Location: SURGERY [facility];  Service: Orthopedics   • SHOULDER ARTHROSCOPY W/ BICIPITAL TENODESIS REPAIR Right 11/5/2018    Procedure: SHOULDER ARTHROSCOPY W/ BICIPITAL TENODESIS REPAIR;  Surgeon: Wesly Weinstein M.D.;  Location: SURGERY [facility];  Service: Orthopedics   • ELBOW ORIF Left 1980   • CHOLECYSTECTOMY      approx 2004       Allergies:  Patient has no known allergies.    Social History:  Social History     Socioeconomic History   • Marital status:      Spouse name: Not on file   • Number of children: Not on file   • Years of education: Not on file   • Highest education level: Not on file   Occupational History   • Not on file   Tobacco Use   • Smoking status: Never Smoker   • Smokeless tobacco: Former User   Vaping Use   • Vaping Use: Never used   Substance and Sexual Activity   • Alcohol use: Yes     Comment: 2 per month   • Drug use: No   • Sexual activity: Yes     Partners: Female   Other Topics Concern   •  Service Yes   • Blood Transfusions Not Asked   • Caffeine Concern Not Asked   • Occupational Exposure Not Asked   • Hobby Hazards Not Asked   • Sleep Concern Not Asked   • Stress Concern Not Asked   • Weight Concern Yes   • Special Diet Not Asked   • Back Care Not Asked   • Exercise No   • Bike Helmet Not Asked   • Seat Belt Not Asked   • Self-Exams Not Asked   Social History Narrative   • Not on file     Social Determinants of Health     Financial Resource Strain:    • Difficulty of Paying Living Expenses:    Food  Insecurity:    • Worried About Running Out of Food in the Last Year:    • Ran Out of Food in the Last Year:    Transportation Needs:    • Lack of Transportation (Medical):    • Lack of Transportation (Non-Medical):    Physical Activity:    • Days of Exercise per Week:    • Minutes of Exercise per Session:    Stress:    • Feeling of Stress :    Social Connections:    • Frequency of Communication with Friends and Family:    • Frequency of Social Gatherings with Friends and Family:    • Attends Mosque Services:    • Active Member of Clubs or Organizations:    • Attends Club or Organization Meetings:    • Marital Status:    Intimate Partner Violence:    • Fear of Current or Ex-Partner:    • Emotionally Abused:    • Physically Abused:    • Sexually Abused:        Family History:  Family History   Problem Relation Age of Onset   • Diabetes Mother    • Diabetes Father    • Hypertension Father    • Diabetes Brother    • Cancer Child         ALL at 16 months       Medications:    Current Outpatient Medications:   •  atorvastatin (LIPITOR) 20 MG Tab, Take 1 Tab by mouth every bedtime., Disp: 90 tablet, Rfl: 3  •  Icosapent Ethyl (VASCEPA) 1 g Cap, Take 2 Caps by mouth 2 Times a Day., Disp: 120 capsule, Rfl: 5  •  ergocalciferol (DRISDOL) 82844 UNIT capsule, Take 1 Capsule by mouth every 14 days., Disp: 2 capsule, Rfl: 5  •  Semaglutide, 1 MG/DOSE, (OZEMPIC, 1 MG/DOSE,) 4 MG/3ML Solution Pen-injector, INJECT 1MG SUBCUTANEOUSLY EVERY SEVEN (7) DAYS AS DIRECTED, Disp: 3 mL, Rfl: 5  •  amLODIPine (NORVASC) 5 MG Tab, Take 1.5 Tabs by mouth every day., Disp: 45 tablet, Rfl: 5  •  losartan-hydrochlorothiazide (HYZAAR) 100-25 MG per tablet, TAKE 1 TABLET ORALLY ONCE DAILY, Disp: 30 tablet, Rfl: 5  •  Empagliflozin-metFORMIN HCl (SYNJARDY) 12.5-1000 MG Tab, Take 1 Tab by mouth 2 Times a Day., Disp: 60 tablet, Rfl: 5  •  Blood Glucose Monitoring Suppl w/Device Kit, Please dispense 100 test strips and lancets with 5 refills E11.8,  Disp: 1 Kit, Rfl: 0  •  glucose blood strip, OneTouch Verio strips, Disp: , Rfl:   •  Blood Glucose Monitoring Suppl (ONETOUCH VERIO FLEX SYSTEM) w/Device Kit, OneTouch Verio System  USE PRN, Disp: , Rfl:   •  meloxicam (MOBIC) 7.5 MG Tab, Take 7.5 mg by mouth every day., Disp: , Rfl:     Labs: Reviewed    Physical Examination:  Vital signs: /74   Pulse 69   Wt 93.9 kg (207 lb)   BMI 26.58 kg/m²  Body mass index is 26.58 kg/m².  General: No apparent distress, cooperative  Eyes: No scleral icterus or discharge  ENMT: Normal on external inspection of nose, lips, normal thyroid exam  Neck: No abnormal masses on inspection  Resp: Normal effort, clear to auscultation bilaterally   CVS: Regular rate and rhythm, S1 S2 normal, no murmur   Extremities: No edema  Abdomen: abdominal obesity present  Neuro: Alert and oriented  Skin: No rash  Psych: Normal mood and affect, intact memory and able to make informed decisions    Assessment and Plan:    1. DM (diabetes mellitus) with complications (HCC)    Pt has met diabetes goal, no changes necessary at this time  Continue current diet, exercise, and medication regimen        No follow-ups on file.    Thank you for allowing me to participate in the care of this patient.    Judi Henderson, PharmD BC-ADM Board Certified Advance Diabetes Specialist  08/04/21    CC:   RAFFAELE Ferreira

## 2021-08-11 PROCEDURE — RXMED WILLOW AMBULATORY MEDICATION CHARGE: Performed by: NURSE PRACTITIONER

## 2021-08-13 PROCEDURE — RXMED WILLOW AMBULATORY MEDICATION CHARGE: Performed by: NURSE PRACTITIONER

## 2021-08-17 ENCOUNTER — PHARMACY VISIT (OUTPATIENT)
Dept: PHARMACY | Facility: MEDICAL CENTER | Age: 51
End: 2021-08-17
Payer: COMMERCIAL

## 2021-09-08 ENCOUNTER — PHARMACY VISIT (OUTPATIENT)
Dept: PHARMACY | Facility: MEDICAL CENTER | Age: 51
End: 2021-09-08
Payer: COMMERCIAL

## 2021-09-08 DIAGNOSIS — E11.8 DM (DIABETES MELLITUS) WITH COMPLICATIONS (HCC): ICD-10-CM

## 2021-09-08 DIAGNOSIS — I10 ESSENTIAL HYPERTENSION: ICD-10-CM

## 2021-09-08 RX ORDER — EMPAGLIFLOZIN AND METFORMIN HYDROCHLORIDE 12.5; 1 MG/1; MG/1
1 TABLET ORAL
Qty: 60 TABLET | Refills: 5 | Status: SHIPPED | OUTPATIENT
Start: 2021-09-08 | End: 2022-03-15 | Stop reason: SDUPTHER

## 2021-09-08 RX ORDER — LOSARTAN POTASSIUM AND HYDROCHLOROTHIAZIDE 25; 100 MG/1; MG/1
1 TABLET ORAL DAILY
Qty: 30 TABLET | Refills: 5 | Status: SHIPPED | OUTPATIENT
Start: 2021-09-08 | End: 2022-03-15 | Stop reason: SDUPTHER

## 2021-09-09 ENCOUNTER — PHARMACY VISIT (OUTPATIENT)
Dept: PHARMACY | Facility: MEDICAL CENTER | Age: 51
End: 2021-09-09
Payer: COMMERCIAL

## 2021-09-09 PROCEDURE — RXMED WILLOW AMBULATORY MEDICATION CHARGE: Performed by: NURSE PRACTITIONER

## 2021-09-17 ENCOUNTER — PHARMACY VISIT (OUTPATIENT)
Dept: PHARMACY | Facility: MEDICAL CENTER | Age: 51
End: 2021-09-17
Payer: COMMERCIAL

## 2021-10-05 PROCEDURE — RXMED WILLOW AMBULATORY MEDICATION CHARGE: Performed by: NURSE PRACTITIONER

## 2021-10-11 DIAGNOSIS — I10 ESSENTIAL HYPERTENSION: ICD-10-CM

## 2021-10-11 PROCEDURE — RXMED WILLOW AMBULATORY MEDICATION CHARGE: Performed by: NURSE PRACTITIONER

## 2021-10-11 RX ORDER — AMLODIPINE BESYLATE 5 MG/1
7.5 TABLET ORAL DAILY
Qty: 45 TABLET | Refills: 5 | Status: SHIPPED | OUTPATIENT
Start: 2021-10-11 | End: 2022-04-13 | Stop reason: SDUPTHER

## 2021-10-11 NOTE — TELEPHONE ENCOUNTER
Received request via: Telephone    Was the patient seen in the last year in this department? No     Does the patient have an active prescription (recently filled or refills available) for medication(s) requested? No

## 2021-10-19 ENCOUNTER — PHARMACY VISIT (OUTPATIENT)
Dept: PHARMACY | Facility: MEDICAL CENTER | Age: 51
End: 2021-10-19
Payer: COMMERCIAL

## 2021-11-09 DIAGNOSIS — E11.9 CONTROLLED TYPE 2 DIABETES MELLITUS WITHOUT COMPLICATION, WITHOUT LONG-TERM CURRENT USE OF INSULIN (HCC): ICD-10-CM

## 2021-11-09 PROCEDURE — RXMED WILLOW AMBULATORY MEDICATION CHARGE: Performed by: NURSE PRACTITIONER

## 2021-11-10 PROCEDURE — RXMED WILLOW AMBULATORY MEDICATION CHARGE: Performed by: NURSE PRACTITIONER

## 2021-11-10 RX ORDER — SEMAGLUTIDE 1.34 MG/ML
INJECTION, SOLUTION SUBCUTANEOUS
Qty: 3 ML | Refills: 5 | Status: SHIPPED | OUTPATIENT
Start: 2021-11-10 | End: 2022-05-26 | Stop reason: SDUPTHER

## 2021-11-11 PROCEDURE — RXMED WILLOW AMBULATORY MEDICATION CHARGE: Performed by: NURSE PRACTITIONER

## 2021-11-19 ENCOUNTER — PHARMACY VISIT (OUTPATIENT)
Dept: PHARMACY | Facility: MEDICAL CENTER | Age: 51
End: 2021-11-19
Payer: COMMERCIAL

## 2021-12-07 ENCOUNTER — NON-PROVIDER VISIT (OUTPATIENT)
Dept: MEDICAL GROUP | Facility: MEDICAL CENTER | Age: 51
End: 2021-12-07
Attending: NURSE PRACTITIONER
Payer: MEDICAID

## 2021-12-07 VITALS
SYSTOLIC BLOOD PRESSURE: 113 MMHG | HEIGHT: 74 IN | WEIGHT: 212.2 LBS | DIASTOLIC BLOOD PRESSURE: 77 MMHG | BODY MASS INDEX: 27.23 KG/M2

## 2021-12-07 DIAGNOSIS — E11.9 CONTROLLED TYPE 2 DIABETES MELLITUS WITHOUT COMPLICATION, WITHOUT LONG-TERM CURRENT USE OF INSULIN (HCC): ICD-10-CM

## 2021-12-07 LAB
HBA1C MFR BLD: 5.6 % (ref 0–5.6)
INT CON NEG: NORMAL
INT CON POS: NORMAL

## 2021-12-07 PROCEDURE — 99213 OFFICE O/P EST LOW 20 MIN: CPT | Performed by: PHARMACIST

## 2021-12-07 PROCEDURE — 83036 HEMOGLOBIN GLYCOSYLATED A1C: CPT

## 2021-12-07 ASSESSMENT — FIBROSIS 4 INDEX: FIB4 SCORE: 0.87

## 2021-12-07 NOTE — PROGRESS NOTES
New Patient Consult Note  Primary care physician: RAFFAELE Ferreira  In: 10:15a  Out: 11a  Reason for consult: Management of Uncontrolled Type 2 Diabetes    HPI:  Myron Lilly is a 51 y.o. old patient who comes in today for evaluation of above stated problem.    Pt is still doing well and says he's maintained his consistent diet and exercise. He may sometimes cheat with pizza or pasta but says it eats it with moderation and get's plenty of exercise to offset this. I recommended alternatives such as low carb pasta or cauliflower pizza crust or keto friendly crust.     Pt does not test BG at all but and has had the same strips for a while. I told him they do  after a while and that we should still test a few times a month. He will  refills for test strips if they are expiring.     Most Recent HbA1c:   Lab Results   Component Value Date/Time    HBA1C 5.6 2021 10:30 AM    HBA1C 5.4 2021 01:50 PM    HBA1C 5.6 2021 10:14 AM     Pt's A1c remains stable.     Current Diabetes Regimen:  GLP-1 Agent: Ozempic 1mg weekly  Metformin + SGLT-2 Inhibitor: Synjardy 12.5/1000mg BID    Self reported: doesn't test  Before Breakfast:  Before Lunch:  Before Dinner:  Before Bedtime:  Other times:  Hypoglycemia: none    Monofilament testing with a 10 gram force: sensation intact: intact bilaterally  Visual Inspection: Feet without maceration, ulcers, fissures.  Pedal pulses: intact bilaterally  Importance of checking feet daily and foot hygiene discussed.       ROS:  Constitutional: No weight loss  Cardiac: No palpitations or racing heart  Resp: No shortness of breath  Neuro: No numbness or tinging in feet  Endo: No heat or cold intolerance, no polyuria or polydipsia  All other systems were reviewed and were negative.    Past Medical History:  Patient Active Problem List    Diagnosis Date Noted   • Encounter to establish care 2020   • Chest pain 2018   • Atrophy of quadriceps femoris  muscle 10/30/2018   • Quadriceps muscle rupture, left, subsequent encounter 10/09/2018   • Chronic right shoulder pain 07/03/2018   • Controlled type 2 diabetes mellitus without complication, without long-term current use of insulin (HCC) 07/03/2018   • Essential hypertension 07/03/2018   • Pure hypercholesterolemia 07/03/2018   • Obesity (BMI 30-39.9) 07/03/2018       Past Surgical History:  Past Surgical History:   Procedure Laterality Date   • SHOULDER ARTHROSCOPY Right 11/5/2018    Procedure: SHOULDER ARTHROSCOPY;  Surgeon: Wesly Weinstein M.D.;  Location: [facility];  Service: Orthopedics   • SHOULDER DECOMPRESSION Right 11/5/2018    Procedure: SHOULDER DECOMPRESSION-  W/MINI OPEN SUBACROMIAL;  Surgeon: Wesly Weinstein M.D.;  Location: [facility];  Service: Orthopedics   • CLAVICLE DISTAL EXCISION Right 11/5/2018    Procedure: CLAVICLE DISTAL EXCISION, LATERAL CORACOID DECOMPRESSION;  Surgeon: Wesly Weinstein M.D.;  Location: [facility];  Service: Orthopedics   • SHOULDER ARTHROSCOPY W/ BICIPITAL TENODESIS REPAIR Right 11/5/2018    Procedure: SHOULDER ARTHROSCOPY W/ BICIPITAL TENODESIS REPAIR;  Surgeon: Wesly Weinstein M.D.;  Location: [facility];  Service: Orthopedics   • ELBOW ORIF Left 1980   • CHOLECYSTECTOMY      approx 2004       Allergies:  Patient has no known allergies.    Social History:  Social History     Socioeconomic History   • Marital status:      Spouse name: Not on file   • Number of children: Not on file   • Years of education: Not on file   • Highest education level: Not on file   Occupational History   • Not on file   Tobacco Use   • Smoking status: Never Smoker   • Smokeless tobacco: Former User   Vaping Use   • Vaping Use: Never used   Substance and Sexual Activity   • Alcohol use: Yes     Comment: 2 per month   • Drug use: No   • Sexual activity: Yes     Partners: Female   Other Topics Concern   •  Service  Yes   • Blood Transfusions Not Asked   • Caffeine Concern Not Asked   • Occupational Exposure Not Asked   • Hobby Hazards Not Asked   • Sleep Concern Not Asked   • Stress Concern Not Asked   • Weight Concern Yes   • Special Diet Not Asked   • Back Care Not Asked   • Exercise No   • Bike Helmet Not Asked   • Seat Belt Not Asked   • Self-Exams Not Asked   Social History Narrative   • Not on file     Social Determinants of Health     Financial Resource Strain:    • Difficulty of Paying Living Expenses: Not on file   Food Insecurity:    • Worried About Running Out of Food in the Last Year: Not on file   • Ran Out of Food in the Last Year: Not on file   Transportation Needs:    • Lack of Transportation (Medical): Not on file   • Lack of Transportation (Non-Medical): Not on file   Physical Activity:    • Days of Exercise per Week: Not on file   • Minutes of Exercise per Session: Not on file   Stress:    • Feeling of Stress : Not on file   Social Connections:    • Frequency of Communication with Friends and Family: Not on file   • Frequency of Social Gatherings with Friends and Family: Not on file   • Attends Yarsani Services: Not on file   • Active Member of Clubs or Organizations: Not on file   • Attends Club or Organization Meetings: Not on file   • Marital Status: Not on file   Intimate Partner Violence:    • Fear of Current or Ex-Partner: Not on file   • Emotionally Abused: Not on file   • Physically Abused: Not on file   • Sexually Abused: Not on file   Housing Stability:    • Unable to Pay for Housing in the Last Year: Not on file   • Number of Places Lived in the Last Year: Not on file   • Unstable Housing in the Last Year: Not on file       Family History:  Family History   Problem Relation Age of Onset   • Diabetes Mother    • Diabetes Father    • Hypertension Father    • Diabetes Brother    • Cancer Child         ALL at 16 months       Medications:    Current Outpatient Medications:   •  Semaglutide, 1  "MG/DOSE, (OZEMPIC, 1 MG/DOSE,) 4 MG/3ML Solution Pen-injector, INJECT 1MG SUBCUTANEOUSLY EVERY SEVEN (7) DAYS AS DIRECTED, Disp: 3 mL, Rfl: 5  •  amLODIPine (NORVASC) 5 MG Tab, Take 1.5 Tabs by mouth every day., Disp: 45 Tablet, Rfl: 5  •  losartan-hydrochlorothiazide (HYZAAR) 100-25 MG per tablet, Take 1 Tablet by mouth every day., Disp: 30 Tablet, Rfl: 5  •  Empagliflozin-metFORMIN HCl (SYNJARDY) 12.5-1000 MG Tab, Take 1 Tablet by mouth 2 Times a Day., Disp: 60 Tablet, Rfl: 5  •  atorvastatin (LIPITOR) 20 MG Tab, Take 1 Tab by mouth every bedtime., Disp: 90 tablet, Rfl: 3  •  Icosapent Ethyl (VASCEPA) 1 g Cap, Take 2 Caps by mouth 2 Times a Day., Disp: 120 capsule, Rfl: 5  •  ergocalciferol (DRISDOL) 03613 UNIT capsule, Take 1 Capsule by mouth every 14 days., Disp: 2 capsule, Rfl: 5  •  Blood Glucose Monitoring Suppl w/Device Kit, Please dispense 100 test strips and lancets with 5 refills E11.8, Disp: 1 Kit, Rfl: 0  •  glucose blood strip, OneTouch Verio strips, Disp: , Rfl:   •  Blood Glucose Monitoring Suppl (ONETOUCH VERIO FLEX SYSTEM) w/Device Kit, OneTouch Verio System  USE PRN, Disp: , Rfl:   •  meloxicam (MOBIC) 7.5 MG Tab, Take 7.5 mg by mouth every day., Disp: , Rfl:     Labs: Reviewed    Physical Examination:  Vital signs: Ht 1.88 m (6' 2\")   Wt 96.3 kg (212 lb 3.2 oz)   BMI 27.24 kg/m²  Body mass index is 27.24 kg/m².  General: No apparent distress, cooperative  Eyes: No scleral icterus or discharge  ENMT: Normal on external inspection of nose, lips, normal thyroid exam  Neck: No abnormal masses on inspection  Resp: Normal effort, clear to auscultation bilaterally   CVS: Regular rate and rhythm, S1 S2 normal, no murmur   Extremities: No edema  Abdomen: abdominal obesity present  Neuro: Alert and oriented  Skin: No rash  Psych: Normal mood and affect, intact memory and able to make informed decisions    Plan:    There are no diagnoses linked to this encounter.    Return in about 17 weeks (around " 4/5/2022).    Pt is stable. Continue current regimen and lifestyle.   - test BG a few times a week if possible, replace test strips.     Get labs done next year before next visit.     Thank you for allowing me to participate in the care of this patient.    Bartolo Stevens, PharmD    12/07/21    CC:   RAFFAELE Ferreira

## 2021-12-10 DIAGNOSIS — E55.9 VITAMIN D DEFICIENCY: ICD-10-CM

## 2021-12-10 PROCEDURE — RXMED WILLOW AMBULATORY MEDICATION CHARGE: Performed by: NURSE PRACTITIONER

## 2021-12-13 DIAGNOSIS — E78.1 HIGH TRIGLYCERIDES: ICD-10-CM

## 2021-12-13 PROCEDURE — RXMED WILLOW AMBULATORY MEDICATION CHARGE: Performed by: NURSE PRACTITIONER

## 2021-12-16 ENCOUNTER — PHARMACY VISIT (OUTPATIENT)
Dept: PHARMACY | Facility: MEDICAL CENTER | Age: 51
End: 2021-12-16
Payer: COMMERCIAL

## 2021-12-16 RX ORDER — ICOSAPENT ETHYL 1000 MG/1
2 CAPSULE ORAL 2 TIMES DAILY
Qty: 120 CAPSULE | Refills: 5 | Status: SHIPPED | OUTPATIENT
Start: 2021-12-16 | End: 2022-11-03 | Stop reason: SDUPTHER

## 2021-12-16 RX ORDER — ERGOCALCIFEROL 1.25 MG/1
50000 CAPSULE ORAL
Qty: 2 CAPSULE | Refills: 0 | Status: SHIPPED | OUTPATIENT
Start: 2021-12-16 | End: 2022-03-15 | Stop reason: SDUPTHER

## 2022-01-07 PROCEDURE — RXMED WILLOW AMBULATORY MEDICATION CHARGE: Performed by: NURSE PRACTITIONER

## 2022-01-11 PROCEDURE — RXMED WILLOW AMBULATORY MEDICATION CHARGE: Performed by: NURSE PRACTITIONER

## 2022-01-21 ENCOUNTER — PHARMACY VISIT (OUTPATIENT)
Dept: PHARMACY | Facility: MEDICAL CENTER | Age: 52
End: 2022-01-21
Payer: COMMERCIAL

## 2022-01-21 ENCOUNTER — TELEPHONE (OUTPATIENT)
Dept: MEDICAL GROUP | Facility: MEDICAL CENTER | Age: 52
End: 2022-01-21

## 2022-01-21 NOTE — TELEPHONE ENCOUNTER
DOCUMENTATION OF PAR STATUS:    1. Name of Medication & Dose: Vascepa     2. Name of Prescription Coverage Company & phone #: [email or web address]    3. Date Prior Auth Submitted: 1/21/22    4. What information was given to obtain insurance decision? Med history, progress notes, etc    5. Prior Auth Status? Pending    6. Patient Notified: no

## 2022-01-24 ENCOUNTER — TELEPHONE (OUTPATIENT)
Dept: MEDICAL GROUP | Facility: MEDICAL CENTER | Age: 52
End: 2022-01-24

## 2022-01-25 NOTE — TELEPHONE ENCOUNTER
FINAL PRIOR AUTHORIZATION STATUS:    1.  Name of Medication & Dose: Icosapent Ethyl 1 Gram     2. Prior Auth Status: Approved through 01/21/2022-01/21/2023      3. Action Taken: Pharmacy Notified: N\A Patient Notified: N\A

## 2022-02-11 DIAGNOSIS — E55.9 VITAMIN D DEFICIENCY: ICD-10-CM

## 2022-02-11 PROCEDURE — RXMED WILLOW AMBULATORY MEDICATION CHARGE: Performed by: NURSE PRACTITIONER

## 2022-02-11 RX ORDER — ERGOCALCIFEROL 1.25 MG/1
50000 CAPSULE ORAL
Qty: 2 CAPSULE | Refills: 0 | OUTPATIENT
Start: 2022-02-11

## 2022-02-16 PROCEDURE — RXMED WILLOW AMBULATORY MEDICATION CHARGE: Performed by: NURSE PRACTITIONER

## 2022-02-22 ENCOUNTER — PHARMACY VISIT (OUTPATIENT)
Dept: PHARMACY | Facility: MEDICAL CENTER | Age: 52
End: 2022-02-22
Payer: COMMERCIAL

## 2022-03-02 PROCEDURE — RXMED WILLOW AMBULATORY MEDICATION CHARGE: Performed by: NURSE PRACTITIONER

## 2022-03-03 ENCOUNTER — PHARMACY VISIT (OUTPATIENT)
Dept: PHARMACY | Facility: MEDICAL CENTER | Age: 52
End: 2022-03-03
Payer: COMMERCIAL

## 2022-03-15 DIAGNOSIS — E11.8 DM (DIABETES MELLITUS) WITH COMPLICATIONS (HCC): ICD-10-CM

## 2022-03-15 DIAGNOSIS — E55.9 VITAMIN D DEFICIENCY: ICD-10-CM

## 2022-03-15 DIAGNOSIS — I10 ESSENTIAL HYPERTENSION: ICD-10-CM

## 2022-03-15 PROCEDURE — RXMED WILLOW AMBULATORY MEDICATION CHARGE: Performed by: NURSE PRACTITIONER

## 2022-03-15 NOTE — TELEPHONE ENCOUNTER
Received request via: Pharmacy    Was the patient seen in the last year in this department? No    Does the patient have an active prescription (recently filled or refills available) for medication(s) requested? No     Last visit 1/13/20

## 2022-03-16 PROCEDURE — RXMED WILLOW AMBULATORY MEDICATION CHARGE: Performed by: NURSE PRACTITIONER

## 2022-03-16 RX ORDER — ERGOCALCIFEROL 1.25 MG/1
50000 CAPSULE ORAL
Qty: 2 CAPSULE | Refills: 0 | Status: SHIPPED | OUTPATIENT
Start: 2022-03-16 | End: 2022-05-26 | Stop reason: SDUPTHER

## 2022-03-16 RX ORDER — LOSARTAN POTASSIUM AND HYDROCHLOROTHIAZIDE 25; 100 MG/1; MG/1
1 TABLET ORAL DAILY
Qty: 30 TABLET | Refills: 5 | Status: SHIPPED | OUTPATIENT
Start: 2022-03-16 | End: 2022-11-03 | Stop reason: SDUPTHER

## 2022-03-16 RX ORDER — EMPAGLIFLOZIN AND METFORMIN HYDROCHLORIDE 12.5; 1 MG/1; MG/1
1 TABLET ORAL
Qty: 60 TABLET | Refills: 5 | Status: SHIPPED | OUTPATIENT
Start: 2022-03-16 | End: 2022-11-03 | Stop reason: SDUPTHER

## 2022-03-18 ENCOUNTER — PHARMACY VISIT (OUTPATIENT)
Dept: PHARMACY | Facility: MEDICAL CENTER | Age: 52
End: 2022-03-18
Payer: COMMERCIAL

## 2022-04-08 DIAGNOSIS — E55.9 VITAMIN D DEFICIENCY: ICD-10-CM

## 2022-04-08 RX ORDER — ERGOCALCIFEROL 1.25 MG/1
50000 CAPSULE ORAL
Qty: 2 CAPSULE | Refills: 0 | OUTPATIENT
Start: 2022-04-08

## 2022-04-13 DIAGNOSIS — I10 ESSENTIAL HYPERTENSION: ICD-10-CM

## 2022-04-13 PROCEDURE — RXMED WILLOW AMBULATORY MEDICATION CHARGE: Performed by: NURSE PRACTITIONER

## 2022-04-13 RX ORDER — AMLODIPINE BESYLATE 5 MG/1
7.5 TABLET ORAL DAILY
Qty: 45 TABLET | Refills: 5 | Status: SHIPPED | OUTPATIENT
Start: 2022-04-13 | End: 2022-11-03 | Stop reason: SDUPTHER

## 2022-04-14 ENCOUNTER — NON-PROVIDER VISIT (OUTPATIENT)
Dept: MEDICAL GROUP | Facility: MEDICAL CENTER | Age: 52
End: 2022-04-14
Attending: NURSE PRACTITIONER
Payer: MEDICAID

## 2022-04-14 VITALS
DIASTOLIC BLOOD PRESSURE: 81 MMHG | HEIGHT: 74 IN | WEIGHT: 214.5 LBS | BODY MASS INDEX: 27.53 KG/M2 | SYSTOLIC BLOOD PRESSURE: 116 MMHG

## 2022-04-14 DIAGNOSIS — E11.8 DM (DIABETES MELLITUS) WITH COMPLICATIONS (HCC): ICD-10-CM

## 2022-04-14 LAB
HBA1C MFR BLD: 5.8 % (ref 0–5.6)
INT CON NEG: ABNORMAL
INT CON POS: ABNORMAL

## 2022-04-14 PROCEDURE — 83036 HEMOGLOBIN GLYCOSYLATED A1C: CPT

## 2022-04-14 PROCEDURE — RXMED WILLOW AMBULATORY MEDICATION CHARGE: Performed by: NURSE PRACTITIONER

## 2022-04-14 PROCEDURE — 99213 OFFICE O/P EST LOW 20 MIN: CPT | Performed by: PHARMACIST

## 2022-04-14 NOTE — PROGRESS NOTES
New Patient Consult Note  Primary care physician: RAFFAELE Ferreira  In: 1100  Out: 1145    Reason for consult: Management of Uncontrolled Type 2 Diabetes    HPI:  Myron Lilly is a 51 y.o. old patient who comes in today for evaluation of above stated problem.    Pt has been diligent about taking medication and exercising. He goes to the gym or walks at least every day. For meals he usually focuses on low carbohydrates and high protein containing foods.     Most Recent HbA1c:   Lab Results   Component Value Date/Time    HBA1C 5.8 (A) 04/14/2022 12:01 PM    HBA1C 5.6 12/07/2021 10:30 AM    HBA1C 5.4 08/04/2021 01:50 PM          Current Diabetes Regimen:  GLP-1 Agent: Ozempic 1mg   Metformin + SGLT-2 Inhibitor: empagliflozin-metformin(12.5/1000 mg) once daily      Pt doesn't test BG at home  Before Breakfast:  Hypoglycemia:  None    ROS:  Constitutional: No weight loss  Cardiac: No palpitations or racing heart  Resp: No shortness of breath  Neuro: No numbness or tinging in feet  Endo: No heat or cold intolerance, no polyuria or polydipsia  All other systems were reviewed and were negative.    Past Medical History:  Patient Active Problem List    Diagnosis Date Noted   • Encounter to [facility] 01/13/2020   • Chest pain 11/12/2018   • Atrophy of quadriceps femoris muscle 10/30/2018   • Quadriceps muscle rupture, left, subsequent encounter 10/09/2018   • Chronic right shoulder pain 07/03/2018   • Controlled type 2 diabetes mellitus without complication, without long-term current use of insulin ([facility]) 07/03/2018   • Essential hypertension 07/03/2018   • Pure hypercholesterolemia 07/03/2018   • Obesity (BMI 30-39.9) 07/03/2018       Past Surgical History:  Past Surgical History:   Procedure Laterality Date   • SHOULDER ARTHROSCOPY Right 11/5/2018    Procedure: SHOULDER ARTHROSCOPY;  Surgeon: Wesly Weinstein M.D.;  Location: SURGERY [facility];  Service: Orthopedics   • SHOULDER DECOMPRESSION Right  11/5/2018    Procedure: SHOULDER DECOMPRESSION-  W/MINI OPEN SUBACROMIAL;  Surgeon: Wesly Weinstein M.D.;  Location: SURGERY [facility];  Service: Orthopedics   • CLAVICLE DISTAL EXCISION Right 11/5/2018    Procedure: CLAVICLE DISTAL EXCISION, LATERAL CORACOID DECOMPRESSION;  Surgeon: Wesly Weinstein M.D.;  Location: SURGERY [facility];  Service: Orthopedics   • SHOULDER ARTHROSCOPY W/ BICIPITAL TENODESIS REPAIR Right 11/5/2018    Procedure: SHOULDER ARTHROSCOPY W/ BICIPITAL TENODESIS REPAIR;  Surgeon: Wesly Weinstein M.D.;  Location: SURGERY [facility];  Service: Orthopedics   • ORIF, ELBOW Left 1980   • CHOLECYSTECTOMY      approx 2004       Allergies:  Patient has no known allergies.    Social History:  Social History     Socioeconomic History   • Marital status:      Spouse name: Not on file   • Number of children: Not on file   • Years of education: Not on file   • Highest education level: Not on file   Occupational History   • Not on file   Tobacco Use   • Smoking status: Never Smoker   • Smokeless tobacco: Former User   Vaping Use   • Vaping Use: Never used   Substance and Sexual Activity   • Alcohol use: Yes     Comment: 2 per month   • Drug use: No   • Sexual activity: Yes     Partners: Female   Other Topics Concern   •  Service Yes   • Blood Transfusions Not Asked   • Caffeine Concern Not Asked   • Occupational Exposure Not Asked   • Hobby Hazards Not Asked   • Sleep Concern Not Asked   • Stress Concern Not Asked   • Weight Concern Yes   • Special Diet Not Asked   • Back Care Not Asked   • Exercise No   • Bike Helmet Not Asked   • Seat Belt Not Asked   • Self-Exams Not Asked   Social History Narrative   • Not on file     Social Determinants of Health     Financial Resource Strain: Not on file   Food Insecurity: Not on file   Transportation Needs: Not on file   Physical Activity: Not on file   Stress: Not on file   Social Connections: Not on file   Intimate Partner  "Violence: Not on file   Housing Stability: Not on file       Family History:  Family History   Problem Relation Age of Onset   • Diabetes Mother    • Diabetes Father    • Hypertension Father    • Diabetes Brother    • Cancer Child         ALL at 16 months       Medications:    Current Outpatient Medications:   •  amLODIPine (NORVASC) 5 MG Tab, Take 1.5 Tabs by mouth every day., Disp: 45 Tablet, Rfl: 5  •  losartan-hydrochlorothiazide (HYZAAR) 100-25 MG per tablet, Take 1 Tablet by mouth every day., Disp: 30 Tablet, Rfl: 5  •  Empagliflozin-metFORMIN HCl (SYNJARDY) 12.5-1000 MG Tab, Take 1 Tablet by mouth 2 Times a Day., Disp: 60 Tablet, Rfl: 5  •  ergocalciferol (DRISDOL) 11362 UNIT capsule, Take 1 Capsule by mouth every 14 days., Disp: 2 Capsule, Rfl: 0  •  Icosapent Ethyl (VASCEPA) 1 g Cap, Take 2 Caps by mouth 2 Times a Day., Disp: 120 Capsule, Rfl: 5  •  Semaglutide, 1 MG/DOSE, (OZEMPIC, 1 MG/DOSE,) 4 MG/3ML Solution Pen-injector, INJECT 1MG SUBCUTANEOUSLY EVERY SEVEN (7) DAYS AS DIRECTED, Disp: 3 mL, Rfl: 5  •  atorvastatin (LIPITOR) 20 MG Tab, Take 1 Tab by mouth every bedtime., Disp: 90 tablet, Rfl: 3  •  Blood Glucose Monitoring Suppl w/Device Kit, Please dispense 100 test strips and lancets with 5 refills E11.8, Disp: 1 Kit, Rfl: 0  •  glucose blood strip, OneTouch Verio strips, Disp: , Rfl:   •  Blood Glucose Monitoring Suppl (ONETOUCH VERIO FLEX SYSTEM) w/Device Kit, OneTouch Verio System  USE PRN, Disp: , Rfl:   •  meloxicam (MOBIC) 7.5 MG Tab, Take 7.5 mg by mouth every day., Disp: , Rfl:     Labs: Reviewed    Physical Examination:  Vital signs: /81   Ht 1.88 m (6' 2\")   Wt 97.3 kg (214 lb 8 oz)   BMI 27.54 kg/m²  Body mass index is 27.54 kg/m².  General: No apparent distress, cooperative  Eyes: No scleral icterus or discharge  ENMT: Normal on external inspection of nose, lips, normal thyroid exam  Neck: No abnormal masses on inspection  Resp: Normal effort, clear to auscultation bilaterally "   CVS: Regular rate and rhythm, S1 S2 normal, no murmur   Extremities: No edema  Abdomen: abdominal obesity present  Neuro: Alert and oriented  Skin: No rash  Psych: Normal mood and affect, intact memory and able to make informed decisions    Plan:  1. DM  · Discussed the importance of checking BG at home. Recommended to test at least once weekly and given a BG log.  · Discussed the importance of adherence and not missing any medication doses.   · Reminded the patient how adverse events associated with high BG and that he is doing well with his lifestyle and BG control    2. Pt has labs due, get them done before next appt.       Return in about 18 weeks (around 8/18/2022).    Thank you for allowing me to participate in the care of this patient.    Kaden Noland, student pharmacist.  Bartolo Stevens, PharmD     CC:   JANINE Ferreira.

## 2022-04-14 NOTE — NON-PROVIDER
New Patient Consult Note  Primary care physician: RAFFAELE Ferreira    Reason for consult: Management of Uncontrolled Type 2 Diabetes  IN: 1100      HPI:  Myron Lilly is a 51 y.o. old patient who comes in today for evaluation of above stated problem.    Most Recent HbA1c:   Lab Results   Component Value Date/Time    HBA1C 5.6 12/07/2021 10:30 AM    HBA1C 5.4 08/04/2021 01:50 PM    HBA1C 5.6 04/27/2021 10:14 AM               Current Diabetes Regimen:  GLP-1 Agent: Ozempic 1mg     Metformin + SGLT-2 Inhibitor: empagliflozin-metformin(12.5/1000 mg) once daily       Pt doesn't test BG at home  Before Breakfast:  Before Lunch:  Before Dinner:  Before Bedtime:  Other times:  Hypoglycemia:  None        ROS:  Constitutional: No weight loss  Cardiac: No palpitations or racing heart  Resp: No shortness of breath  Neuro: No numbness or tinging in feet  Endo: No heat or cold intolerance, no polyuria or polydipsia  All other systems were reviewed and were negative.    Past Medical History:  Patient Active Problem List    Diagnosis Date Noted   • Encounter to [facility] 01/13/2020   • Chest pain 11/12/2018   • Atrophy of quadriceps femoris muscle 10/30/2018   • Quadriceps muscle rupture, left, subsequent encounter 10/09/2018   • Chronic right shoulder pain 07/03/2018   • Controlled type 2 diabetes mellitus without complication, without long-term current use of insulin ([facility]) 07/03/2018   • Essential hypertension 07/03/2018   • Pure hypercholesterolemia 07/03/2018   • Obesity (BMI 30-39.9) 07/03/2018       Past Surgical History:  Past Surgical History:   Procedure Laterality Date   • SHOULDER ARTHROSCOPY Right 11/5/2018    Procedure: SHOULDER ARTHROSCOPY;  Surgeon: Wesly Weinstein M.D.;  Location: SURGERY [facility];  Service: Orthopedics   • SHOULDER DECOMPRESSION Right 11/5/2018    Procedure: SHOULDER DECOMPRESSION-  W/MINI OPEN SUBACROMIAL;  Surgeon: Wesly Weinstein M.D.;  Location: SURGERY [facility];  Service: Orthopedics   • CLAVICLE DISTAL EXCISION Right 11/5/2018    Procedure: CLAVICLE DISTAL EXCISION, LATERAL CORACOID DECOMPRESSION;  Surgeon: Wesly Weinstein M.D.;  Location: SURGERY [facility];  Service: Orthopedics   • SHOULDER ARTHROSCOPY W/ BICIPITAL TENODESIS REPAIR Right 11/5/2018    Procedure: SHOULDER ARTHROSCOPY W/ BICIPITAL TENODESIS REPAIR;  Surgeon: Wesly Weinstein M.D.;  Location: SURGERY [facility];  Service: Orthopedics   • ORIF, ELBOW Left 1980   • CHOLECYSTECTOMY      approx 2004       Allergies:  Patient has no known allergies.    Social History:  Social History     Socioeconomic History   • Marital status:      Spouse name: Not on file   • Number of children: Not on file   • Years of education: Not on file   • Highest education level: Not on file   Occupational History   • Not on file   Tobacco Use   • Smoking status: Never Smoker   • Smokeless tobacco: Former User   Vaping Use   • Vaping Use: Never used   Substance and Sexual Activity   • Alcohol use: Yes     Comment: 2 per month   • Drug use: No   • Sexual activity: Yes     Partners: Female   Other Topics Concern   •  Service Yes   • Blood Transfusions Not Asked   • Caffeine Concern Not Asked   • Occupational Exposure Not Asked   • Hobby Hazards Not Asked   • Sleep Concern Not Asked   • Stress Concern Not Asked   • Weight Concern Yes   • Special Diet Not Asked   • Back Care Not Asked   • Exercise No   • Bike Helmet Not Asked   • Seat Belt Not Asked   • Self-Exams Not Asked   Social History Narrative   • Not on file     Social Determinants of Health     Financial Resource Strain: Not on file   Food Insecurity: Not on file   Transportation Needs: Not on file   Physical Activity: Not on file   Stress: Not on file   Social Connections: Not on file   Intimate Partner Violence: Not on file   Housing Stability: Not on file       Family History:  Family History   Problem Relation Age of Onset   •  Diabetes Mother    • Diabetes Father    • Hypertension Father    • Diabetes Brother    • Cancer Child         ALL at 16 months       Medications:    Current Outpatient Medications:   •  amLODIPine (NORVASC) 5 MG Tab, Take 1.5 Tabs by mouth every day., Disp: 45 Tablet, Rfl: 5  •  losartan-hydrochlorothiazide (HYZAAR) 100-25 MG per tablet, Take 1 Tablet by mouth every day., Disp: 30 Tablet, Rfl: 5  •  Empagliflozin-metFORMIN HCl (SYNJARDY) 12.5-1000 MG Tab, Take 1 Tablet by mouth 2 Times a Day., Disp: 60 Tablet, Rfl: 5  •  ergocalciferol (DRISDOL) 85912 UNIT capsule, Take 1 Capsule by mouth every 14 days., Disp: 2 Capsule, Rfl: 0  •  Icosapent Ethyl (VASCEPA) 1 g Cap, Take 2 Caps by mouth 2 Times a Day., Disp: 120 Capsule, Rfl: 5  •  Semaglutide, 1 MG/DOSE, (OZEMPIC, 1 MG/DOSE,) 4 MG/3ML Solution Pen-injector, INJECT 1MG SUBCUTANEOUSLY EVERY SEVEN (7) DAYS AS DIRECTED, Disp: 3 mL, Rfl: 5  •  atorvastatin (LIPITOR) 20 MG Tab, Take 1 Tab by mouth every bedtime., Disp: 90 tablet, Rfl: 3  •  Blood Glucose Monitoring Suppl w/Device Kit, Please dispense 100 test strips and lancets with 5 refills E11.8, Disp: 1 Kit, Rfl: 0  •  glucose blood strip, OneTouch Verio strips, Disp: , Rfl:   •  Blood Glucose Monitoring Suppl (ONETOUCH VERIO FLEX SYSTEM) w/Device Kit, OneTouch Verio System  USE PRN, Disp: , Rfl:   •  meloxicam (MOBIC) 7.5 MG Tab, Take 7.5 mg by mouth every day., Disp: , Rfl:     Labs: Reviewed    Physical Examination:  Vital signs: There were no vitals taken for this visit. There is no height or weight on file to calculate BMI.  General: No apparent distress, cooperative  Eyes: No scleral icterus or discharge  ENMT: Normal on external inspection of nose, lips, normal thyroid exam  Neck: No abnormal masses on inspection  Resp: Normal effort, clear to auscultation bilaterally   CVS: Regular rate and rhythm, S1 S2 normal, no murmur   Extremities: No edema  Abdomen: abdominal obesity present  Neuro: Alert and  oriented  Skin: No rash  Psych: Normal mood and affect, intact memory and able to make informed decisions    Plan:    Refills:       No follow-ups on file.    Thank you for allowing me to participate in the care of this patient.    Kaden Noland, Pharmacy Intern [facility] Board Certified Advance Diabetes Specialist  04/14/22    CC:   RAFFAELE Ferreira

## 2022-04-15 ENCOUNTER — PHARMACY VISIT (OUTPATIENT)
Dept: PHARMACY | Facility: MEDICAL CENTER | Age: 52
End: 2022-04-15
Payer: COMMERCIAL

## 2022-05-11 DIAGNOSIS — E11.9 CONTROLLED TYPE 2 DIABETES MELLITUS WITHOUT COMPLICATION, WITHOUT LONG-TERM CURRENT USE OF INSULIN (HCC): ICD-10-CM

## 2022-05-11 PROCEDURE — RXMED WILLOW AMBULATORY MEDICATION CHARGE: Performed by: NURSE PRACTITIONER

## 2022-05-11 RX ORDER — SEMAGLUTIDE 1.34 MG/ML
INJECTION, SOLUTION SUBCUTANEOUS
Qty: 3 ML | Refills: 5 | OUTPATIENT
Start: 2022-05-11 | End: 2023-05-11

## 2022-05-16 ENCOUNTER — PHARMACY VISIT (OUTPATIENT)
Dept: PHARMACY | Facility: MEDICAL CENTER | Age: 52
End: 2022-05-16
Payer: COMMERCIAL

## 2022-05-16 DIAGNOSIS — E11.9 CONTROLLED TYPE 2 DIABETES MELLITUS WITHOUT COMPLICATION, WITHOUT LONG-TERM CURRENT USE OF INSULIN (HCC): ICD-10-CM

## 2022-05-16 PROCEDURE — RXMED WILLOW AMBULATORY MEDICATION CHARGE: Performed by: NURSE PRACTITIONER

## 2022-05-16 RX ORDER — SEMAGLUTIDE 1.34 MG/ML
INJECTION, SOLUTION SUBCUTANEOUS
Qty: 3 ML | Refills: 5 | OUTPATIENT
Start: 2022-05-16 | End: 2023-05-16

## 2022-05-21 PROCEDURE — RXMED WILLOW AMBULATORY MEDICATION CHARGE: Performed by: NURSE PRACTITIONER

## 2022-05-26 ENCOUNTER — PHARMACY VISIT (OUTPATIENT)
Dept: PHARMACY | Facility: MEDICAL CENTER | Age: 52
End: 2022-05-26
Payer: COMMERCIAL

## 2022-05-26 DIAGNOSIS — E11.9 CONTROLLED TYPE 2 DIABETES MELLITUS WITHOUT COMPLICATION, WITHOUT LONG-TERM CURRENT USE OF INSULIN (HCC): ICD-10-CM

## 2022-05-26 DIAGNOSIS — E55.9 VITAMIN D DEFICIENCY: ICD-10-CM

## 2022-05-26 PROCEDURE — RXMED WILLOW AMBULATORY MEDICATION CHARGE: Performed by: NURSE PRACTITIONER

## 2022-05-26 RX ORDER — SEMAGLUTIDE 1.34 MG/ML
INJECTION, SOLUTION SUBCUTANEOUS
Qty: 3 ML | Refills: 5 | Status: SHIPPED | OUTPATIENT
Start: 2022-05-26 | End: 2022-05-26 | Stop reason: CLARIF

## 2022-05-26 RX ORDER — ERGOCALCIFEROL 1.25 MG/1
50000 CAPSULE ORAL
Qty: 2 CAPSULE | Refills: 5 | Status: SHIPPED | OUTPATIENT
Start: 2022-05-26 | End: 2023-06-27

## 2022-05-27 PROCEDURE — RXMED WILLOW AMBULATORY MEDICATION CHARGE: Performed by: NURSE PRACTITIONER

## 2022-06-08 PROCEDURE — RXMED WILLOW AMBULATORY MEDICATION CHARGE: Performed by: NURSE PRACTITIONER

## 2022-06-09 DIAGNOSIS — E78.5 DYSLIPIDEMIA: ICD-10-CM

## 2022-06-09 RX ORDER — ATORVASTATIN CALCIUM 20 MG/1
20 TABLET, FILM COATED ORAL
Qty: 90 TABLET | Refills: 3 | OUTPATIENT
Start: 2022-06-09

## 2022-06-15 PROCEDURE — RXMED WILLOW AMBULATORY MEDICATION CHARGE: Performed by: NURSE PRACTITIONER

## 2022-06-24 ENCOUNTER — PHARMACY VISIT (OUTPATIENT)
Dept: PHARMACY | Facility: MEDICAL CENTER | Age: 52
End: 2022-06-24
Payer: COMMERCIAL

## 2022-06-28 DIAGNOSIS — E78.5 DYSLIPIDEMIA: ICD-10-CM

## 2022-06-28 RX ORDER — ATORVASTATIN CALCIUM 20 MG/1
20 TABLET, FILM COATED ORAL
Qty: 90 TABLET | Refills: 3 | OUTPATIENT
Start: 2022-06-28

## 2022-07-06 DIAGNOSIS — E78.5 DYSLIPIDEMIA: ICD-10-CM

## 2022-07-06 RX ORDER — ATORVASTATIN CALCIUM 20 MG/1
20 TABLET, FILM COATED ORAL
Qty: 90 TABLET | Refills: 3 | OUTPATIENT
Start: 2022-07-06

## 2022-07-10 PROCEDURE — RXMED WILLOW AMBULATORY MEDICATION CHARGE: Performed by: NURSE PRACTITIONER

## 2022-08-01 ENCOUNTER — PHARMACY VISIT (OUTPATIENT)
Dept: PHARMACY | Facility: MEDICAL CENTER | Age: 52
End: 2022-08-01
Payer: COMMERCIAL

## 2022-09-08 ENCOUNTER — PHARMACY VISIT (OUTPATIENT)
Dept: PHARMACY | Facility: MEDICAL CENTER | Age: 52
End: 2022-09-08
Payer: COMMERCIAL

## 2022-09-08 PROCEDURE — RXMED WILLOW AMBULATORY MEDICATION CHARGE: Performed by: NURSE PRACTITIONER

## 2022-09-21 DIAGNOSIS — E78.1 HIGH TRIGLYCERIDES: ICD-10-CM

## 2022-09-21 RX ORDER — ICOSAPENT ETHYL 1000 MG/1
2 CAPSULE ORAL 2 TIMES DAILY
Qty: 120 CAPSULE | Refills: 0 | OUTPATIENT
Start: 2022-09-21

## 2022-09-28 DIAGNOSIS — I10 ESSENTIAL HYPERTENSION: ICD-10-CM

## 2022-09-28 DIAGNOSIS — E11.8 DM (DIABETES MELLITUS) WITH COMPLICATIONS (HCC): ICD-10-CM

## 2022-09-28 RX ORDER — LOSARTAN POTASSIUM AND HYDROCHLOROTHIAZIDE 25; 100 MG/1; MG/1
1 TABLET ORAL DAILY
Qty: 30 TABLET | Refills: 5 | OUTPATIENT
Start: 2022-09-28

## 2022-09-28 RX ORDER — EMPAGLIFLOZIN AND METFORMIN HYDROCHLORIDE 12.5; 1 MG/1; MG/1
1 TABLET ORAL
Qty: 60 TABLET | Refills: 0 | OUTPATIENT
Start: 2022-09-28

## 2022-10-03 PROCEDURE — RXMED WILLOW AMBULATORY MEDICATION CHARGE: Performed by: NURSE PRACTITIONER

## 2022-10-05 PROCEDURE — RXMED WILLOW AMBULATORY MEDICATION CHARGE: Performed by: NURSE PRACTITIONER

## 2022-10-13 ENCOUNTER — PHARMACY VISIT (OUTPATIENT)
Dept: PHARMACY | Facility: MEDICAL CENTER | Age: 52
End: 2022-10-13
Payer: COMMERCIAL

## 2022-10-14 PROCEDURE — RXMED WILLOW AMBULATORY MEDICATION CHARGE: Performed by: NURSE PRACTITIONER

## 2022-11-03 ENCOUNTER — PHARMACY VISIT (OUTPATIENT)
Dept: PHARMACY | Facility: MEDICAL CENTER | Age: 52
End: 2022-11-03
Payer: COMMERCIAL

## 2022-11-03 DIAGNOSIS — I10 ESSENTIAL HYPERTENSION: ICD-10-CM

## 2022-11-03 DIAGNOSIS — E11.8 DM (DIABETES MELLITUS) WITH COMPLICATIONS (HCC): ICD-10-CM

## 2022-11-03 DIAGNOSIS — E78.5 DYSLIPIDEMIA: ICD-10-CM

## 2022-11-03 DIAGNOSIS — E78.1 HIGH TRIGLYCERIDES: ICD-10-CM

## 2022-11-03 PROCEDURE — RXMED WILLOW AMBULATORY MEDICATION CHARGE: Performed by: NURSE PRACTITIONER

## 2022-11-03 RX ORDER — AMLODIPINE BESYLATE 5 MG/1
7.5 TABLET ORAL DAILY
Qty: 45 TABLET | Refills: 5 | Status: SHIPPED | OUTPATIENT
Start: 2022-11-03 | End: 2023-06-27

## 2022-11-03 RX ORDER — ICOSAPENT ETHYL 1000 MG/1
2 CAPSULE ORAL 2 TIMES DAILY
Qty: 120 CAPSULE | Refills: 5 | Status: SHIPPED | OUTPATIENT
Start: 2022-11-03 | End: 2023-06-27

## 2022-11-03 RX ORDER — EMPAGLIFLOZIN AND METFORMIN HYDROCHLORIDE 12.5; 1 MG/1; MG/1
1 TABLET ORAL
Qty: 60 TABLET | Refills: 5 | Status: SHIPPED | OUTPATIENT
Start: 2022-11-03 | End: 2023-06-27 | Stop reason: SDUPTHER

## 2022-11-03 RX ORDER — ATORVASTATIN CALCIUM 20 MG/1
20 TABLET, FILM COATED ORAL
Qty: 90 TABLET | Refills: 3 | Status: SHIPPED | OUTPATIENT
Start: 2022-11-03 | End: 2023-12-04

## 2022-11-03 RX ORDER — LOSARTAN POTASSIUM AND HYDROCHLOROTHIAZIDE 25; 100 MG/1; MG/1
1 TABLET ORAL DAILY
Qty: 30 TABLET | Refills: 5 | Status: SHIPPED | OUTPATIENT
Start: 2022-11-03 | End: 2023-06-27 | Stop reason: SDUPTHER

## 2022-11-03 RX ORDER — SEMAGLUTIDE 1.34 MG/ML
INJECTION, SOLUTION SUBCUTANEOUS
Qty: 3 ML | Refills: 5 | Status: SHIPPED | OUTPATIENT
Start: 2022-11-03 | End: 2023-06-27 | Stop reason: SDUPTHER

## 2022-11-14 ENCOUNTER — PHARMACY VISIT (OUTPATIENT)
Dept: PHARMACY | Facility: MEDICAL CENTER | Age: 52
End: 2022-11-14
Payer: COMMERCIAL

## 2022-12-01 PROCEDURE — RXMED WILLOW AMBULATORY MEDICATION CHARGE: Performed by: NURSE PRACTITIONER

## 2022-12-07 PROCEDURE — RXMED WILLOW AMBULATORY MEDICATION CHARGE: Performed by: NURSE PRACTITIONER

## 2022-12-20 ENCOUNTER — PHARMACY VISIT (OUTPATIENT)
Dept: PHARMACY | Facility: MEDICAL CENTER | Age: 52
End: 2022-12-20
Payer: COMMERCIAL

## 2023-01-10 DIAGNOSIS — E55.9 VITAMIN D DEFICIENCY: ICD-10-CM

## 2023-01-10 RX ORDER — ERGOCALCIFEROL 1.25 MG/1
50000 CAPSULE ORAL
Qty: 2 CAPSULE | Refills: 5 | OUTPATIENT
Start: 2023-01-10

## 2023-01-12 PROCEDURE — RXMED WILLOW AMBULATORY MEDICATION CHARGE: Performed by: NURSE PRACTITIONER

## 2023-01-20 ENCOUNTER — PHARMACY VISIT (OUTPATIENT)
Dept: PHARMACY | Facility: MEDICAL CENTER | Age: 53
End: 2023-01-20
Payer: COMMERCIAL

## 2023-02-13 PROCEDURE — RXMED WILLOW AMBULATORY MEDICATION CHARGE: Performed by: NURSE PRACTITIONER

## 2023-02-27 ENCOUNTER — PHARMACY VISIT (OUTPATIENT)
Dept: PHARMACY | Facility: MEDICAL CENTER | Age: 53
End: 2023-02-27
Payer: COMMERCIAL

## 2023-03-22 PROCEDURE — RXMED WILLOW AMBULATORY MEDICATION CHARGE: Performed by: NURSE PRACTITIONER

## 2023-03-31 ENCOUNTER — PHARMACY VISIT (OUTPATIENT)
Dept: PHARMACY | Facility: MEDICAL CENTER | Age: 53
End: 2023-03-31
Payer: COMMERCIAL

## 2023-04-24 PROCEDURE — RXMED WILLOW AMBULATORY MEDICATION CHARGE: Performed by: NURSE PRACTITIONER

## 2023-05-04 ENCOUNTER — PHARMACY VISIT (OUTPATIENT)
Dept: PHARMACY | Facility: MEDICAL CENTER | Age: 53
End: 2023-05-04
Payer: COMMERCIAL

## 2023-05-29 DIAGNOSIS — E11.8 DM (DIABETES MELLITUS) WITH COMPLICATIONS (HCC): ICD-10-CM

## 2023-05-29 DIAGNOSIS — I10 ESSENTIAL HYPERTENSION: ICD-10-CM

## 2023-05-29 PROCEDURE — RXMED WILLOW AMBULATORY MEDICATION CHARGE: Performed by: NURSE PRACTITIONER

## 2023-05-30 RX ORDER — LOSARTAN POTASSIUM AND HYDROCHLOROTHIAZIDE 25; 100 MG/1; MG/1
1 TABLET ORAL DAILY
Qty: 30 TABLET | Refills: 5 | OUTPATIENT
Start: 2023-05-30

## 2023-05-30 RX ORDER — AMLODIPINE BESYLATE 5 MG/1
7.5 TABLET ORAL DAILY
Qty: 45 TABLET | Refills: 5 | OUTPATIENT
Start: 2023-05-30

## 2023-05-30 RX ORDER — EMPAGLIFLOZIN AND METFORMIN HYDROCHLORIDE 12.5; 1 MG/1; MG/1
1 TABLET ORAL
Qty: 60 TABLET | Refills: 5 | OUTPATIENT
Start: 2023-05-30

## 2023-06-01 ENCOUNTER — PHARMACY VISIT (OUTPATIENT)
Dept: PHARMACY | Facility: MEDICAL CENTER | Age: 53
End: 2023-06-01
Payer: COMMERCIAL

## 2023-06-26 PROCEDURE — RXMED WILLOW AMBULATORY MEDICATION CHARGE: Performed by: NURSE PRACTITIONER

## 2023-06-27 ENCOUNTER — OFFICE VISIT (OUTPATIENT)
Dept: MEDICAL GROUP | Facility: MEDICAL CENTER | Age: 53
End: 2023-06-27
Attending: FAMILY MEDICINE
Payer: MEDICAID

## 2023-06-27 VITALS
HEART RATE: 68 BPM | SYSTOLIC BLOOD PRESSURE: 102 MMHG | RESPIRATION RATE: 16 BRPM | WEIGHT: 212.8 LBS | DIASTOLIC BLOOD PRESSURE: 66 MMHG | BODY MASS INDEX: 29.79 KG/M2 | TEMPERATURE: 97.7 F | HEIGHT: 71 IN | OXYGEN SATURATION: 96 %

## 2023-06-27 DIAGNOSIS — Z12.11 SCREENING FOR COLORECTAL CANCER: ICD-10-CM

## 2023-06-27 DIAGNOSIS — I10 ESSENTIAL HYPERTENSION: ICD-10-CM

## 2023-06-27 DIAGNOSIS — Z12.12 SCREENING FOR COLORECTAL CANCER: ICD-10-CM

## 2023-06-27 DIAGNOSIS — E11.9 CONTROLLED TYPE 2 DIABETES MELLITUS WITHOUT COMPLICATION, WITHOUT LONG-TERM CURRENT USE OF INSULIN (HCC): ICD-10-CM

## 2023-06-27 DIAGNOSIS — E78.5 DYSLIPIDEMIA: ICD-10-CM

## 2023-06-27 DIAGNOSIS — E55.9 VITAMIN D DEFICIENCY: ICD-10-CM

## 2023-06-27 PROBLEM — S76.112D: Status: RESOLVED | Noted: 2018-10-09 | Resolved: 2023-06-27

## 2023-06-27 PROBLEM — G89.29 CHRONIC RIGHT SHOULDER PAIN: Status: RESOLVED | Noted: 2018-07-03 | Resolved: 2023-06-27

## 2023-06-27 PROBLEM — M62.559 ATROPHY OF QUADRICEPS FEMORIS MUSCLE: Status: RESOLVED | Noted: 2018-10-30 | Resolved: 2023-06-27

## 2023-06-27 PROBLEM — M25.511 CHRONIC RIGHT SHOULDER PAIN: Status: RESOLVED | Noted: 2018-07-03 | Resolved: 2023-06-27

## 2023-06-27 PROBLEM — R07.9 CHEST PAIN: Status: RESOLVED | Noted: 2018-11-12 | Resolved: 2023-06-27

## 2023-06-27 PROBLEM — E66.9 OBESITY (BMI 30-39.9): Status: RESOLVED | Noted: 2018-07-03 | Resolved: 2023-06-27

## 2023-06-27 PROBLEM — Z76.89 ENCOUNTER TO ESTABLISH CARE: Status: RESOLVED | Noted: 2020-01-13 | Resolved: 2023-06-27

## 2023-06-27 PROCEDURE — 99204 OFFICE O/P NEW MOD 45 MIN: CPT | Performed by: FAMILY MEDICINE

## 2023-06-27 PROCEDURE — RXMED WILLOW AMBULATORY MEDICATION CHARGE: Performed by: FAMILY MEDICINE

## 2023-06-27 PROCEDURE — 3074F SYST BP LT 130 MM HG: CPT | Performed by: FAMILY MEDICINE

## 2023-06-27 PROCEDURE — 3078F DIAST BP <80 MM HG: CPT | Performed by: FAMILY MEDICINE

## 2023-06-27 PROCEDURE — 99212 OFFICE O/P EST SF 10 MIN: CPT | Performed by: FAMILY MEDICINE

## 2023-06-27 RX ORDER — GLIPIZIDE 10 MG/1
TABLET ORAL
COMMUNITY
End: 2023-06-27

## 2023-06-27 RX ORDER — LOSARTAN POTASSIUM AND HYDROCHLOROTHIAZIDE 25; 100 MG/1; MG/1
1 TABLET ORAL
COMMUNITY
End: 2023-06-27

## 2023-06-27 RX ORDER — AMLODIPINE AND BENAZEPRIL HYDROCHLORIDE 10; 40 MG/1; MG/1
1 CAPSULE ORAL
COMMUNITY
End: 2023-06-27

## 2023-06-27 RX ORDER — EMPAGLIFLOZIN AND METFORMIN HYDROCHLORIDE 12.5; 1 MG/1; MG/1
1 TABLET ORAL
Qty: 180 TABLET | Refills: 3 | Status: SHIPPED | OUTPATIENT
Start: 2023-06-27 | End: 2023-09-25

## 2023-06-27 RX ORDER — ATORVASTATIN CALCIUM 20 MG/1
20 TABLET, FILM COATED ORAL
Qty: 90 TABLET | Refills: 3 | Status: SHIPPED | OUTPATIENT
Start: 2023-06-27

## 2023-06-27 RX ORDER — LOSARTAN POTASSIUM AND HYDROCHLOROTHIAZIDE 25; 100 MG/1; MG/1
1 TABLET ORAL DAILY
Qty: 90 TABLET | Refills: 3 | Status: SHIPPED | OUTPATIENT
Start: 2023-06-27

## 2023-06-27 RX ORDER — ASPIRIN 325 MG
TABLET, DELAYED RELEASE (ENTERIC COATED) ORAL
COMMUNITY
End: 2023-06-27

## 2023-06-27 RX ORDER — SEMAGLUTIDE 1.34 MG/ML
INJECTION, SOLUTION SUBCUTANEOUS
Qty: 3 ML | Refills: 5 | Status: SHIPPED | OUTPATIENT
Start: 2023-06-27 | End: 2023-12-04

## 2023-06-27 RX ORDER — LOVASTATIN 20 MG/1
TABLET ORAL
COMMUNITY
End: 2023-06-27

## 2023-06-27 RX ORDER — LOSARTAN POTASSIUM 100 MG/1
TABLET ORAL
COMMUNITY
End: 2023-06-27

## 2023-06-27 RX ORDER — EMPAGLIFLOZIN 25 MG/1
TABLET, FILM COATED ORAL
COMMUNITY
End: 2023-06-27

## 2023-06-27 ASSESSMENT — PATIENT HEALTH QUESTIONNAIRE - PHQ9: CLINICAL INTERPRETATION OF PHQ2 SCORE: 0

## 2023-06-27 NOTE — PROGRESS NOTES
Subjective:     CC:    Chief Complaint   Patient presents with    [facility] Care    Diabetes Mellitus       HISTORY OF THE PRESENT ILLNESS: Patient is a 52 y.o. male. This pleasant patient is here today to establish primary care with me and discuss the following issues.   His prior PCP was Roxana NASSAR ; last seen 1/13/2020    Specialists   None    He is here primarily for medication refill as previous PCP is no longer in practice. He was receiving medications for treatment of high blood pressure, cholesterol, and diabetes. States that conditions were previously poorly controlled when first diagnosed, but has made significant improvements with lifestyle and conditions all have been improved and stable.     Allergies: Patient has no known allergies.      [facility]  [address] [place] [id number]  Phone: [phone] Fax: [phone]      Current Outpatient Medications   Medication Sig Dispense Refill    Semaglutide, 1 MG/DOSE, (OZEMPIC, 1 MG/DOSE,) 4 MG/3ML Solution Pen-injector Inject 1 mg under the skin every 7 days 3 mL 5    losartan-hydrochlorothiazide (HYZAAR) 100-25 MG per tablet Take 1 Tablet by mouth every day. 90 Tablet 3    Empagliflozin-metFORMIN HCl (SYNJARDY) 12.5-1000 MG Tab Take 1 Tablet by mouth 2 times a day for 90 days. 180 Tablet 3    atorvastatin (LIPITOR) 20 MG Tab Take 1 Tablet by mouth every bedtime. 90 Tablet 3     No current facility-administered medications for this visit.       Past Medical History:   Diagnosis Date    Arthritis     osteo-back    Atrophy of quadriceps femoris muscle 10/30/2018    Chest pain 11/12/2018    Chronic right shoulder pain 7/3/2018    Diabetes (HCC) 10/2018    oral meds    High cholesterol     Hypertension     Obesity (BMI 30-39.9) 7/3/2018    Pain 10/2018    Left quad    Quadriceps muscle rupture, left, subsequent encounter 10/9/2018       Past Surgical History:   Procedure Laterality Date    SHOULDER ARTHROSCOPY Right 11/5/2018    Procedure:  "SHOULDER ARTHROSCOPY;  Surgeon: Wesly Weinstein M.D.;  Location: SURGERY [facility];  Service: Orthopedics    SHOULDER DECOMPRESSION Right 11/5/2018    Procedure: SHOULDER DECOMPRESSION-  W/MINI OPEN SUBACROMIAL;  Surgeon: Wesly Weinstein M.D.;  Location: SURGERY [facility];  Service: Orthopedics    CLAVICLE DISTAL EXCISION Right 11/5/2018    Procedure: CLAVICLE DISTAL EXCISION, LATERAL CORACOID DECOMPRESSION;  Surgeon: Wesly Weinstein M.D.;  Location: SURGERY [facility];  Service: Orthopedics    SHOULDER ARTHROSCOPY W/ BICIPITAL TENODESIS REPAIR Right 11/5/2018    Procedure: SHOULDER ARTHROSCOPY W/ BICIPITAL TENODESIS REPAIR;  Surgeon: Wesly Weinstein M.D.;  Location: SURGERY [facility];  Service: Orthopedics    ORIF, ELBOW Left 1980    CHOLECYSTECTOMY      approx 2004       Social History     Tobacco Use    Smoking status: Never    Smokeless tobacco: Former   Vaping Use    Vaping Use: Never used   Substance Use Topics    Alcohol use: Yes     Comment: 2 per month    Drug use: Never       Family History   Problem Relation Age of Onset    Diabetes Mother     Diabetes Father     Hypertension Father     Diabetes Brother     Cancer Child         ALL at 16 months         Objective:     /66   Pulse 68   Temp 36.5 °C (97.7 °F)   Resp 16   Ht 1.803 m (5' 10.98\")   Wt 96.5 kg (212 lb 12.8 oz)   SpO2 96%   BMI 29.69 kg/m²   Physical Exam  Constitutional: Alert, no distress  Skin: No rashes in visible areas  Eye: Conjunctiva clear, lids normal  Respiratory: Unlabored respiratory effort, no cough, lungs clear to auscultation bilaterally  CV: RRR  MSK: Normal gait, moves all extremities  Psych: Alert and oriented x3, normal affect and mood      Assessment & Plan:   52 y.o. male with the following -    1. Controlled type 2 diabetes mellitus without complication, without long-term current use of insulin (HCC)  Chronic, controlled.  Last hemoglobin A1c is under 7.0%.  He is due for " diabetic retinal screenings, on an ARB for renal protection, and is on a statin for cardiovascular protection. Encouraged continued diet and exercise modifications to help control blood sugars. BP at goal.  We will continue the current regimen. May adjust medications further pending results  - Comp Metabolic Panel; Future  - Hemoglobin A1c; Future  - Lipid Profile; Future  - Microalbumin Creat Ratio Urine - Lab Collect; Future  - CBC WITH DIFFERENTIAL; Future  - Semaglutide, 1 MG/DOSE, (OZEMPIC, 1 MG/DOSE,) 4 MG/3ML Solution Pen-injector; Inject 1 mg under the skin every 7 days  Dispense: 3 mL; Refill: 5  - Empagliflozin-metFORMIN HCl (SYNJARDY) 12.5-1000 MG Tab; Take 1 Tablet by mouth 2 times a day for 90 days.  Dispense: 180 Tablet; Refill: 3    2. Essential hypertension  - Chronic well controlled; recommend discontinuing amlodipine given BP well below goal  - Follow up in 1 week for walk-in BP check to insure stability  - losartan-hydrochlorothiazide (HYZAAR) 100-25 MG per tablet; Take 1 Tablet by mouth every day.  Dispense: 90 Tablet; Refill: 3    3. Dyslipidemia  - Previously prescribed Vascepa; due for updated lab assessment. Recommend discontinuing vascepa and continuing with stain for now.  - atorvastatin (LIPITOR) 20 MG Tab; Take 1 Tablet by mouth every bedtime.  Dispense: 90 Tablet; Refill: 3    4. Vitamin D deficiency  - Reassess with lab to determine need for supplementation  - VITAMIN D,25 HYDROXY (DEFICIENCY); Future    5. Screening for colorectal cancer  - Referral to GI for Colonoscopy    Return in about 1 week (around 7/4/2023) for blood pressure check.    Please note that this dictation was created using voice recognition software. I have made every reasonable attempt to correct obvious errors, but I expect that there are errors of grammar and possibly content that I did not discover before finalizing the note.

## 2023-07-06 ENCOUNTER — PHARMACY VISIT (OUTPATIENT)
Dept: PHARMACY | Facility: MEDICAL CENTER | Age: 53
End: 2023-07-06
Payer: COMMERCIAL

## 2023-07-06 ENCOUNTER — NON-PROVIDER VISIT (OUTPATIENT)
Dept: MEDICAL GROUP | Facility: MEDICAL CENTER | Age: 53
End: 2023-07-06
Attending: FAMILY MEDICINE
Payer: MEDICAID

## 2023-07-06 NOTE — PROGRESS NOTES
Myron Lilly is a 52 y.o. male here for a non-provider visit for bp check 140/88    If abnormal was an in office provider notified today (if so, indicate provider)? Yes    Routed to PCP? Yes

## 2023-08-16 PROCEDURE — RXMED WILLOW AMBULATORY MEDICATION CHARGE: Performed by: FAMILY MEDICINE

## 2023-08-16 PROCEDURE — RXMED WILLOW AMBULATORY MEDICATION CHARGE: Performed by: NURSE PRACTITIONER

## 2023-08-17 PROCEDURE — RXMED WILLOW AMBULATORY MEDICATION CHARGE: Performed by: FAMILY MEDICINE

## 2023-08-22 ENCOUNTER — PHARMACY VISIT (OUTPATIENT)
Dept: PHARMACY | Facility: MEDICAL CENTER | Age: 53
End: 2023-08-22
Payer: COMMERCIAL

## 2023-12-04 PROBLEM — M23.8X1: Status: ACTIVE | Noted: 2023-12-04

## 2024-05-25 ENCOUNTER — OFFICE VISIT (OUTPATIENT)
Dept: URGENT CARE | Facility: CLINIC | Age: 54
End: 2024-05-25
Payer: COMMERCIAL

## 2024-05-25 VITALS
HEART RATE: 70 BPM | BODY MASS INDEX: 29.39 KG/M2 | DIASTOLIC BLOOD PRESSURE: 98 MMHG | SYSTOLIC BLOOD PRESSURE: 130 MMHG | HEIGHT: 72 IN | TEMPERATURE: 96.3 F | OXYGEN SATURATION: 94 % | RESPIRATION RATE: 16 BRPM | WEIGHT: 217 LBS

## 2024-05-25 DIAGNOSIS — J01.40 ACUTE NON-RECURRENT PANSINUSITIS: ICD-10-CM

## 2024-05-25 DIAGNOSIS — H66.002 NON-RECURRENT ACUTE SUPPURATIVE OTITIS MEDIA OF LEFT EAR WITHOUT SPONTANEOUS RUPTURE OF TYMPANIC MEMBRANE: ICD-10-CM

## 2024-05-25 PROCEDURE — 3080F DIAST BP >= 90 MM HG: CPT | Performed by: PHYSICIAN ASSISTANT

## 2024-05-25 PROCEDURE — 3075F SYST BP GE 130 - 139MM HG: CPT | Performed by: PHYSICIAN ASSISTANT

## 2024-05-25 PROCEDURE — 99203 OFFICE O/P NEW LOW 30 MIN: CPT | Performed by: PHYSICIAN ASSISTANT

## 2024-05-25 RX ORDER — AMOXICILLIN AND CLAVULANATE POTASSIUM 875; 125 MG/1; MG/1
1 TABLET, FILM COATED ORAL 2 TIMES DAILY
Qty: 10 TABLET | Refills: 0 | Status: SHIPPED | OUTPATIENT
Start: 2024-05-25 | End: 2024-05-30

## 2024-05-25 ASSESSMENT — ENCOUNTER SYMPTOMS
SORE THROAT: 0
VOMITING: 0
EYE DISCHARGE: 0
EYE REDNESS: 0
NAUSEA: 0
DIARRHEA: 0
SINUS PAIN: 1
FEVER: 0
EYE PAIN: 0
ABDOMINAL PAIN: 0
COUGH: 0
DIZZINESS: 0
HEADACHES: 0
CONSTIPATION: 0
DIAPHORESIS: 0
SHORTNESS OF BREATH: 0
WHEEZING: 0
CHILLS: 0

## 2024-05-25 NOTE — PROGRESS NOTES
Subjective:     Myron Lilly  is a 53 y.o. male who presents for Congestion (Pressure, congestion, left ear ache x 3 days )       He presents today for evaluation of sinus pain pressure and left-sided ear pains ongoing for last 2 days.  He does have history of recurrent sinus infections and ear infections due to his time in the .  [facility] and they do arise, states that he is well treated with amoxicillin for 5 days and this has worked well for him in the past.  At this time he denies fever/chills/sweats, chest pain, shortness of breath, nausea/vomiting, abdominal pain, diarrhea.  Has been using over-the-counter occasions for symptoms       Review of Systems   Constitutional:  Negative for chills, diaphoresis, fever and malaise/fatigue.   HENT:  Positive for congestion, ear pain and sinus pain. Negative for ear discharge and sore throat.    Eyes:  Negative for pain, discharge and redness.   Respiratory:  Negative for cough, shortness of breath and wheezing.    Cardiovascular:  Negative for chest pain.   Gastrointestinal:  Negative for abdominal pain, constipation, diarrhea, nausea and vomiting.   Neurological:  Negative for dizziness and headaches.      No Known Allergies  Past Medical History:   Diagnosis Date    Arthritis     osteo-back    Atrophy of quadriceps femoris muscle 10/30/2018    Chest pain 11/12/2018    Chronic right shoulder pain 7/3/2018    Diabetes (HCC) 10/2018    oral meds    High cholesterol     Hypertension     Obesity (BMI 30-39.9) 7/3/2018    Pain 10/2018    Left quad    Quadriceps muscle rupture, left, subsequent encounter 10/9/2018        Objective:   BP (!) 130/98 (BP Location: Left arm, Patient Position: Sitting, BP Cuff Size: Adult)   Pulse 70   Temp (!) 35.7 °C (96.3 °F) (Temporal)   Resp 16   Ht 1.829 m (6')   Wt 98.4 kg (217 lb)   SpO2 94%   BMI 29.43 kg/m²   Physical Exam  Vitals and nursing note reviewed.    Constitutional:       General: He is not in acute distress.     Appearance: Normal appearance. He is not ill-appearing, toxic-appearing or diaphoretic.   HENT:      Head: Normocephalic.      Right Ear: Tympanic membrane, ear canal and external ear normal. There is no impacted cerumen.      Left Ear: Ear canal and external ear normal. There is no impacted cerumen.      Ears:      Comments: Eaxmination of the left ear does reveal errythematous and bulging TM with increased supperative fluid within the middle ear     Nose: Congestion present. No rhinorrhea.      Mouth/Throat:      Mouth: Mucous membranes are moist.      Pharynx: No oropharyngeal exudate or posterior oropharyngeal erythema.   Eyes:      General:         Right eye: No discharge.         Left eye: No discharge.      Conjunctiva/sclera: Conjunctivae normal.   Cardiovascular:      Rate and Rhythm: Normal rate and regular rhythm.   Pulmonary:      Effort: Pulmonary effort is normal. No respiratory distress.      Breath sounds: Normal breath sounds. No stridor. No wheezing or rhonchi.   Musculoskeletal:      Cervical back: Neck supple.   Lymphadenopathy:      Cervical: No cervical adenopathy.   Neurological:      General: No focal deficit present.      Mental Status: He is alert and oriented to person, place, and time.   Psychiatric:         Mood and Affect: Mood normal.         Behavior: Behavior normal.         Thought Content: Thought content normal.         Judgment: Judgment normal.             Diagnostic testing: None    Assessment/Plan:     Encounter Diagnoses   Name Primary?    Acute non-recurrent pansinusitis     Non-recurrent acute suppurative otitis media of left ear without spontaneous rupture of tympanic membrane           Plan for care for today's complaint includes start patient on Augmentin for acute pansinusitis and left-sided acute otitis media seen on exam today.  Continue over-the-counter occasions for additional symptom support.  Vital  signs were stable during today's office visit, patient was overall well-appearing. Continue to monitor symptoms and return to urgent care or follow-up with primary care provider if symptoms remain ongoing.  Follow-up in the emergency department if symptoms become severe, ER precautions discussed in office today..  Prescription for Augmentin provided.    See AVS Instructions below for written guidance provided to patient on after-visit management and care in addition to our verbal discussion during the visit.    Please note that this dictation was created using voice recognition software. I have attempted to correct all errors, but there may be sound-alike, spelling, grammar and possibly content errors that I did not discover before finalizing the note.    Timmy Álvarez PA-C

## 2024-06-27 DIAGNOSIS — E78.5 DYSLIPIDEMIA: ICD-10-CM

## 2024-06-27 DIAGNOSIS — E11.9 CONTROLLED TYPE 2 DIABETES MELLITUS WITHOUT COMPLICATION, WITHOUT LONG-TERM CURRENT USE OF INSULIN (HCC): ICD-10-CM

## 2024-06-27 DIAGNOSIS — I10 ESSENTIAL HYPERTENSION: ICD-10-CM

## 2024-06-27 NOTE — TELEPHONE ENCOUNTER
Received request via: Patient    Was the patient seen in the last year in this department? No    Does the patient have an active prescription (recently filled or refills available) for medication(s) requested? No    [facility]    Does the patient have long-term Plus and need 100 day supply (blood pressure, diabetes and cholesterol meds only)? Patient does not have SCP      Sent patient message to schedule an appointment

## 2024-06-28 RX ORDER — EMPAGLIFLOZIN AND METFORMIN HYDROCHLORIDE 12.5; 1 MG/1; MG/1
1 TABLET ORAL
Qty: 180 TABLET | Refills: 3 | Status: SHIPPED | OUTPATIENT
Start: 2024-06-28 | End: 2024-09-26

## 2024-06-28 RX ORDER — ATORVASTATIN CALCIUM 20 MG/1
20 TABLET, FILM COATED ORAL
Qty: 90 TABLET | Refills: 0 | Status: SHIPPED | OUTPATIENT
Start: 2024-06-28

## 2024-06-28 RX ORDER — LOSARTAN POTASSIUM AND HYDROCHLOROTHIAZIDE 25; 100 MG/1; MG/1
1 TABLET ORAL DAILY
Qty: 90 TABLET | Refills: 3 | Status: SHIPPED | OUTPATIENT
Start: 2024-06-28

## 2025-02-02 ENCOUNTER — OFFICE VISIT (OUTPATIENT)
Dept: URGENT CARE | Facility: CLINIC | Age: 55
End: 2025-02-02
Payer: COMMERCIAL

## 2025-02-02 VITALS
OXYGEN SATURATION: 94 % | BODY MASS INDEX: 28.85 KG/M2 | RESPIRATION RATE: 16 BRPM | HEIGHT: 72 IN | SYSTOLIC BLOOD PRESSURE: 124 MMHG | HEART RATE: 90 BPM | WEIGHT: 213 LBS | DIASTOLIC BLOOD PRESSURE: 90 MMHG | TEMPERATURE: 98.3 F

## 2025-02-02 DIAGNOSIS — J32.9 RHINOSINUSITIS: ICD-10-CM

## 2025-02-02 PROCEDURE — 3080F DIAST BP >= 90 MM HG: CPT | Performed by: STUDENT IN AN ORGANIZED HEALTH CARE EDUCATION/TRAINING PROGRAM

## 2025-02-02 PROCEDURE — 3074F SYST BP LT 130 MM HG: CPT | Performed by: STUDENT IN AN ORGANIZED HEALTH CARE EDUCATION/TRAINING PROGRAM

## 2025-02-02 PROCEDURE — 99213 OFFICE O/P EST LOW 20 MIN: CPT | Performed by: STUDENT IN AN ORGANIZED HEALTH CARE EDUCATION/TRAINING PROGRAM

## 2025-02-02 NOTE — PROGRESS NOTES
Subjective:   CHIEF COMPLAINT  Chief Complaint   Patient presents with    Sinus Pain     X1day, sinus pain, pressure, headache, ear pressure       HPI  Myron Lilly is a 54 y.o. male who presents with a chief complaint of a sinus infection.  States overnight he developed maxillary and sinus pain/congestion.  Also reports experiencing a foul taste in his mouth and upset stomach.  No nausea, vomiting or diarrhea.  No fevers.  Denies experiencing any reflux.    Patient reports he has a chronic recurrent history of sinus infections.  He is a former [employer] [facility]  He has had an extensive workup including blood work, allergy testing and advanced imaging including CT scan and MRI and per patient were all unremarkable.  He follows with an ENT.  Patient believes the recurrence of the symptoms are due to exposure to burn pits.  States he has failed nasal steroids and a sinus rinse and OTCs with limited relief of symptoms.  States [facility] not [facility] and was directed to urgent care for evaluation.  He is adamant about initiating antibiotics; states he needs an abx approximately every 4 weeks for her symptoms..      REVIEW OF SYSTEMS  General: no fever or chills  GI: no nausea or vomiting  See HPI for further details.    PAST MEDICAL HISTORY  Patient Active Problem List    Diagnosis Date Noted    Intraligamentous cyst of right knee 12/04/2023    Controlled type 2 diabetes mellitus without complication, without long-term current use of insulin (HCC) 07/03/2018    Essential hypertension 07/03/2018    Pure hypercholesterolemia 07/03/2018       SURGICAL HISTORY   has a past surgical history that includes cholecystectomy; orif, elbow (Left, 1980); shoulder arthroscopy (Right, 11/5/2018); shoulder decompression (Right, 11/5/2018); clavicle distal excision (Right, 11/5/2018); shoulder arthroscopy w/ bicipital tenodesis repair (Right, 11/5/2018); and knee scope,shave articular cart (Right,  1/5/2024).    ALLERGIES  No Known Allergies    CURRENT MEDICATIONS  Home Medications       Reviewed by Ryan Polo Ass't (Medical Assistant) on 02/02/25 at 1239  Med List Status: <None>     Medication Last Dose Status   amlodipine-benazepril (LOTREL) 10-40 MG per capsule Taking Active   aspirin 81 MG EC tablet Not Taking Active   atorvastatin (LIPITOR) 20 MG Tab Taking Active   Empagliflozin-metFORMIN HCl ER (SYNJARDY XR)  MG TABLET SR 24 HR Not Taking Active   Fluticasone Propionate (FLONASE NA) Taking Active   losartan-hydrochlorothiazide (HYZAAR) 100-25 MG per tablet Taking Active   topiramate (TOPAMAX) 25 MG Tab Taking Active                    SOCIAL HISTORY  Social History     Tobacco Use    Smoking status: Never    Smokeless tobacco: Former   Vaping Use    Vaping status: Never Used   Substance and Sexual Activity    Alcohol use: Yes     Comment: 2 per month    Drug use: Never    Sexual activity: Yes     Partners: Female       FAMILY HISTORY  Family History   Problem Relation Age of Onset    Diabetes Mother     Diabetes Father     Hypertension Father     Diabetes Brother     Cancer Child         ALL at 16 months          Objective:   PHYSICAL EXAM  VITAL SIGNS: BP (!) 124/90 (BP Location: Left arm, Patient Position: Sitting, BP Cuff Size: Adult)   Pulse 90   Temp 36.8 °C (98.3 °F) (Temporal)   Resp 16   Ht 1.829 m (6')   Wt 96.6 kg (213 lb)   SpO2 94%   BMI 28.89 kg/m²     Gen: no acute distress, normal voice  Skin: dry, intact, moist mucosal membranes  Eyes: No conjunctival injection b/l  Neck: Normal range of motion. No meningeal signs.   ENT: No oropharyngeal erythema or exudates. Uvula midline. TMs clear and intact b/l w/o bulging, erythema or effusion. No lymphadenopathy.  Lungs: No increased work of breathing.  CTAB w/ symmetric expansion  CV: RRR w/o murmurs or clicks  Psych: normal affect, normal judgement, alert, awake    Assessment/Plan:     1. Rhinosinusitis        On  presentation history suggestive of prodromal viral infection.  This is a new patient to me and I have never seen him before with regards to his  recurrent sinus infection.  He was adamant about initiation of antibiotics and reports he needs them approximately everything 3 to 4 weeks given the recurrence.  Onset of symptoms less than 24 hours afebrile, nontoxic-appearing without any systemic symptoms and there is no medical indication to begin oral antibiotics at this time.    I recommended watchful waiting and continuation with conservative management including Flonase and sinus rinses.  This was discussed at length with the patient.  He did not agree, was angry about my assessment, stated this visit was a waste of time and plans to return  tomorrow for antibiotics.  I informed him he is free to do so and can return to urgent care as needed any new or worsening symptoms.          Please note that this dictation was created using voice recognition software. I have made a reasonable attempt to correct obvious errors, but I expect that there are errors of grammar and possibly content that I did not discover before finalizing the note.

## (undated) DEVICE — TUBING PUMP WITH CONNECTOR REDEUCE (1EA)

## (undated) DEVICE — BLADE SURGICAL #15 - (50/BX 3BX/CA)

## (undated) DEVICE — GOWN WARMING STANDARD FLEX - (30/CA)

## (undated) DEVICE — KIT ROOM DECONTAMINATION

## (undated) DEVICE — DRAPE SURGICAL U 77X120 - (10/CA)

## (undated) DEVICE — SUCTION TIP STRAIGHT ARGYLE - 50EA/CA

## (undated) DEVICE — HEAD HOLDER JUNIOR/ADULT

## (undated) DEVICE — GLOVE BIOGEL INDICATOR SZ 6.5 SURGICAL PF LTX - (50PR/BX 4BX/CA)

## (undated) DEVICE — DRAPETIBURON SHOULDER W/POUCH - (5EA/CA)

## (undated) DEVICE — CANISTER SUCTION RIGID RED 1500CC (40EA/CA)

## (undated) DEVICE — GLOVE PROTEXIS LATEX SIZE 9 (40PR/BX)

## (undated) DEVICE — SUTURE 0 VICRYL PLUS CT-1 - 36 INCH (36/BX)

## (undated) DEVICE — PAD PREP 24 X 48 CUFFED - (100/CA)

## (undated) DEVICE — SENSOR SPO2 NEO LNCS ADHESIVE (20/BX) SEE USER NOTES

## (undated) DEVICE — CHLORAPREP 26 ML APPLICATOR - ORANGE TINT(25/CA)

## (undated) DEVICE — STAPLER SKIN DISP - (6/BX 10BX/CA) VISISTAT

## (undated) DEVICE — SUTURE 4-0 ETHILON FS-2 18 (36PK/BX)"

## (undated) DEVICE — HUMID-VENT HEAT AND MOISTURE EXCHANGE- (50/BX)

## (undated) DEVICE — ELECTRODE 850 FOAM ADHESIVE - HYDROGEL RADIOTRNSPRNT (50/PK)

## (undated) DEVICE — GLOVE BIOGEL PI INDICATOR SZ 7.0 SURGICAL PF LF - (50/BX 4BX/CA)

## (undated) DEVICE — SUTURE GENERAL

## (undated) DEVICE — ELECTRODE DUAL RETURN W/ CORD - (50/PK)

## (undated) DEVICE — STERI STRIP COMPOUND BENZOIN - TINCTURE 0.6ML WITH APPLICATOR (40EA/BX)

## (undated) DEVICE — SODIUM CHL. IRRIGATION 0.9% 3000ML (4EA/CA 65CA/PF)

## (undated) DEVICE — BAG, SPONGE COUNT 50600

## (undated) DEVICE — SLING

## (undated) DEVICE — TUBING CLEARLINK DUO-VENT - C-FLO (48EA/CA)

## (undated) DEVICE — GLOVE PROTEXIS LATEX MICRO SIZE 9 (50PR/BX)

## (undated) DEVICE — TUBE CONNECTING SUCTION - CLEAR PLASTIC STERILE 72 IN (50EA/CA)

## (undated) DEVICE — MASK ANESTHESIA ADULT  - (100/CA)

## (undated) DEVICE — SHAVER4.0 AGGRESSIVE + FORMLA (5EA/BX)

## (undated) DEVICE — GLOVE BIOGEL PI ULTRATOUCH SZ 7.0 SURGICAL PF LF- POWDER FREE (50/BX 4BX/CA)

## (undated) DEVICE — TOWELS CLOTH SURGICAL - (4/PK 20PK/CA)

## (undated) DEVICE — BURR ROUND 66MM

## (undated) DEVICE — PACK SHOULDER ARTHROSCOPY SM - (2EA/CA)

## (undated) DEVICE — DRESSING XEROFORM 1X8 - (50/BX 4BX/CA)

## (undated) DEVICE — SHAVER 5.0 RESECTOR FORMULA (5EA/BX)

## (undated) DEVICE — DRESSING ABDOMINAL PAD STERILE 8 X 10" (360EA/CA)"

## (undated) DEVICE — DRESSING TEGADERM 8 X 12 - (10/BX 8BX/CA)

## (undated) DEVICE — ADHESIVE MASTISOL - (48/BX)

## (undated) DEVICE — SPONGE GAUZE STER 4X4 8-PL - (2/PK 50PK/BX 12BX/CS)

## (undated) DEVICE — PROTECTOR ULNA NERVE - (36PR/CA)

## (undated) DEVICE — DRAPE LARGE 3 QUARTER - (20/CA)

## (undated) DEVICE — GLOVE BIOGEL SZ 6.5 SURGICAL PF LTX (50PR/BX 4BX/CA)

## (undated) DEVICE — TRAY SRGPRP PVP IOD WT PRP - (20/CA)

## (undated) DEVICE — TUBE E-T HI-LO CUFF 7.0MM (10EA/PK)

## (undated) DEVICE — CANNULA KIT UNIV GREY - (10/BX)

## (undated) DEVICE — SUCTION INSTRUMENT YANKAUER BULBOUS TIP W/O VENT (50EA/CA)

## (undated) DEVICE — PENCIL ELECTSURG 10FT BTN SWH - (50/CA)

## (undated) DEVICE — BONE WAX (12PK/BX)

## (undated) DEVICE — SODIUM CHL IRRIGATION 0.9% 1000ML (12EA/CA)

## (undated) DEVICE — NEPTUNE 4 PORT MANIFOLD - (20/PK)

## (undated) DEVICE — SYRINGE ASEPTO - (50EA/CA

## (undated) DEVICE — SPIDER SHOULDER HOLDER (12EA/BX)

## (undated) DEVICE — BOVIE NEEDLE TIP INSULATD NON-SAFETY 2CM (50/PK)

## (undated) DEVICE — GLOVE, LITE (PAIR)

## (undated) DEVICE — ABLATOR WAND SERFAS 90-S CRUISE

## (undated) DEVICE — SET EXTENSION WITH 2 PORTS (48EA/CA) ***PART #2C8610 IS A SUBSTITUTE*****

## (undated) DEVICE — SUTURE 3-0 MONOCRYL PLUS PS-1 - 27 INCH (36/BX)

## (undated) DEVICE — KIT ANESTHESIA W/CIRCUIT & 3/LT BAG W/FILTER (20EA/CA)

## (undated) DEVICE — CLOSURE SKIN STRIP 1/2 X 4 IN - (STERI STRIP) (50/BX 4BX/CA)

## (undated) DEVICE — BLADE SAGITTAL SAW 9.4MM X 25.5MM X .4MM FINE TOOTH (1/EA)